# Patient Record
Sex: MALE | Race: WHITE | NOT HISPANIC OR LATINO | Employment: OTHER | URBAN - NONMETROPOLITAN AREA
[De-identification: names, ages, dates, MRNs, and addresses within clinical notes are randomized per-mention and may not be internally consistent; named-entity substitution may affect disease eponyms.]

---

## 2017-01-05 RX ORDER — TRAZODONE HYDROCHLORIDE 50 MG/1
TABLET ORAL
Qty: 30 TABLET | Refills: 5 | Status: SHIPPED | OUTPATIENT
Start: 2017-01-05 | End: 2017-06-09 | Stop reason: SDUPTHER

## 2017-01-16 ENCOUNTER — TELEPHONE (OUTPATIENT)
Dept: INTERNAL MEDICINE | Facility: CLINIC | Age: 77
End: 2017-01-16

## 2017-01-16 DIAGNOSIS — M79.672 FOOT PAIN, LEFT: Primary | ICD-10-CM

## 2017-01-23 ENCOUNTER — LAB (OUTPATIENT)
Dept: INTERNAL MEDICINE | Facility: CLINIC | Age: 77
End: 2017-01-23

## 2017-01-23 ENCOUNTER — HOSPITAL ENCOUNTER (OUTPATIENT)
Dept: GENERAL RADIOLOGY | Facility: HOSPITAL | Age: 77
Discharge: HOME | End: 2017-01-23
Attending: INTERNAL MEDICINE | Admitting: INTERNAL MEDICINE

## 2017-01-23 DIAGNOSIS — E78.5 HYPERLIPIDEMIA, UNSPECIFIED HYPERLIPIDEMIA TYPE: Chronic | ICD-10-CM

## 2017-01-23 DIAGNOSIS — D64.89 ANEMIA DUE TO OTHER CAUSE, NOT CLASSIFIED: ICD-10-CM

## 2017-01-23 DIAGNOSIS — M79.672 FOOT PAIN, LEFT: ICD-10-CM

## 2017-01-23 DIAGNOSIS — E11.8 TYPE 2 DIABETES MELLITUS WITH COMPLICATION, WITHOUT LONG-TERM CURRENT USE OF INSULIN (HCC): Chronic | ICD-10-CM

## 2017-01-23 DIAGNOSIS — E55.9 VITAMIN D DEFICIENCY: ICD-10-CM

## 2017-01-23 LAB
25(OH)D3 SERPL-MCNC: 37.4 NG/ML
ALBUMIN SERPL-MCNC: 4.2 G/DL (ref 3.2–4.8)
ALBUMIN/GLOB SERPL: 1.7 G/DL (ref 1.5–2.5)
ALP SERPL-CCNC: 54 U/L (ref 25–100)
ALT SERPL W P-5'-P-CCNC: 19 U/L (ref 7–40)
ANION GAP SERPL CALCULATED.3IONS-SCNC: 13 MMOL/L (ref 3–11)
ARTICHOKE IGE QN: 60 MG/DL (ref 0–130)
AST SERPL-CCNC: 23 U/L (ref 0–33)
BASOPHILS # BLD AUTO: 0.03 10*3/MM3 (ref 0–0.2)
BASOPHILS NFR BLD AUTO: 0.4 % (ref 0–1)
BILIRUB SERPL-MCNC: 0.6 MG/DL (ref 0.3–1.2)
BUN BLD-MCNC: 13 MG/DL (ref 9–23)
BUN/CREAT SERPL: 10 (ref 7–25)
CALCIUM SPEC-SCNC: 9.8 MG/DL (ref 8.7–10.4)
CHLORIDE SERPL-SCNC: 100 MMOL/L (ref 99–109)
CHOLEST SERPL-MCNC: 132 MG/DL (ref 0–200)
CK SERPL-CCNC: 210 U/L (ref 26–174)
CO2 SERPL-SCNC: 29 MMOL/L (ref 20–31)
CREAT BLD-MCNC: 1.3 MG/DL (ref 0.6–1.3)
DEPRECATED RDW RBC AUTO: 44.6 FL (ref 37–54)
EOSINOPHIL # BLD AUTO: 0.34 10*3/MM3 (ref 0.1–0.3)
EOSINOPHIL NFR BLD AUTO: 4.9 % (ref 0–3)
ERYTHROCYTE [DISTWIDTH] IN BLOOD BY AUTOMATED COUNT: 13.2 % (ref 11.3–14.5)
GFR SERPL CREATININE-BSD FRML MDRD: 54 ML/MIN/1.73
GLOBULIN UR ELPH-MCNC: 2.5 GM/DL
GLUCOSE BLD-MCNC: 84 MG/DL (ref 70–100)
HBA1C MFR BLD: 7.4 % (ref 4.8–5.6)
HCT VFR BLD AUTO: 43.5 % (ref 38.9–50.9)
HDLC SERPL-MCNC: 47 MG/DL (ref 40–60)
HGB BLD-MCNC: 14.5 G/DL (ref 13.1–17.5)
IMM GRANULOCYTES # BLD: 0.01 10*3/MM3 (ref 0–0.03)
IMM GRANULOCYTES NFR BLD: 0.1 % (ref 0–0.6)
LYMPHOCYTES # BLD AUTO: 2.29 10*3/MM3 (ref 0.6–4.8)
LYMPHOCYTES NFR BLD AUTO: 33.3 % (ref 24–44)
MCH RBC QN AUTO: 30.7 PG (ref 27–31)
MCHC RBC AUTO-ENTMCNC: 33.3 G/DL (ref 32–36)
MCV RBC AUTO: 92.2 FL (ref 80–99)
MONOCYTES # BLD AUTO: 0.54 10*3/MM3 (ref 0–1)
MONOCYTES NFR BLD AUTO: 7.9 % (ref 0–12)
NEUTROPHILS # BLD AUTO: 3.66 10*3/MM3 (ref 1.5–8.3)
NEUTROPHILS NFR BLD AUTO: 53.4 % (ref 41–71)
PLATELET # BLD AUTO: 133 10*3/MM3 (ref 150–450)
PMV BLD AUTO: 10 FL (ref 6–12)
POTASSIUM BLD-SCNC: 4.7 MMOL/L (ref 3.5–5.5)
PROT SERPL-MCNC: 6.7 G/DL (ref 5.7–8.2)
RBC # BLD AUTO: 4.72 10*6/MM3 (ref 4.2–5.76)
SODIUM BLD-SCNC: 142 MMOL/L (ref 132–146)
TRIGL SERPL-MCNC: 87 MG/DL (ref 0–150)
WBC NRBC COR # BLD: 6.87 10*3/MM3 (ref 3.5–10.8)

## 2017-01-23 PROCEDURE — 85025 COMPLETE CBC W/AUTO DIFF WBC: CPT | Performed by: INTERNAL MEDICINE

## 2017-01-23 PROCEDURE — 83036 HEMOGLOBIN GLYCOSYLATED A1C: CPT | Performed by: INTERNAL MEDICINE

## 2017-01-23 PROCEDURE — 82306 VITAMIN D 25 HYDROXY: CPT | Performed by: INTERNAL MEDICINE

## 2017-01-23 PROCEDURE — 80053 COMPREHEN METABOLIC PANEL: CPT | Performed by: INTERNAL MEDICINE

## 2017-01-23 PROCEDURE — 80061 LIPID PANEL: CPT | Performed by: INTERNAL MEDICINE

## 2017-01-23 PROCEDURE — 82550 ASSAY OF CK (CPK): CPT | Performed by: INTERNAL MEDICINE

## 2017-01-23 PROCEDURE — 73630 X-RAY EXAM OF FOOT: CPT

## 2017-01-25 ENCOUNTER — OFFICE VISIT (OUTPATIENT)
Dept: INTERNAL MEDICINE | Facility: CLINIC | Age: 77
End: 2017-01-25

## 2017-01-25 VITALS
RESPIRATION RATE: 14 BRPM | SYSTOLIC BLOOD PRESSURE: 120 MMHG | OXYGEN SATURATION: 96 % | WEIGHT: 208 LBS | HEIGHT: 73 IN | HEART RATE: 66 BPM | TEMPERATURE: 97.7 F | BODY MASS INDEX: 27.57 KG/M2 | DIASTOLIC BLOOD PRESSURE: 66 MMHG

## 2017-01-25 DIAGNOSIS — I10 ESSENTIAL HYPERTENSION: Chronic | ICD-10-CM

## 2017-01-25 DIAGNOSIS — E78.5 HYPERLIPIDEMIA, UNSPECIFIED HYPERLIPIDEMIA TYPE: Chronic | ICD-10-CM

## 2017-01-25 DIAGNOSIS — D64.89 ANEMIA DUE TO OTHER CAUSE, NOT CLASSIFIED: ICD-10-CM

## 2017-01-25 DIAGNOSIS — M54.16 LUMBAR RADICULOPATHY: ICD-10-CM

## 2017-01-25 DIAGNOSIS — K21.9 GASTROESOPHAGEAL REFLUX DISEASE WITHOUT ESOPHAGITIS: Chronic | ICD-10-CM

## 2017-01-25 DIAGNOSIS — M77.32 HEEL SPUR, LEFT: Primary | ICD-10-CM

## 2017-01-25 DIAGNOSIS — C61 MALIGNANT NEOPLASM OF PROSTATE (HCC): ICD-10-CM

## 2017-01-25 DIAGNOSIS — I25.119 CORONARY ARTERY DISEASE INVOLVING NATIVE CORONARY ARTERY OF NATIVE HEART WITH ANGINA PECTORIS (HCC): Chronic | ICD-10-CM

## 2017-01-25 DIAGNOSIS — E11.8 TYPE 2 DIABETES MELLITUS WITH COMPLICATION, WITHOUT LONG-TERM CURRENT USE OF INSULIN (HCC): Chronic | ICD-10-CM

## 2017-01-25 DIAGNOSIS — E55.9 VITAMIN D DEFICIENCY: ICD-10-CM

## 2017-01-25 PROCEDURE — 99214 OFFICE O/P EST MOD 30 MIN: CPT | Performed by: INTERNAL MEDICINE

## 2017-01-25 NOTE — MR AVS SNAPSHOT
Tristan Haines   1/25/2017 1:45 PM   Office Visit    Provider:  Cayden Chiang MD   Department:  [facility] PRIMARY CARE   Dept Phone:  [phone]                Your Full Care Plan              Your Updated Medication List          This list is accurate as of: 1/25/17  3:34 PM.  Always use your most recent med list.                aspirin 81 MG tablet       atorvastatin 40 MG tablet   Commonly known as:  LIPITOR   TAKE 1 TABLET BY MOUTH ONE TIME A DAY       BD PEN NEEDLE EDDI U/F 32G X 4 MM misc   Generic drug:  Insulin Pen Needle   USE TO INJECT INSULIN TWICE DAILY AS DIRECTED       carvedilol 25 MG tablet   Commonly known as:  COREG   TAKE 1 TABLET BY MOUTH TWO TIMES A DAY as directed by physician       gabapentin 400 MG capsule   Commonly known as:  NEURONTIN   TAKE 1 CAPSULE BY MOUTH THREE TIMES A DAY       insulin aspart 100 UNIT/ML solution pen-injector sc pen   Commonly known as:  NOVOLOG FLEXPEN   Inject 5 Units under the skin 2 (two) times a day before meals.       lansoprazole 30 MG capsule   Commonly known as:  PREVACID   Take 1 capsule by mouth daily.       losartan 100 MG tablet   Commonly known as:  COZAAR   1 tab daily po       * ONETOUCH ULTRA BLUE VI       * ONE TOUCH ULTRA TEST test strip   Generic drug:  glucose blood   TEST BLOOS SUGAR 3 TIMES DAILY       PROBIOTIC DAILY PO       TOUJEO SOLOSTAR 300 UNIT/ML solution pen-injector   Generic drug:  Insulin Glargine   inject 25 UNITS sub-q TWO TIMES A DAY       traZODone 50 MG tablet   Commonly known as:  DESYREL   TAKE 1 TABLET BY MOUTH every night       Vitamin D3 2000 UNITS tablet       * Notice:  This list has 2 medication(s) that are the same as other medications prescribed for you. Read the directions carefully, and ask your doctor or other care provider to review them with you.            We Performed the Following     Ambulatory Referral to Podiatry       You Were Diagnosed With        Codes Comments       Heel spur, left    -  Primary ICD-10-CM: M77.32  ICD-9-CM: 726.73     Hyperlipidemia, unspecified hyperlipidemia type     ICD-10-CM: E78.5  ICD-9-CM: 272.4     Essential hypertension     ICD-10-CM: I10  ICD-9-CM: 401.9     Coronary artery disease involving native coronary artery of native heart with angina pectoris     ICD-10-CM: I25.119  ICD-9-CM: 414.01, 413.9     Gastroesophageal reflux disease without esophagitis     ICD-10-CM: K21.9  ICD-9-CM: 530.81     Type 2 diabetes mellitus with complication, without long-term current use of insulin     ICD-10-CM: E11.8  ICD-9-CM: 250.90     Vitamin D deficiency     ICD-10-CM: E55.9  ICD-9-CM: 268.9     Lumbar radiculopathy     ICD-10-CM: M54.16  ICD-9-CM: 724.4     Anemia due to other cause, not classified     ICD-10-CM: D64.89  ICD-9-CM: 285.8     Malignant neoplasm of prostate     ICD-10-CM: C61  ICD-9-CM: 185       Instructions     None    Patient Instructions History      [employer] Signup     [employer] allows you to send messages to your doctor, view your test results, renew your prescriptions, schedule appointments, and more. To sign up, go to [employer] and click on the Sign Up Now link in the New User? box. Enter your [employer] Activation Code exactly as it appears below along with the last four digits of your Social Security Number and your Date of Birth () to complete the sign-up process. If you do not sign up before the expiration date, you must request a new code.    [employer] Activation Code: [email or web address]  Expires: 2017 10:14 AM    If you have questions, you can email [email or web address] or call [phone] to talk to our [employer] staff. Remember, V[employer]able is NOT to be used for urgent needs. For medical emergencies, dial 911.               Other Info from Your Visit           Your Appointments     Mar 07, 2017  9:30 AM EST   Outside Facility with Darrian Kilpatrick MD   [facility] GASTROENTEROLOGY  "(--)    [address]  [facility] [address] [phone]              Allergies     No Known Allergies      Reason for Visit     Diabetes Follow up - discuss labs and x-ray, MRI      Vital Signs     Blood Pressure Pulse Temperature Respirations Height Weight    120/66 66 97.7 °F (36.5 °C) 14 73\" (185.4 cm) 208 lb (94.3 kg)    Oxygen Saturation Body Mass Index Smoking Status             96% 27.44 kg/m2 Never Smoker         Problems and Diagnoses Noted     Anemia    Coronary heart disease    Diabetes    Acid reflux disease    High cholesterol or triglycerides    High blood pressure    Lumbar nerve root disorder    Prostate cancer    Vitamin D deficiency    Heel spur    -  Primary      "

## 2017-01-25 NOTE — PROGRESS NOTES
Subjective   Tristan Haines is a 77 y.o. male.     Chief Complaint   Patient presents with   • Diabetes     Follow up - discuss labs and x-ray, MRI       History of Present Illness   Patient here for follow-up. A1c 7.4. Creatinine kinase 196 vitamin D 37, . Coronary artery disease is stable medication. Hyperlipidemia stable medication. Neuropathy stable medication., self discontinued novolog, HTN stable on med, [place] ankle swelling,     Current Outpatient Prescriptions:   •  aspirin 81 MG tablet, Take  by mouth., Disp: , Rfl:   •  atorvastatin (LIPITOR) 40 MG tablet, TAKE 1 TABLET BY MOUTH ONE TIME A DAY , Disp: 30 tablet, Rfl: 1  •  BD PEN NEEDLE EDDI U/F 32G X 4 MM misc, USE TO INJECT INSULIN TWICE DAILY AS DIRECTED , Disp: 100 each, Rfl: 2  •  carvedilol (COREG) 25 MG tablet, TAKE 1 TABLET BY MOUTH TWO TIMES A DAY as directed by physician , Disp: 180 tablet, Rfl: 1  •  Cholecalciferol (VITAMIN D3) 2000 UNITS tablet, Take  by mouth., Disp: , Rfl:   •  gabapentin (NEURONTIN) 400 MG capsule, TAKE 1 CAPSULE BY MOUTH THREE TIMES A DAY , Disp: 270 capsule, Rfl: 2  •  Glucose Blood (ONETOUCH ULTRA BLUE VI), OneTouch Ultra Blue In Vitro Strip; Patient Sig: OneTouch Ultra Blue In Vitro Strip TEST BLOOS SUGAR 3 TIMES DAILY; 100; 4; 09-Jan-2015; Active, Disp: , Rfl:   •  insulin aspart (NOVOLOG FLEXPEN) 100 UNIT/ML solution pen-injector sc pen, Inject 5 Units under the skin 2 (two) times a day before meals., Disp: 3 pen, Rfl: 3  •  lansoprazole (PREVACID) 30 MG capsule, Take 1 capsule by mouth daily., Disp: 90 capsule, Rfl: 3  •  losartan (COZAAR) 100 MG tablet, 1 tab daily po, Disp: 30 tablet, Rfl: 1  •  ONE TOUCH ULTRA TEST test strip, TEST BLOOS SUGAR 3 TIMES DAILY, Disp: 100 each, Rfl: 4  •  Probiotic Product (PROBIOTIC DAILY PO), Take  by mouth., Disp: , Rfl:   •  TOUJEO SOLOSTAR 300 UNIT/ML solution pen-injector, inject 25 UNITS sub-q TWO TIMES A DAY , Disp: 4.5 mL, Rfl: 4  •  traZODone (DESYREL) 50 MG tablet, TAKE 1  TABLET BY MOUTH every night , Disp: 30 tablet, Rfl: 5    The following portions of the patient's history were reviewed and updated as appropriate: allergies, current medications, past family history, past medical history, past social history, past surgical history and problem list.    Review of Systems   Constitutional: Negative.    Respiratory: Negative.    Cardiovascular: Negative.    Gastrointestinal: Negative.    Musculoskeletal: Negative.    Skin: Negative.    Neurological: Negative.    Psychiatric/Behavioral: Negative.        Objective   Physical Exam   Constitutional: He is oriented to person, place, and time. He appears well-nourished.   Neck: Neck supple.   Cardiovascular: Normal rate, regular rhythm and normal heart sounds.    Pulmonary/Chest: Effort normal and breath sounds normal.   Abdominal: Bowel sounds are normal.   Neurological: He is alert and oriented to person, place, and time.   Skin: Skin is warm.   Psychiatric: He has a normal mood and affect.       All tests have been reviewed.    Assessment/Plan   There are no diagnoses linked to this encounter.          lumbar disc surgery, 2004 L4-5.  LBP lumbar radiculopathy. continue Flexeril Mobic, MRI L2/3 mod to severe stenosis. continue follow-up with chiropractor, pain improved   neuropathy,neurotin 400mg bid continue unable to tolerate lyrica  sacroiliac inflammation continue chiro. tylenol arthritis.  edema 1+ pressure hose.hold amlodipine   Coronary artery disease a status post a CABG pleural effusion follow up chest x-ray continue cardio follow up  Anemia, resolved. Bright red blood per rectum Hemoccult positive m. w/u lab done low ferritin sbft if still anemia. Patient later declined to do colonoscopy and upper endoscopy  eczema refill medicine  Diabetes continue toujeo 50u qhs, resume mealtime novolog 5 units tid.   colon 2012? obtain record. [facility] dr dannie garcia--  diarrhea, metformin caused, resolved after d/c metformin  GERD  continue medicine pending EGD  Hypertension continue medicine,  losartan 100mg  Hyperlipidemia cotninue atorvastatin 40 mg   Peripheral neuropathy continue medications  tinnitus, loss of hearing  vision change see eye doctor  leg cramps skin lesions,   hydrocele chronic patient decline to see uro   sleep disturbances, continue trazodone  Td to 2010?, prevnar 13 done, zostavax done, refuse flushot. pneumovax done--  prostate ca s/p removal PSA=0.00  Follow-up in 4 month after labs

## 2017-02-16 ENCOUNTER — OFFICE VISIT (OUTPATIENT)
Dept: ORTHOPEDIC SURGERY | Facility: CLINIC | Age: 77
End: 2017-02-16

## 2017-02-16 VITALS — HEIGHT: 73 IN | WEIGHT: 213 LBS | BODY MASS INDEX: 28.23 KG/M2 | RESPIRATION RATE: 18 BRPM

## 2017-02-16 DIAGNOSIS — M79.672 LEFT FOOT PAIN: Primary | ICD-10-CM

## 2017-02-16 DIAGNOSIS — E11.8 TYPE 2 DIABETES MELLITUS WITH COMPLICATION, WITHOUT LONG-TERM CURRENT USE OF INSULIN (HCC): ICD-10-CM

## 2017-02-16 DIAGNOSIS — M72.2 PLANTAR FASCIITIS: ICD-10-CM

## 2017-02-16 PROCEDURE — 99204 OFFICE O/P NEW MOD 45 MIN: CPT | Performed by: PODIATRIST

## 2017-02-16 RX ORDER — ATORVASTATIN CALCIUM 40 MG/1
TABLET, FILM COATED ORAL
Qty: 30 TABLET | Refills: 0 | Status: SHIPPED | OUTPATIENT
Start: 2017-02-16 | End: 2017-03-13 | Stop reason: SDUPTHER

## 2017-02-16 RX ORDER — LOSARTAN POTASSIUM 100 MG/1
TABLET ORAL
Qty: 30 TABLET | Refills: 0 | Status: SHIPPED | OUTPATIENT
Start: 2017-02-16 | End: 2017-03-13 | Stop reason: SDUPTHER

## 2017-02-16 NOTE — PROGRESS NOTES
Subjective   Patient ID: Tristan Haines is a 77 y.o. male   Pain and Consult of the Left Foot (Patient is being seen today in regards to Dr. Chiang.)  he comes in with multiple areas of pain and complaints.  He says he thinks he has plantar fasciitis and this knot on the outside of his foot bothers him.  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    History of Present Illness    Review of Systems   Constitutional: Negative for diaphoresis, fever and unexpected weight change.   HENT: Negative for dental problem and sore throat.    Eyes: Negative for visual disturbance.   Respiratory: Negative for shortness of breath.    Cardiovascular: Negative for chest pain.   Gastrointestinal: Negative for abdominal pain, constipation, diarrhea, nausea and vomiting.   Genitourinary: Negative for difficulty urinating and frequency.   Musculoskeletal: Positive for arthralgias.   Neurological: Negative for headaches.   Hematological: Does not bruise/bleed easily.       Past Medical History   Diagnosis Date   • Abdominal pain      Hx of.   • Atypical chest pain      Hx of.   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 He appears well-developed and well-nourished.   HENT:   Head: Normocephalic.   Eyes: Pupils are equal, round, and reactive to light.   Neurological: He is alert and oriented  to person, place, and time.   Skin: Skin is warm.   Psychiatric: He has a normal mood and affect. His behavior is normal.   Vitals reviewed.    Right Ankle Exam     Range of Motion   The patient has normal right ankle ROM.    Muscle Strength   The patient has normal right ankle strength.      Left Ankle Exam     Range of Motion   The patient has normal left ankle ROM.     Muscle Strength   The patient has normal left ankle strength.    Other   Sensation: normal  Pulse: present    Comments:  Patient is ttp at the plantar medial aspect of the calcaneal tuberosity.          Left Ankle Exam     Range of Motion   Normal left ankle ROM    Muscle Strength   Normal left ankle strength    Comments:  Patient is ttp at the plantar medial aspect of the calcaneal tuberosity.    Right Ankle Exam     Range of Motion   Normal right ankle ROM    Muscle Strength   Normal right ankle strength        Left Lower extremity exam:  Vascular: Pedal pulses are palpable, pedal hair is noted, capillary refill time is brisk  Neuro: Gross sensations intact but there is lack of protective sensation  Derm: His skin is supple and warm.  No dry skin scaling her wounds  MSK: He maintains his medial longitudinal arch upon weightbearing.  There is slight dorsal digital contracture digits 2 through 5.  He has some tenderness to palpation along the prominent fifth metatarsal base.  There is no pain to the medial tubercle of the calcaneus but some slight pain along the lateral band of the plantar fascia.    Assessment/Plan  diabetes, cavovarus foot, plantar fasciitis  Independent Review of Radiographic Studies:      Laboratory and Other Studies:     Medical Decision Making:        Procedures  [] No procedures were performed in office today.    Tristan was seen today for pain and consult.    Diagnoses and all orders for this visit:    Left foot pain  -     XR Foot 3+ View Left  -     Ambulatory Referral to Podiatry    Type 2 diabetes mellitus with  complication, without long-term current use of insulin  -     Ambulatory Referral to Podiatry    Plantar fasciitis  -     Ambulatory Referral to Podiatry    Other orders  -     diclofenac (VOLTAREN) 1 % gel gel; Apply 4 g topically 4 (Four) Times a Day As Needed (Pain).          Recommendations/Plan:  I had a long discussion with he and his wife about all the potential little subtle issues that are most likely causing him discomfort and pain.  I advised him that the neuropathy complaint part and causing numbness and tingling and burning pains.  I explained the pain in his heel is most likely his plantar fashion rather than the bone spur.  I explained that the prominence on the lateral part of his foot is his fifth metatarsal base and some time this can become irritated due to rubbing or pressure.  I explained he can also get a bursitis there are times.  For now i  think he would probably benefit from some more supportive shoes.  He is a  and stands a lot and says he typically wears flat shoes or dress shoes.  I recommend a more supportive tennis shoe.  I want to send him to Dr. Carrera to get diabetic shoes and inserts made.  I'll also try topical Voltaren.  He can't take oral NSAIDs due to GI issues.  I explained that if the Voltaren doesn't well we could maybe try a compounded topical cream.  Explained Dr. Chiang we'll have to fill out his M medical necessity for his shoes but then he should be to get those.  I'll see him back in several weeks after he gets them or he can come back sooner if needed.    Return in about 6 weeks (around 3/30/2017).  Patient agreeable to call or return sooner for any concerns.

## 2017-02-22 NOTE — TELEPHONE ENCOUNTER
----- Message from Cayden Chiang MD sent at 2/20/2017 11:02 AM EST -----  Contact: PATIENT  Please check how he could get the shoes, i have not talked with dr pimentel  ----- Message -----     From: Luz Eddy MA     Sent: 2/20/2017  10:03 AM       To: Cayden Chiang MD    ?  ----- Message -----     From: Pearl Benjamin     Sent: 2/17/2017  12:10 PM       To: Luz Eddy MA    Patient called today stating that he saw Dr. Pimentel and he told him that Dr. Chiang would need to write the prescriptions for his diabetic shoes for 1 year, and insoles 3 times a year. Patient was unsure if Dr. Pimentel would be sending a request or not, and he told him to speak with Dr. Chiang about this. Was checking to see if a request had been made. Contact patient with update, when possible. Thank you.

## 2017-03-07 ENCOUNTER — LAB REQUISITION (OUTPATIENT)
Dept: LAB | Facility: HOSPITAL | Age: 77
End: 2017-03-07

## 2017-03-07 ENCOUNTER — OUTSIDE FACILITY SERVICE (OUTPATIENT)
Dept: GASTROENTEROLOGY | Facility: CLINIC | Age: 77
End: 2017-03-07

## 2017-03-07 DIAGNOSIS — K21.00 GASTRO-ESOPHAGEAL REFLUX DISEASE WITH ESOPHAGITIS: ICD-10-CM

## 2017-03-07 PROCEDURE — 88305 TISSUE EXAM BY PATHOLOGIST: CPT | Performed by: INTERNAL MEDICINE

## 2017-03-07 PROCEDURE — 43239 EGD BIOPSY SINGLE/MULTIPLE: CPT | Performed by: INTERNAL MEDICINE

## 2017-03-08 LAB
CYTO UR: NORMAL
LAB AP CASE REPORT: NORMAL
LAB AP CLINICAL INFORMATION: NORMAL
Lab: NORMAL
PATH REPORT.FINAL DX SPEC: NORMAL
PATH REPORT.GROSS SPEC: NORMAL

## 2017-03-13 ENCOUNTER — TELEPHONE (OUTPATIENT)
Dept: GASTROENTEROLOGY | Facility: CLINIC | Age: 77
End: 2017-03-13

## 2017-03-13 RX ORDER — ATORVASTATIN CALCIUM 40 MG/1
TABLET, FILM COATED ORAL
Qty: 30 TABLET | Refills: 0 | Status: SHIPPED | OUTPATIENT
Start: 2017-03-13 | End: 2017-04-07 | Stop reason: SDUPTHER

## 2017-03-13 RX ORDER — LOSARTAN POTASSIUM 100 MG/1
TABLET ORAL
Qty: 30 TABLET | Refills: 0 | Status: SHIPPED | OUTPATIENT
Start: 2017-03-13 | End: 2017-03-20 | Stop reason: SDUPTHER

## 2017-03-13 NOTE — TELEPHONE ENCOUNTER
----- Message from Darrian Kilpatrick MD sent at 3/13/2017  3:02 PM EDT -----  Let Mr. Haines know there were some reflux changes. He should continue current medication.  Thanks,  GMW

## 2017-03-14 ENCOUNTER — TELEPHONE (OUTPATIENT)
Dept: GASTROENTEROLOGY | Facility: CLINIC | Age: 77
End: 2017-03-14

## 2017-03-17 DIAGNOSIS — Z12.11 COLON CANCER SCREENING: Primary | ICD-10-CM

## 2017-03-20 RX ORDER — INSULIN GLARGINE 300 U/ML
INJECTION, SOLUTION SUBCUTANEOUS
Qty: 4.5 ML | Refills: 3 | Status: SHIPPED | OUTPATIENT
Start: 2017-03-20 | End: 2017-07-28 | Stop reason: SDUPTHER

## 2017-03-20 RX ORDER — LOSARTAN POTASSIUM 100 MG/1
TABLET ORAL
Qty: 30 TABLET | Refills: 5 | Status: SHIPPED | OUTPATIENT
Start: 2017-03-20 | End: 2017-05-01 | Stop reason: SDUPTHER

## 2017-03-24 RX ORDER — CARVEDILOL 25 MG/1
TABLET ORAL
Qty: 180 TABLET | Refills: 0 | Status: SHIPPED | OUTPATIENT
Start: 2017-03-24 | End: 2017-06-17 | Stop reason: SDUPTHER

## 2017-03-30 ENCOUNTER — HOSPITAL ENCOUNTER (OUTPATIENT)
Dept: CT IMAGING | Facility: HOSPITAL | Age: 77
Discharge: HOME | End: 2017-03-30
Attending: INTERNAL MEDICINE | Admitting: INTERNAL MEDICINE

## 2017-03-30 ENCOUNTER — OFFICE VISIT (OUTPATIENT)
Dept: INTERNAL MEDICINE | Facility: CLINIC | Age: 77
End: 2017-03-30

## 2017-03-30 VITALS
RESPIRATION RATE: 14 BRPM | WEIGHT: 204 LBS | SYSTOLIC BLOOD PRESSURE: 132 MMHG | TEMPERATURE: 98.7 F | DIASTOLIC BLOOD PRESSURE: 80 MMHG | OXYGEN SATURATION: 96 % | HEIGHT: 73 IN | BODY MASS INDEX: 27.04 KG/M2 | HEART RATE: 72 BPM

## 2017-03-30 DIAGNOSIS — K21.9 GASTROESOPHAGEAL REFLUX DISEASE WITHOUT ESOPHAGITIS: Chronic | ICD-10-CM

## 2017-03-30 DIAGNOSIS — R10.9 ABDOMINAL PAIN, UNSPECIFIED LOCATION: ICD-10-CM

## 2017-03-30 DIAGNOSIS — E78.5 HYPERLIPIDEMIA, UNSPECIFIED HYPERLIPIDEMIA TYPE: Primary | Chronic | ICD-10-CM

## 2017-03-30 DIAGNOSIS — D64.89 ANEMIA DUE TO OTHER CAUSE, NOT CLASSIFIED: ICD-10-CM

## 2017-03-30 DIAGNOSIS — I25.119 CORONARY ARTERY DISEASE INVOLVING NATIVE CORONARY ARTERY OF NATIVE HEART WITH ANGINA PECTORIS (HCC): Chronic | ICD-10-CM

## 2017-03-30 DIAGNOSIS — E11.8 TYPE 2 DIABETES MELLITUS WITH COMPLICATION, WITHOUT LONG-TERM CURRENT USE OF INSULIN (HCC): Chronic | ICD-10-CM

## 2017-03-30 DIAGNOSIS — I10 ESSENTIAL HYPERTENSION: Chronic | ICD-10-CM

## 2017-03-30 PROCEDURE — 99214 OFFICE O/P EST MOD 30 MIN: CPT | Performed by: INTERNAL MEDICINE

## 2017-03-30 PROCEDURE — 74176 CT ABD & PELVIS W/O CONTRAST: CPT

## 2017-03-30 RX ORDER — LOSARTAN POTASSIUM 50 MG/1
TABLET ORAL
Refills: 2 | COMMUNITY
Start: 2017-02-24 | End: 2017-05-01 | Stop reason: SDUPTHER

## 2017-03-30 NOTE — PROGRESS NOTES
Subjective   Tristan Haines is a 77 y.o. male.     Chief Complaint   Patient presents with   • Abdominal Pain     ongoing  -  discuss labs from 1-23-17   • Fatigue     dont feel like doing anything       Abdominal Pain   This is a new problem. The current episode started 1 to 4 weeks ago. The onset quality is undetermined. The problem occurs intermittently. The problem has been unchanged. The pain is located in the LUQ and epigastric region. The pain is moderate. The quality of the pain is dull and sharp. The abdominal pain does not radiate. Associated symptoms include anorexia and constipation. Pertinent negatives include no diarrhea, dysuria, frequency, hematochezia, hematuria, nausea or vomiting. Associated symptoms comments: Stool caliber narrow. Nothing aggravates the pain. The pain is relieved by nothing. He has tried proton pump inhibitors (mom and pepto bismal helped some) for the symptoms. The treatment provided no relief.    Patient here also for follow-up.  The diabetes is stable medication.  Morning sugar between 100-110.  Denies any diarrhea.  GERD stable on medication patient does have mild epigastric area pain.  Hypertension stable medication.  Hyperlipidemia stable medication.  Sleep disturbance is stable medication.      Current Outpatient Prescriptions:   •  aspirin 81 MG tablet, Take  by mouth., Disp: , Rfl:   •  atorvastatin (LIPITOR) 40 MG tablet, TAKE 1 TABLET BY MOUTH ONE TIME A DAY , Disp: 30 tablet, Rfl: 0  •  BD PEN NEEDLE EDDI U/F 32G X 4 MM misc, USE TO INJECT INSULIN TWICE DAILY AS DIRECTED , Disp: 100 each, Rfl: 2  •  carvedilol (COREG) 25 MG tablet, TAKE 1 TABLET BY MOUTH TWO TIMES A DAY AS DIRECTED, Disp: 180 tablet, Rfl: 0  •  Cholecalciferol (VITAMIN D3) 2000 UNITS tablet, Take  by mouth., Disp: , Rfl:   •  diclofenac (VOLTAREN) 1 % gel gel, Apply 4 g topically 4 (Four) Times a Day As Needed (Pain)., Disp: 1 tube, Rfl: 3  •  gabapentin (NEURONTIN) 400 MG capsule, TAKE 1 CAPSULE BY  MOUTH THREE TIMES A DAY , Disp: 270 capsule, Rfl: 2  •  Glucose Blood (ONETOUCH ULTRA BLUE VI), OneTouch Ultra Blue In Vitro Strip; Patient Sig: OneTouch Ultra Blue In Vitro Strip TEST BLOOS SUGAR 3 TIMES DAILY; 100; 4; 09-Jan-2015; Active, Disp: , Rfl:   •  insulin aspart (NOVOLOG FLEXPEN) 100 UNIT/ML solution pen-injector sc pen, Inject 5 Units under the skin 2 (two) times a day before meals., Disp: 3 pen, Rfl: 3  •  lansoprazole (PREVACID) 30 MG capsule, Take 1 capsule by mouth daily., Disp: 90 capsule, Rfl: 3  •  ONE TOUCH ULTRA TEST test strip, TEST BLOOS SUGAR 3 TIMES DAILY, Disp: 100 each, Rfl: 4  •  Probiotic Product (PROBIOTIC DAILY PO), Take  by mouth., Disp: , Rfl:   •  TOUJEO SOLOSTAR 300 UNIT/ML solution pen-injector, inject 25 units subcutaneously two times a day, Disp: 4.5 mL, Rfl: 3  •  traZODone (DESYREL) 50 MG tablet, TAKE 1 TABLET BY MOUTH every night , Disp: 30 tablet, Rfl: 5  •  losartan (COZAAR) 100 MG tablet, TAKE 1 TABLET BY MOUTH ONE TIME A DAY , Disp: 30 tablet, Rfl: 5  •  losartan (COZAAR) 50 MG tablet, TAKE 1 TABLET BY MOUTH ONE TIME A DAY, Disp: , Rfl: 2    The following portions of the patient's history were reviewed and updated as appropriate: allergies, current medications, past family history, past medical history, past social history, past surgical history and problem list.    Review of Systems   Constitutional: Negative.    Respiratory: Negative.    Cardiovascular: Negative.    Gastrointestinal: Positive for abdominal pain, anorexia and constipation. Negative for diarrhea, hematochezia, nausea and vomiting.   Genitourinary: Negative for dysuria, frequency and hematuria.   Musculoskeletal: Negative.    Skin: Negative.    Neurological: Negative.    Psychiatric/Behavioral: Negative.        Objective   Physical Exam   Constitutional: He is oriented to person, place, and time. He appears well-developed and well-nourished.   HENT:   Head: Normocephalic.   Eyes: Conjunctivae are normal.  Pupils are equal, round, and reactive to light.   Neck: Neck supple.   Cardiovascular: Normal rate, regular rhythm and normal heart sounds.    Pulmonary/Chest: Effort normal and breath sounds normal.   Abdominal: Bowel sounds are normal. He exhibits no mass. Tenderness: mild abd tender Lt abd  There is no rebound and no guarding. No hernia.   Neurological: He is alert and oriented to person, place, and time.   Skin: Skin is warm.   Psychiatric: He has a normal mood and affect.       All tests have been reviewed.    Assessment/Plan   There are no diagnoses linked to this encounter.          lumbar disc surgery, 2004 L4-5.  LBP lumbar radiculopathy. continue Flexeril Mobic, MRI L2/3 mod to severe stenosis. continue follow-up with chiropractor, pain improved   neuropathy,neurotin 400mg bid continue unable to tolerate lyrica  sacroiliac inflammation continue chiro. tylenol arthritis.  edema resolved after d/c amlodipine ---  Coronary artery disease a status post a CABG pleural effusion follow up chest x-ray continue cardio follow up  Anemia, resolved. Bright red blood per rectum Hemoccult positive m. w/u lab done low ferritin sbft if still anemia.  Pending colonoscopy and s/p upper endoscopy:esophagitis  eczema refill medicine  Diabetes continue toujeo 50u qhs, novolog 5 units tid. Do lab ---  colon 2012? obtain record. [facility] dr dannie garcia, repeat colon--  diarrhea, metformin caused, resolved after d/c metformin  GERD hx of  surgery continue medicine pending s/p EGD---  Hypertension continue medicine, losartan 100mg  Hyperlipidemia cotninue atorvastatin 40 mg   Peripheral neuropathy continue medications  tinnitus, loss of hearing  vision change see eye doctor  leg cramps skin lesions,   hydrocele chronic patient decline to see uro   sleep disturbances, continue trazodone  Td to 2010?, prevnar 13 done, zostavax done, refuse flushot. pneumovax done--  prostate ca s/p removal PSA=0.00  abd pain Left do CT ,  ?constipation start miralax  Follow-up in 1 month after labs and CT    CT:gall stone, hydrocele and divertic

## 2017-03-31 ENCOUNTER — OFFICE VISIT (OUTPATIENT)
Dept: SURGERY | Facility: CLINIC | Age: 77
End: 2017-03-31

## 2017-03-31 VITALS
BODY MASS INDEX: 27.04 KG/M2 | HEART RATE: 64 BPM | TEMPERATURE: 98 F | WEIGHT: 204 LBS | HEIGHT: 73 IN | RESPIRATION RATE: 16 BRPM | SYSTOLIC BLOOD PRESSURE: 126 MMHG | DIASTOLIC BLOOD PRESSURE: 84 MMHG

## 2017-03-31 DIAGNOSIS — R10.32 LEFT LOWER QUADRANT PAIN: Primary | ICD-10-CM

## 2017-03-31 DIAGNOSIS — K21.00 GASTROESOPHAGEAL REFLUX DISEASE WITH ESOPHAGITIS: ICD-10-CM

## 2017-03-31 DIAGNOSIS — Z12.11 SCREENING FOR COLON CANCER: ICD-10-CM

## 2017-03-31 LAB
ALBUMIN SERPL-MCNC: 4.5 G/DL (ref 3.5–5)
ALBUMIN/GLOB SERPL: 1.6 G/DL (ref 1–2)
ALP SERPL-CCNC: 59 U/L (ref 38–126)
ALT SERPL-CCNC: 30 U/L (ref 13–69)
AST SERPL-CCNC: 27 U/L (ref 15–46)
BASOPHILS # BLD AUTO: 0.03 10*3/MM3 (ref 0–0.2)
BASOPHILS NFR BLD AUTO: 0.4 % (ref 0–2.5)
BILIRUB SERPL-MCNC: 1.1 MG/DL (ref 0.2–1.3)
BUN SERPL-MCNC: 21 MG/DL (ref 7–20)
BUN/CREAT SERPL: 15 (ref 6.3–21.9)
CALCIUM SERPL-MCNC: 9.7 MG/DL (ref 8.4–10.2)
CHLORIDE SERPL-SCNC: 107 MMOL/L (ref 98–107)
CO2 SERPL-SCNC: 25 MMOL/L (ref 26–30)
CREAT SERPL-MCNC: 1.4 MG/DL (ref 0.6–1.3)
EOSINOPHIL # BLD AUTO: 0.14 10*3/MM3 (ref 0–0.7)
EOSINOPHIL NFR BLD AUTO: 1.9 % (ref 0–7)
ERYTHROCYTE [DISTWIDTH] IN BLOOD BY AUTOMATED COUNT: 13.2 % (ref 11.5–14.5)
GLOBULIN SER CALC-MCNC: 2.9 GM/DL
GLUCOSE SERPL-MCNC: 131 MG/DL (ref 74–98)
HBA1C MFR BLD: 6.8 %
HCT VFR BLD AUTO: 46.1 % (ref 42–52)
HGB BLD-MCNC: 15.2 G/DL (ref 14–18)
IMM GRANULOCYTES # BLD: 0.01 10*3/MM3 (ref 0–0.06)
IMM GRANULOCYTES NFR BLD: 0.1 % (ref 0–0.6)
LYMPHOCYTES # BLD AUTO: 2.32 10*3/MM3 (ref 0.6–3.4)
LYMPHOCYTES NFR BLD AUTO: 30.9 % (ref 10–50)
MCH RBC QN AUTO: 30.3 PG (ref 27–31)
MCHC RBC AUTO-ENTMCNC: 33 G/DL (ref 30–37)
MCV RBC AUTO: 91.8 FL (ref 80–94)
MONOCYTES # BLD AUTO: 0.55 10*3/MM3 (ref 0–0.9)
MONOCYTES NFR BLD AUTO: 7.3 % (ref 0–12)
NEUTROPHILS # BLD AUTO: 4.47 10*3/MM3 (ref 2–6.9)
NEUTROPHILS NFR BLD AUTO: 59.4 % (ref 37–80)
NRBC BLD AUTO-RTO: 0 /100 WBC (ref 0–0)
PLATELET # BLD AUTO: 151 10*3/MM3 (ref 130–400)
POTASSIUM SERPL-SCNC: 4.5 MMOL/L (ref 3.5–5.1)
PROT SERPL-MCNC: 7.4 G/DL (ref 6.3–8.2)
RBC # BLD AUTO: 5.02 10*6/MM3 (ref 4.7–6.1)
SODIUM SERPL-SCNC: 143 MMOL/L (ref 137–145)
WBC # BLD AUTO: 7.52 10*3/MM3 (ref 4.8–10.8)

## 2017-03-31 PROCEDURE — 99204 OFFICE O/P NEW MOD 45 MIN: CPT | Performed by: SURGERY

## 2017-03-31 NOTE — PROGRESS NOTES
"Reason for Consultation: Left lower quadrant pain, colon screening    Chief Complaint:   Chief Complaint   Patient presents with   • Abdominal Pain     left lower quadrant area.       History: Pt is here for evaluation of left lower quadrant abdominal pain with pain radiating into right mid abdomen which has been ongoing for @ one month, he also complains of bouts of constipation, he denies dark colored stool and he denies visible rectal bleeding.  His last colonoscopy was performed in the remote past, he had a ct scan of the abdomen and pelvis done on 03/30/2017, please see attached report.  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  Pain in the left lower quadrant and radiates to his left groin, rates it 5 out of 10.  Also has constipation occasionally.    Review of Systems   Constitutional: Negative for chills, fever and unexpected weight change.   HENT: Negative for trouble swallowing and voice change.    Eyes: Negative for visual disturbance.   Respiratory: Negative for apnea, cough, chest tightness, shortness of breath and wheezing.    Cardiovascular: Negative for chest pain, palpitations and leg swelling.   Gastrointestinal: Positive for abdominal pain and constipation. Negative for abdominal distention, anal bleeding, blood in stool, diarrhea, nausea, rectal pain and vomiting.   Endocrine: Negative for cold intolerance and heat intolerance.   Genitourinary: Negative for difficulty urinating, dysuria, flank pain, scrotal swelling and testicular pain.   Musculoskeletal: Negative for back pain, gait problem and joint swelling.   Skin: Negative for color change, rash and wound.   Neurological: Negative for dizziness, syncope, speech difficulty, weakness, numbness and headaches.   Hematological: Negative for adenopathy. Does not bruise/bleed easily.   Psychiatric/Behavioral: Negative for confusion. The patient is not nervous/anxious.        Vital Signs  BP " "126/84  Pulse 64  Temp 98 °F (36.7 °C) (Temporal Artery )   Resp 16  Ht 73\" (185.4 cm)  Wt 204 lb (92.5 kg)  BMI 26.91 kg/m2    Allergies:  No Known Allergies    Medications:    Current Outpatient Prescriptions:   •  aspirin 81 MG tablet, Take  by mouth., Disp: , Rfl:   •  atorvastatin (LIPITOR) 40 MG tablet, TAKE 1 TABLET BY MOUTH ONE TIME A DAY , Disp: 30 tablet, Rfl: 0  •  BD PEN NEEDLE EDDI U/F 32G X 4 MM misc, USE TO INJECT INSULIN TWICE DAILY AS DIRECTED , Disp: 100 each, Rfl: 2  •  carvedilol (COREG) 25 MG tablet, TAKE 1 TABLET BY MOUTH TWO TIMES A DAY AS DIRECTED, Disp: 180 tablet, Rfl: 0  •  Cholecalciferol (VITAMIN D3) 2000 UNITS tablet, Take  by mouth., Disp: , Rfl:   •  diclofenac (VOLTAREN) 1 % gel gel, Apply 4 g topically 4 (Four) Times a Day As Needed (Pain)., Disp: 1 tube, Rfl: 3  •  gabapentin (NEURONTIN) 400 MG capsule, TAKE 1 CAPSULE BY MOUTH THREE TIMES A DAY , Disp: 270 capsule, Rfl: 2  •  Glucose Blood (ONETOUCH ULTRA BLUE VI), OneTouch Ultra Blue In Vitro Strip; Patient Sig: OneTouch Ultra Blue In Vitro Strip TEST BLOOS SUGAR 3 TIMES DAILY; 100; 4; 09-Jan-2015; Active, Disp: , Rfl:   •  insulin aspart (NOVOLOG FLEXPEN) 100 UNIT/ML solution pen-injector sc pen, Inject 5 Units under the skin 2 (two) times a day before meals., Disp: 3 pen, Rfl: 3  •  lansoprazole (PREVACID) 30 MG capsule, Take 1 capsule by mouth daily., Disp: 90 capsule, Rfl: 3  •  ONE TOUCH ULTRA TEST test strip, TEST BLOOS SUGAR 3 TIMES DAILY, Disp: 100 each, Rfl: 4  •  Probiotic Product (PROBIOTIC DAILY PO), Take  by mouth., Disp: , Rfl:   •  TOUJEO SOLOSTAR 300 UNIT/ML solution pen-injector, inject 25 units subcutaneously two times a day, Disp: 4.5 mL, Rfl: 3  •  traZODone (DESYREL) 50 MG tablet, TAKE 1 TABLET BY MOUTH every night , Disp: 30 tablet, Rfl: 5  •  losartan (COZAAR) 100 MG tablet, TAKE 1 TABLET BY MOUTH ONE TIME A DAY , Disp: 30 tablet, Rfl: 5  •  losartan (COZAAR) 50 MG tablet, TAKE 1 TABLET BY MOUTH ONE " TIME A DAY, Disp: , Rfl: 2    Physical Exam:   General Appearance:    Alert, cooperative, in no acute distress   Head:    Normocephalic, without obvious abnormality, atraumatic   Lungs:     Clear to auscultation,respirations regular, even and                  unlabored    Heart:    Regular rhythm and normal rate, normal S1 and S2, no            murmur, no gallop, no rub, no click   Abdomen:     Normal bowel sounds, no masses, no organomegaly, soft        non-tender, non-distended, no guarding, no rebound                tenderness   Extremities:   Moves all extremities well, no edema, no cyanosis, no             redness   Pulses:   Pulses palpable and equal bilaterally   Skin:   No bleeding, bruising or rash      Assessment/Plan     1. Left lower quadrant pain    2. Screening for colon cancer    3. Gastroesophageal reflux disease with esophagitis      Patient scheduled for colonoscopy, risk of bleeding, infection and possible perforation discussed.  Patient agreeable.  Vicky Perez MD  03/31/17

## 2017-04-04 ENCOUNTER — OUTSIDE FACILITY SERVICE (OUTPATIENT)
Dept: SURGERY | Facility: CLINIC | Age: 77
End: 2017-04-04

## 2017-04-04 PROCEDURE — G0500 MOD SEDAT ENDO SERVICE >5YRS: HCPCS | Performed by: SURGERY

## 2017-04-04 PROCEDURE — 45380 COLONOSCOPY AND BIOPSY: CPT | Performed by: SURGERY

## 2017-04-07 RX ORDER — ATORVASTATIN CALCIUM 40 MG/1
TABLET, FILM COATED ORAL
Qty: 30 TABLET | Refills: 0 | Status: SHIPPED | OUTPATIENT
Start: 2017-04-07 | End: 2017-05-06 | Stop reason: SDUPTHER

## 2017-04-12 ENCOUNTER — OFFICE VISIT (OUTPATIENT)
Dept: SURGERY | Facility: CLINIC | Age: 77
End: 2017-04-12

## 2017-04-12 VITALS
DIASTOLIC BLOOD PRESSURE: 84 MMHG | TEMPERATURE: 98.2 F | BODY MASS INDEX: 26.91 KG/M2 | SYSTOLIC BLOOD PRESSURE: 126 MMHG | WEIGHT: 204 LBS

## 2017-04-12 DIAGNOSIS — K50.00 ILEITIS, TERMINAL, WITHOUT COMPLICATIONS (HCC): Primary | ICD-10-CM

## 2017-04-12 PROCEDURE — 99213 OFFICE O/P EST LOW 20 MIN: CPT | Performed by: SURGERY

## 2017-04-12 NOTE — PROGRESS NOTES
Reason for Consultation: Follow-up colonoscopy    Chief Complaint:   Chief Complaint   Patient presents with   • Follow-up     Pt here for colonoscopy follow up. He denies problems at this time.       History of present illness:  I saw the patient in the office today as a followup from their recent colonoscopy with polypectomy, the pathology report did show active focal ileitis, no evidence of Crohn's disease or ulcerative colitis, no evidence of cancer.  They state that they have done well and are having no complaints.  Patient had right colectomy in the past.    Review of Systems   Constitutional: Negative for chills, fatigue and fever.   Respiratory: Negative for cough.    Cardiovascular: Negative for chest pain.   Gastrointestinal: Negative for abdominal pain, diarrhea, nausea and vomiting.       Allergies:  No Known Allergies    Medications:    Current Outpatient Prescriptions:   •  aspirin 81 MG tablet, Take  by mouth., Disp: , Rfl:   •  atorvastatin (LIPITOR) 40 MG tablet, TAKE 1 TABLET BY MOUTH ONE TIME A DAY , Disp: 30 tablet, Rfl: 0  •  BD PEN NEEDLE EDDI U/F 32G X 4 MM misc, USE TO INJECT INSULIN TWICE DAILY AS DIRECTED , Disp: 100 each, Rfl: 2  •  carvedilol (COREG) 25 MG tablet, TAKE 1 TABLET BY MOUTH TWO TIMES A DAY AS DIRECTED, Disp: 180 tablet, Rfl: 0  •  Cholecalciferol (VITAMIN D3) 2000 UNITS tablet, Take  by mouth., Disp: , Rfl:   •  diclofenac (VOLTAREN) 1 % gel gel, Apply 4 g topically 4 (Four) Times a Day As Needed (Pain)., Disp: 1 tube, Rfl: 3  •  gabapentin (NEURONTIN) 400 MG capsule, TAKE 1 CAPSULE BY MOUTH THREE TIMES A DAY , Disp: 270 capsule, Rfl: 2  •  Glucose Blood (ONETOUCH ULTRA BLUE VI), OneTouch Ultra Blue In Vitro Strip; Patient Sig: OneTouch Ultra Blue In Vitro Strip TEST BLOOS SUGAR 3 TIMES DAILY; 100; 4; 09-Jan-2015; Active, Disp: , Rfl:   •  insulin aspart (NOVOLOG FLEXPEN) 100 UNIT/ML solution pen-injector sc pen, Inject 5 Units under the skin 2 (two) times a day before  meals., Disp: 3 pen, Rfl: 3  •  lansoprazole (PREVACID) 30 MG capsule, Take 1 capsule by mouth daily., Disp: 90 capsule, Rfl: 3  •  losartan (COZAAR) 100 MG tablet, TAKE 1 TABLET BY MOUTH ONE TIME A DAY , Disp: 30 tablet, Rfl: 5  •  losartan (COZAAR) 50 MG tablet, TAKE 1 TABLET BY MOUTH ONE TIME A DAY, Disp: , Rfl: 2  •  ONE TOUCH ULTRA TEST test strip, TEST BLOOS SUGAR 3 TIMES DAILY, Disp: 100 each, Rfl: 4  •  Probiotic Product (PROBIOTIC DAILY PO), Take  by mouth., Disp: , Rfl:   •  TOUJEO SOLOSTAR 300 UNIT/ML solution pen-injector, inject 25 units subcutaneously two times a day, Disp: 4.5 mL, Rfl: 3  •  traZODone (DESYREL) 50 MG tablet, TAKE 1 TABLET BY MOUTH every night , Disp: 30 tablet, Rfl: 5    Vital Signs:  Temp:  [98.2 °F (36.8 °C)] 98.2 °F (36.8 °C)  BP: (126)/(84) 126/84    Physical Exam:   General Appearance:    Alert, cooperative, in no acute distress   Abdomen:     no masses, no organomegaly, soft non-tender, non-distended, no guarding, wounds are well healed, no evidence of recurrent hernia   Chest:      Clear toausculation     Assessment / Plan:    1. Ileitis, terminal, without complications     ileitis likely related to nonsteroidal use, no evidence of Crohn's disease.  Will need repeat colonoscopy in 3 years.    I did discuss the situation with the patient today in the office and they have done well from their recent colonoscopy with polypectomy.  I have released the patient back to normal activity.  I need to see the patient back in the office in 3 years and they will need to have repeat colonoscopy at that time.    Vicky Perez MD  04/12/17

## 2017-04-19 RX ORDER — PEN NEEDLE, DIABETIC 32GX 5/32"
NEEDLE, DISPOSABLE MISCELLANEOUS
Qty: 100 EACH | Refills: 11 | Status: SHIPPED | OUTPATIENT
Start: 2017-04-19 | End: 2017-12-05 | Stop reason: SDUPTHER

## 2017-05-01 ENCOUNTER — OFFICE VISIT (OUTPATIENT)
Dept: INTERNAL MEDICINE | Facility: CLINIC | Age: 77
End: 2017-05-01

## 2017-05-01 VITALS
OXYGEN SATURATION: 96 % | BODY MASS INDEX: 27.43 KG/M2 | SYSTOLIC BLOOD PRESSURE: 134 MMHG | TEMPERATURE: 97.8 F | WEIGHT: 207 LBS | HEART RATE: 62 BPM | DIASTOLIC BLOOD PRESSURE: 72 MMHG | HEIGHT: 73 IN

## 2017-05-01 DIAGNOSIS — E11.8 TYPE 2 DIABETES MELLITUS WITH COMPLICATION, WITHOUT LONG-TERM CURRENT USE OF INSULIN (HCC): Chronic | ICD-10-CM

## 2017-05-01 DIAGNOSIS — I25.119 CORONARY ARTERY DISEASE INVOLVING NATIVE CORONARY ARTERY OF NATIVE HEART WITH ANGINA PECTORIS (HCC): Chronic | ICD-10-CM

## 2017-05-01 DIAGNOSIS — E55.9 VITAMIN D DEFICIENCY: ICD-10-CM

## 2017-05-01 DIAGNOSIS — M54.16 LUMBAR RADICULOPATHY: ICD-10-CM

## 2017-05-01 DIAGNOSIS — N43.3 CHRONIC HYDROCELE: Chronic | ICD-10-CM

## 2017-05-01 DIAGNOSIS — K21.9 GASTROESOPHAGEAL REFLUX DISEASE WITHOUT ESOPHAGITIS: Chronic | ICD-10-CM

## 2017-05-01 DIAGNOSIS — E78.5 HYPERLIPIDEMIA, UNSPECIFIED HYPERLIPIDEMIA TYPE: Primary | Chronic | ICD-10-CM

## 2017-05-01 DIAGNOSIS — I10 ESSENTIAL HYPERTENSION: Chronic | ICD-10-CM

## 2017-05-01 PROBLEM — R10.9 ABDOMINAL PAIN: Status: RESOLVED | Noted: 2017-03-30 | Resolved: 2017-05-01

## 2017-05-01 PROCEDURE — 99214 OFFICE O/P EST MOD 30 MIN: CPT | Performed by: INTERNAL MEDICINE

## 2017-05-01 RX ORDER — LOSARTAN POTASSIUM 50 MG/1
50 TABLET ORAL DAILY
Qty: 90 TABLET | Refills: 3 | Status: SHIPPED | OUTPATIENT
Start: 2017-05-01 | End: 2018-02-08 | Stop reason: DRUGHIGH

## 2017-05-01 RX ORDER — LOSARTAN POTASSIUM 100 MG/1
100 TABLET ORAL DAILY
Qty: 90 TABLET | Refills: 3 | Status: SHIPPED | OUTPATIENT
Start: 2017-05-01 | End: 2018-01-05

## 2017-05-08 ENCOUNTER — RESULTS ENCOUNTER (OUTPATIENT)
Dept: INTERNAL MEDICINE | Facility: CLINIC | Age: 77
End: 2017-05-08

## 2017-05-08 DIAGNOSIS — E78.5 HYPERLIPIDEMIA, UNSPECIFIED HYPERLIPIDEMIA TYPE: Chronic | ICD-10-CM

## 2017-05-08 DIAGNOSIS — E11.8 TYPE 2 DIABETES MELLITUS WITH COMPLICATION, WITHOUT LONG-TERM CURRENT USE OF INSULIN (HCC): Chronic | ICD-10-CM

## 2017-05-08 RX ORDER — ATORVASTATIN CALCIUM 40 MG/1
TABLET, FILM COATED ORAL
Qty: 30 TABLET | Refills: 0 | Status: SHIPPED | OUTPATIENT
Start: 2017-05-08 | End: 2018-03-19 | Stop reason: SDUPTHER

## 2017-06-02 ENCOUNTER — HOSPITAL ENCOUNTER (EMERGENCY)
Dept: HOSPITAL 30 - ER | Age: 77
Discharge: HOME | End: 2017-06-02
Payer: MEDICARE

## 2017-06-02 DIAGNOSIS — E10.9: ICD-10-CM

## 2017-06-02 DIAGNOSIS — Z98.890: ICD-10-CM

## 2017-06-02 DIAGNOSIS — Z79.82: ICD-10-CM

## 2017-06-02 DIAGNOSIS — Z87.442: ICD-10-CM

## 2017-06-02 DIAGNOSIS — Z88.8: ICD-10-CM

## 2017-06-02 DIAGNOSIS — R11.0: ICD-10-CM

## 2017-06-02 DIAGNOSIS — Z98.61: ICD-10-CM

## 2017-06-02 DIAGNOSIS — M15.9: ICD-10-CM

## 2017-06-02 DIAGNOSIS — R51: Primary | ICD-10-CM

## 2017-06-02 DIAGNOSIS — I10: ICD-10-CM

## 2017-06-02 DIAGNOSIS — K21.9: ICD-10-CM

## 2017-06-02 DIAGNOSIS — M54.12: ICD-10-CM

## 2017-06-02 DIAGNOSIS — Z79.899: ICD-10-CM

## 2017-06-02 DIAGNOSIS — E78.5: ICD-10-CM

## 2017-06-05 DIAGNOSIS — M54.12 CERVICAL RADICULOPATHY: Primary | ICD-10-CM

## 2017-06-05 RX ORDER — METHOCARBAMOL 500 MG/1
500 TABLET, FILM COATED ORAL 4 TIMES DAILY
Qty: 60 TABLET | Refills: 1 | Status: SHIPPED | OUTPATIENT
Start: 2017-06-05 | End: 2018-01-05

## 2017-06-07 ENCOUNTER — HOSPITAL ENCOUNTER (EMERGENCY)
Dept: HOSPITAL 30 - ER | Age: 77
Discharge: HOME | End: 2017-06-07
Payer: MEDICARE

## 2017-06-07 DIAGNOSIS — I10: Primary | ICD-10-CM

## 2017-06-07 DIAGNOSIS — Z87.442: ICD-10-CM

## 2017-06-07 DIAGNOSIS — Z95.1: ICD-10-CM

## 2017-06-07 DIAGNOSIS — E11.9: ICD-10-CM

## 2017-06-07 DIAGNOSIS — Z79.899: ICD-10-CM

## 2017-06-07 DIAGNOSIS — K21.9: ICD-10-CM

## 2017-06-07 DIAGNOSIS — Z79.82: ICD-10-CM

## 2017-06-07 DIAGNOSIS — Z88.8: ICD-10-CM

## 2017-06-09 RX ORDER — TRAZODONE HYDROCHLORIDE 50 MG/1
TABLET ORAL
Qty: 30 TABLET | Refills: 4 | Status: SHIPPED | OUTPATIENT
Start: 2017-06-09 | End: 2017-10-10

## 2017-06-19 RX ORDER — CARVEDILOL 25 MG/1
TABLET ORAL
Qty: 180 TABLET | Refills: 1 | Status: SHIPPED | OUTPATIENT
Start: 2017-06-19 | End: 2017-12-13 | Stop reason: SDUPTHER

## 2017-07-28 RX ORDER — INSULIN GLARGINE 300 U/ML
INJECTION, SOLUTION SUBCUTANEOUS
Qty: 4.5 ML | Refills: 5 | Status: SHIPPED | OUTPATIENT
Start: 2017-07-28 | End: 2017-12-22 | Stop reason: SDUPTHER

## 2017-09-06 ENCOUNTER — RESULTS ENCOUNTER (OUTPATIENT)
Dept: INTERNAL MEDICINE | Facility: CLINIC | Age: 77
End: 2017-09-06

## 2017-09-06 ENCOUNTER — OFFICE VISIT (OUTPATIENT)
Dept: INTERNAL MEDICINE | Facility: CLINIC | Age: 77
End: 2017-09-06

## 2017-09-06 VITALS
BODY MASS INDEX: 26.9 KG/M2 | HEIGHT: 73 IN | RESPIRATION RATE: 14 BRPM | WEIGHT: 203 LBS | HEART RATE: 74 BPM | OXYGEN SATURATION: 95 % | TEMPERATURE: 98.7 F | DIASTOLIC BLOOD PRESSURE: 78 MMHG | SYSTOLIC BLOOD PRESSURE: 120 MMHG

## 2017-09-06 DIAGNOSIS — M54.16 LUMBAR RADICULOPATHY: ICD-10-CM

## 2017-09-06 DIAGNOSIS — G47.00 INSOMNIA, UNSPECIFIED TYPE: Chronic | ICD-10-CM

## 2017-09-06 DIAGNOSIS — E78.5 HYPERLIPIDEMIA, UNSPECIFIED HYPERLIPIDEMIA TYPE: Chronic | ICD-10-CM

## 2017-09-06 DIAGNOSIS — M19.90 ARTHRITIS: Chronic | ICD-10-CM

## 2017-09-06 DIAGNOSIS — G47.30 SLEEP APNEA, UNSPECIFIED TYPE: Primary | ICD-10-CM

## 2017-09-06 DIAGNOSIS — N43.3 CHRONIC HYDROCELE: Chronic | ICD-10-CM

## 2017-09-06 DIAGNOSIS — K21.9 GASTROESOPHAGEAL REFLUX DISEASE WITHOUT ESOPHAGITIS: Chronic | ICD-10-CM

## 2017-09-06 DIAGNOSIS — G62.9 PERIPHERAL POLYNEUROPATHY: Chronic | ICD-10-CM

## 2017-09-06 DIAGNOSIS — I25.119 CORONARY ARTERY DISEASE INVOLVING NATIVE CORONARY ARTERY OF NATIVE HEART WITH ANGINA PECTORIS (HCC): Chronic | ICD-10-CM

## 2017-09-06 DIAGNOSIS — C61 PROSTATE CANCER (HCC): ICD-10-CM

## 2017-09-06 DIAGNOSIS — I10 ESSENTIAL HYPERTENSION: Chronic | ICD-10-CM

## 2017-09-06 DIAGNOSIS — E55.9 VITAMIN D DEFICIENCY: ICD-10-CM

## 2017-09-06 DIAGNOSIS — E11.8 TYPE 2 DIABETES MELLITUS WITH COMPLICATION, WITHOUT LONG-TERM CURRENT USE OF INSULIN (HCC): Chronic | ICD-10-CM

## 2017-09-06 PROCEDURE — 99214 OFFICE O/P EST MOD 30 MIN: CPT | Performed by: INTERNAL MEDICINE

## 2017-09-06 RX ORDER — ACETAMINOPHEN AND CODEINE PHOSPHATE 300; 30 MG/1; MG/1
TABLET ORAL
Refills: 0 | COMMUNITY
Start: 2017-06-04 | End: 2018-01-05

## 2017-09-06 RX ORDER — HYDROCHLOROTHIAZIDE 25 MG/1
TABLET ORAL
Refills: 5 | COMMUNITY
Start: 2017-07-10 | End: 2017-10-10

## 2017-09-06 RX ORDER — CLONIDINE HYDROCHLORIDE 0.1 MG/1
TABLET ORAL
Refills: 0 | COMMUNITY
Start: 2017-06-07 | End: 2018-01-05

## 2017-09-06 NOTE — PROGRESS NOTES
Subjective   Tristan Haines is a 77 y.o. male.     Chief Complaint   Patient presents with   • Diabetes     Here for follow up        History of Present Illness   Patient here for follow-up of low back pain patient is taking medication which helps some.  Also follow-up of neuropathy on medication stable.  Coronary artery disease stable without any chest pain or short of breath.  Diabetes is stable medication need the blood tests.  GERD stable medication.  Hypertension stable medication and the hyperlipidemia stable medication.  Sleep apnea patient had a positive sleep study many years ago patient was given respirator for which patient is no longer using patient request a written note to stop the respirator so the patient doesn't have to pay the rental fee for the machine..  But the patient is interested in have sleep apnea evaluated.      Current Outpatient Prescriptions:   •  acetaminophen-codeine (TYLENOL #3) 300-30 MG per tablet, TK 1 T PO Q 4 H PRN FOR PAIN, Disp: , Rfl: 0  •  aspirin 81 MG tablet, Take  by mouth., Disp: , Rfl:   •  atorvastatin (LIPITOR) 40 MG tablet, TAKE 1 TABLET BY MOUTH ONE TIME A DAY , Disp: 30 tablet, Rfl: 0  •  BD PEN NEEDLE EDDI U/F 32G X 4 MM misc, use to inject insulin twice daily as directed , Disp: 100 each, Rfl: 11  •  carvedilol (COREG) 25 MG tablet, TAKE 1 TABLET BY MOUTH TWO TIMES A DAY AS DIRECTED , Disp: 180 tablet, Rfl: 1  •  Cholecalciferol (VITAMIN D3) 2000 UNITS tablet, Take  by mouth., Disp: , Rfl:   •  CloNIDine (CATAPRES) 0.1 MG tablet, TAKE 1 TABLET BY MOUTH EVERY EIGHT HOURS as needed if blood pressure >160/90 mmHg, Disp: , Rfl: 0  •  diclofenac (VOLTAREN) 1 % gel gel, Apply 4 g topically 4 (Four) Times a Day As Needed (Pain)., Disp: 1 tube, Rfl: 3  •  gabapentin (NEURONTIN) 400 MG capsule, TAKE 1 CAPSULE BY MOUTH THREE TIMES A DAY , Disp: 270 capsule, Rfl: 2  •  Glucose Blood (ONETOUCH ULTRA BLUE VI), OneTouch Ultra Blue In Vitro Strip; Patient Sig: OneTouch Ultra Blue  In Vitro Strip TEST BLOOS SUGAR 3 TIMES DAILY; 100; 4; 09-Jan-2015; Active, Disp: , Rfl:   •  hydrochlorothiazide (HYDRODIURIL) 25 MG tablet, TAKE 1 TABLET BY MOUTH ONE TIME A DAY, Disp: , Rfl: 5  •  insulin aspart (NOVOLOG FLEXPEN) 100 UNIT/ML solution pen-injector sc pen, Inject 5 Units under the skin 2 (two) times a day before meals., Disp: 3 pen, Rfl: 3  •  losartan (COZAAR) 100 MG tablet, Take 1 tablet by mouth Daily., Disp: 90 tablet, Rfl: 3  •  losartan (COZAAR) 50 MG tablet, Take 1 tablet by mouth Daily., Disp: 90 tablet, Rfl: 3  •  methocarbamol (ROBAXIN) 500 MG tablet, Take 1 tablet by mouth 4 (Four) Times a Day., Disp: 60 tablet, Rfl: 1  •  ONE TOUCH ULTRA TEST test strip, TEST BLOOS SUGAR 3 TIMES DAILY, Disp: 100 each, Rfl: 4  •  Probiotic Product (PROBIOTIC DAILY PO), Take  by mouth., Disp: , Rfl:   •  TOUJEO SOLOSTAR 300 UNIT/ML solution pen-injector, inject 25 units subcutaneously two times a day, Disp: 4.5 mL, Rfl: 5  •  traZODone (DESYREL) 50 MG tablet, TAKE 1 TABLET BY MOUTH AT BEDTIME , Disp: 30 tablet, Rfl: 4    The following portions of the patient's history were reviewed and updated as appropriate: allergies, current medications, past family history, past medical history, past social history, past surgical history and problem list.    Review of Systems   Constitutional: Negative.    Respiratory: Negative.    Cardiovascular: Negative.    Gastrointestinal: Negative.    Musculoskeletal: Negative.    Skin: Negative.    Neurological: Negative.    Psychiatric/Behavioral: Negative.        Objective   Physical Exam   Constitutional: He is oriented to person, place, and time. He appears well-developed and well-nourished.   HENT:   Head: Normocephalic and atraumatic.   Eyes: EOM are normal. Pupils are equal, round, and reactive to light.   Neck: Normal range of motion. Neck supple.   Cardiovascular: Normal rate, regular rhythm and normal heart sounds.    Pulmonary/Chest: Effort normal and breath sounds  normal.   Abdominal: Bowel sounds are normal.   Neurological: He is alert and oriented to person, place, and time.   Skin: Skin is warm.   Psychiatric: He has a normal mood and affect.       All tests have been reviewed.    Assessment/Plan   There are no diagnoses linked to this encounter.              lumbar disc surgery, 2004 L4-5.  LBP lumbar radiculopathy. continue Flexeril Mobic, MRI L2/3 mod to severe stenosis. continue follow-up with chiropractor, pain improved   neuropathy,neurotin 400mg bid continue unable to tolerate lyrica  sacroiliac inflammation continue chiro. tylenol arthritis.  edema resolved after d/c amlodipine ---  Coronary artery disease a status post a CABG pleural effusion follow up chest x-ray continue cardio follow up  Anemia, resolved. Bright red blood per rectum Hemoccult positive m. w/u lab done low ferritin sbft if still anemia. s/p colonoscopy3/2017:ileitis and s/p upper endoscopy:esophagitis  eczema refill medicine  Diabetes continue toujeo 50u qhs, novolog 5 units tid. Do lab  diarrhea, metformin caused, resolved after d/c metformin  GERD hx of HH surgery continue medicine  s/p EGD  Hypertension continue medicine, losartan 100mg  Hyperlipidemia cotninue atorvastatin 40 mg   Peripheral neuropathy continue medications  tinnitus, loss of hearing  vision change see eye doctor  leg cramps skin lesions,   hydrocele chronic patient decline to see uro   sleep disturbances, continue trazodone  Td to 2010?, prevnar 13 done, zostavax done, refuse flushot. pneumovax done--  prostate ca s/p removal PSA=0.00  abd pain resolved. CT NSD,   Sleep apnea, discontinue respirator patient is unable to use and written form made for Zak. refer to dr rivera  Constipation, resolved after miralax    Do lab now   CT:gall stone, hydrocele and divertic     1 mo after labs wellness

## 2017-09-19 RX ORDER — ATORVASTATIN CALCIUM 40 MG/1
TABLET, FILM COATED ORAL
Qty: 30 TABLET | Refills: 5 | Status: SHIPPED | OUTPATIENT
Start: 2017-09-19 | End: 2017-10-10

## 2017-09-21 LAB
25(OH)D3+25(OH)D2 SERPL-MCNC: 56.9 NG/ML
ALBUMIN SERPL-MCNC: 4 G/DL (ref 3.5–5)
ALBUMIN/GLOB SERPL: 1.5 G/DL (ref 1–2)
ALP SERPL-CCNC: 54 U/L (ref 38–126)
ALT SERPL-CCNC: 26 U/L (ref 13–69)
APPEARANCE UR: CLEAR
AST SERPL-CCNC: 25 U/L (ref 15–46)
BASOPHILS # BLD AUTO: 0.04 10*3/MM3 (ref 0–0.2)
BASOPHILS NFR BLD AUTO: 0.6 % (ref 0–2.5)
BILIRUB SERPL-MCNC: 0.9 MG/DL (ref 0.2–1.3)
BILIRUB UR QL STRIP: NEGATIVE
BUN SERPL-MCNC: 18 MG/DL (ref 7–20)
BUN/CREAT SERPL: 12.9 (ref 6.3–21.9)
CALCIUM SERPL-MCNC: 9.3 MG/DL (ref 8.4–10.2)
CHLORIDE SERPL-SCNC: 106 MMOL/L (ref 98–107)
CHOLEST SERPL-MCNC: 112 MG/DL (ref 0–199)
CO2 SERPL-SCNC: 27 MMOL/L (ref 26–30)
COLOR UR: YELLOW
CREAT SERPL-MCNC: 1.4 MG/DL (ref 0.6–1.3)
EOSINOPHIL # BLD AUTO: 0.21 10*3/MM3 (ref 0–0.7)
EOSINOPHIL NFR BLD AUTO: 3.3 % (ref 0–7)
ERYTHROCYTE [DISTWIDTH] IN BLOOD BY AUTOMATED COUNT: 13.3 % (ref 11.5–14.5)
GLOBULIN SER CALC-MCNC: 2.6 GM/DL
GLUCOSE SERPL-MCNC: 96 MG/DL (ref 74–98)
GLUCOSE UR QL: NEGATIVE
HBA1C MFR BLD: 6.8 %
HCT VFR BLD AUTO: 42 % (ref 42–52)
HDLC SERPL-MCNC: 45 MG/DL (ref 40–60)
HGB BLD-MCNC: 13.8 G/DL (ref 14–18)
HGB UR QL STRIP: NEGATIVE
IMM GRANULOCYTES # BLD: 0.02 10*3/MM3 (ref 0–0.06)
IMM GRANULOCYTES NFR BLD: 0.3 % (ref 0–0.6)
KETONES UR QL STRIP: NEGATIVE
LDLC SERPL CALC-MCNC: 50 MG/DL (ref 0–99)
LEUKOCYTE ESTERASE UR QL STRIP: NEGATIVE
LYMPHOCYTES # BLD AUTO: 2.09 10*3/MM3 (ref 0.6–3.4)
LYMPHOCYTES NFR BLD AUTO: 32.4 % (ref 10–50)
MCH RBC QN AUTO: 30.6 PG (ref 27–31)
MCHC RBC AUTO-ENTMCNC: 32.9 G/DL (ref 30–37)
MCV RBC AUTO: 93.1 FL (ref 80–94)
MICROALBUMIN UR-MCNC: <3 UG/ML
MONOCYTES # BLD AUTO: 0.53 10*3/MM3 (ref 0–0.9)
MONOCYTES NFR BLD AUTO: 8.2 % (ref 0–12)
NEUTROPHILS # BLD AUTO: 3.56 10*3/MM3 (ref 2–6.9)
NEUTROPHILS NFR BLD AUTO: 55.2 % (ref 37–80)
NITRITE UR QL STRIP: NEGATIVE
NRBC BLD AUTO-RTO: 0 /100 WBC (ref 0–0)
PH UR STRIP: 6 [PH] (ref 5–8)
PLATELET # BLD AUTO: 135 10*3/MM3 (ref 130–400)
POTASSIUM SERPL-SCNC: 4.5 MMOL/L (ref 3.5–5.1)
PROT SERPL-MCNC: 6.6 G/DL (ref 6.3–8.2)
PROT UR QL STRIP: NEGATIVE
PSA SERPL-MCNC: <0.064 NG/ML (ref 0.06–4)
RBC # BLD AUTO: 4.51 10*6/MM3 (ref 4.7–6.1)
SODIUM SERPL-SCNC: 140 MMOL/L (ref 137–145)
SP GR UR: 1.01 (ref 1–1.03)
TRIGL SERPL-MCNC: 83 MG/DL
TSH SERPL DL<=0.005 MIU/L-ACNC: 1.24 MIU/ML (ref 0.47–4.68)
UROBILINOGEN UR STRIP-MCNC: NORMAL MG/DL
VLDLC SERPL CALC-MCNC: 16.6 MG/DL
WBC # BLD AUTO: 6.45 10*3/MM3 (ref 4.8–10.8)

## 2017-10-10 ENCOUNTER — OFFICE VISIT (OUTPATIENT)
Dept: INTERNAL MEDICINE | Facility: CLINIC | Age: 77
End: 2017-10-10

## 2017-10-10 ENCOUNTER — OFFICE VISIT (OUTPATIENT)
Dept: CARDIAC REHAB | Facility: HOSPITAL | Age: 77
End: 2017-10-10

## 2017-10-10 ENCOUNTER — TRANSCRIBE ORDERS (OUTPATIENT)
Dept: CARDIAC REHAB | Facility: HOSPITAL | Age: 77
End: 2017-10-10

## 2017-10-10 VITALS
WEIGHT: 200 LBS | OXYGEN SATURATION: 96 % | HEIGHT: 73 IN | RESPIRATION RATE: 14 BRPM | HEART RATE: 58 BPM | DIASTOLIC BLOOD PRESSURE: 64 MMHG | BODY MASS INDEX: 26.51 KG/M2 | TEMPERATURE: 97.8 F | SYSTOLIC BLOOD PRESSURE: 116 MMHG

## 2017-10-10 DIAGNOSIS — G47.00 INSOMNIA, UNSPECIFIED TYPE: Chronic | ICD-10-CM

## 2017-10-10 DIAGNOSIS — M54.16 LUMBAR RADICULOPATHY: ICD-10-CM

## 2017-10-10 DIAGNOSIS — E78.5 HYPERLIPIDEMIA, UNSPECIFIED HYPERLIPIDEMIA TYPE: Primary | Chronic | ICD-10-CM

## 2017-10-10 DIAGNOSIS — E11.8 TYPE 2 DIABETES MELLITUS WITH COMPLICATION, WITHOUT LONG-TERM CURRENT USE OF INSULIN (HCC): Chronic | ICD-10-CM

## 2017-10-10 DIAGNOSIS — E55.9 VITAMIN D DEFICIENCY: ICD-10-CM

## 2017-10-10 DIAGNOSIS — D64.9 ANEMIA, UNSPECIFIED TYPE: ICD-10-CM

## 2017-10-10 DIAGNOSIS — Z95.1 HX OF CORONARY ARTERY BYPASS GRAFT: Primary | ICD-10-CM

## 2017-10-10 DIAGNOSIS — I25.119 CORONARY ARTERY DISEASE INVOLVING NATIVE CORONARY ARTERY OF NATIVE HEART WITH ANGINA PECTORIS (HCC): Chronic | ICD-10-CM

## 2017-10-10 DIAGNOSIS — I10 ESSENTIAL HYPERTENSION: Chronic | ICD-10-CM

## 2017-10-10 DIAGNOSIS — K21.9 GASTROESOPHAGEAL REFLUX DISEASE WITHOUT ESOPHAGITIS: Chronic | ICD-10-CM

## 2017-10-10 DIAGNOSIS — Z95.5 HX OF HEART ARTERY STENT: Primary | ICD-10-CM

## 2017-10-10 DIAGNOSIS — C61 MALIGNANT NEOPLASM OF PROSTATE (HCC): ICD-10-CM

## 2017-10-10 PROCEDURE — 93798 PHYS/QHP OP CAR RHAB W/ECG: CPT

## 2017-10-10 PROCEDURE — G0439 PPPS, SUBSEQ VISIT: HCPCS | Performed by: INTERNAL MEDICINE

## 2017-10-10 NOTE — PROGRESS NOTES
QUICK REFERENCE INFORMATION:  The ABCs of the Annual Wellness Visit    Subsequent Medicare Wellness Visit    HEALTH RISK ASSESSMENT    1940    Recent Hospitalizations:  No hospitalization(s) within the last year..        Current Medical Providers:  Patient Care Team:  Cayden Chiang MD as PCP - General  Cayden Chiang MD as PCP - Claims Attributed        Smoking Status:  History   Smoking Status   • Never Smoker   Smokeless Tobacco   • Not on file       Alcohol Consumption:  History   Alcohol Use No       Depression Screen:   PHQ-2/PHQ-9 Depression Screening 10/10/2017   Little interest or pleasure in doing things 0   Feeling down, depressed, or hopeless 0   Trouble falling or staying asleep, or sleeping too much -   Feeling tired or having little energy -   Poor appetite or overeating -   Feeling bad about yourself - or that you are a failure or have let yourself or your family down -   Trouble concentrating on things, such as reading the newspaper or watching television -   Moving or speaking so slowly that other people could have noticed. Or the opposite - being so fidgety or restless that you have been moving around a lot more than usual -   Thoughts that you would be better off dead, or of hurting yourself in some way -   Total Score 0   If you checked off any problems, how difficult have these problems made it for you to do your work, take care of things at home, or get along with other people? -       Health Habits and Functional and Cognitive Screening:  Functional & Cognitive Status 10/10/2017   Do you have difficulty preparing food and eating? No   Do you have difficulty bathing yourself? No   Do you have difficulty getting dressed? No   Do you have difficulty using the toilet? No   Do you have difficulty moving around from place to place? No   In the past year have you fallen or experienced a near fall? No   Do you need help using the phone?  No   Are you deaf or do you have serious difficulty hearing?   No   Do you need help with transportation? No   Do you need help shopping? No   Do you need help preparing meals?  No   Do you need help with housework?  No   Do you need help with laundry? No   Do you need help taking your medications? No   Do you need help managing money? No   Do you have difficulty concentrating, remembering or making decisions? No       Health Habits  Current Diet: Well Balanced Diet  Dental Exam: Not up to date  Eye Exam: Up to date  Exercise (times per week): 3 a week  Current Exercise Activities Include: yard work      Does the patient have evidence of cognitive impairment? No    Aspirin use counseling: Taking ASA appropriately as indicated      Recent Lab Results:  CMP:  Lab Results   Component Value Date    GLU 96 09/20/2017    BUN 18 09/20/2017    CREATININE 1.40 (H) 09/20/2017    EGFRIFNONA 49 (L) 09/20/2017    EGFRIFAFRI 60 (L) 09/20/2017    BCR 12.9 09/20/2017     09/20/2017    K 4.5 09/20/2017    CO2 27.0 09/20/2017    CALCIUM 9.3 09/20/2017    PROTENTOTREF 6.6 09/20/2017    ALBUMIN 4.00 09/20/2017    LABGLOBREF 2.6 09/20/2017    LABIL2 1.5 09/20/2017    BILITOT 0.9 09/20/2017    ALKPHOS 54 09/20/2017    AST 25 09/20/2017    ALT 26 09/20/2017     Lipid Panel:  Lab Results   Component Value Date    CHOL 132 01/23/2017    TRIG 83 09/20/2017    HDL 45 09/20/2017    VLDL 16.6 09/20/2017    LDLDIRECT 60 01/23/2017     HbA1c:  Lab Results   Component Value Date    HGBA1C 6.80 09/20/2017       Visual Acuity:  No exam data present    Age-appropriate Screening Schedule:  Refer to the list below for future screening recommendations based on patient's age, sex and/or medical conditions. Orders for these recommended tests are listed in the plan section. The patient has been provided with a written plan.    Health Maintenance   Topic Date Due   • TDAP/TD VACCINES (1 - Tdap) 01/11/1959   • DIABETIC FOOT EXAM  05/16/2016   • ZOSTER VACCINE  05/16/2016   • INFLUENZA VACCINE  08/01/2017   •  DIABETIC EYE EXAM  02/22/2018   • HEMOGLOBIN A1C  03/20/2018   • LIPID PANEL  09/20/2018   • URINE MICROALBUMIN  09/20/2018   • PNEUMOCOCCAL VACCINES (65+ LOW/MEDIUM RISK)  Completed        Subjective   History of Present Illness    Tristan Haines is a 77 y.o. male who presents for an Subsequent Wellness Visit.    The following portions of the patient's history were reviewed and updated as appropriate: allergies, current medications, past family history, past medical history, past social history, past surgical history and problem list.    Outpatient Medications Prior to Visit   Medication Sig Dispense Refill   • acetaminophen-codeine (TYLENOL #3) 300-30 MG per tablet TK 1 T PO Q 4 H PRN FOR PAIN  0   • aspirin 81 MG tablet Take  by mouth.     • atorvastatin (LIPITOR) 40 MG tablet TAKE 1 TABLET BY MOUTH ONE TIME A DAY  30 tablet 0   • BD PEN NEEDLE EDDI U/F 32G X 4 MM misc use to inject insulin twice daily as directed  100 each 11   • carvedilol (COREG) 25 MG tablet TAKE 1 TABLET BY MOUTH TWO TIMES A DAY AS DIRECTED  180 tablet 1   • Cholecalciferol (VITAMIN D3) 2000 UNITS tablet Take  by mouth.     • CloNIDine (CATAPRES) 0.1 MG tablet TAKE 1 TABLET BY MOUTH EVERY EIGHT HOURS as needed if blood pressure >160/90 mmHg  0   • diclofenac (VOLTAREN) 1 % gel gel Apply 4 g topically 4 (Four) Times a Day As Needed (Pain). 1 tube 3   • gabapentin (NEURONTIN) 400 MG capsule TAKE 1 CAPSULE BY MOUTH THREE TIMES A DAY  270 capsule 2   • Glucose Blood (ONETOUCH ULTRA BLUE VI) OneTouch Ultra Blue In Vitro Strip; Patient Sig: OneTouch Ultra Blue In Vitro Strip TEST BLOOS SUGAR 3 TIMES DAILY; 100; 4; 09-Jan-2015; Active     • insulin aspart (NOVOLOG FLEXPEN) 100 UNIT/ML solution pen-injector sc pen Inject 5 Units under the skin 2 (two) times a day before meals. 3 pen 3   • methocarbamol (ROBAXIN) 500 MG tablet Take 1 tablet by mouth 4 (Four) Times a Day. 60 tablet 1   • ONE TOUCH ULTRA TEST test strip TEST BLOOS SUGAR 3 TIMES DAILY 100  each 4   • Probiotic Product (PROBIOTIC DAILY PO) Take  by mouth.     • TOUJEO SOLOSTAR 300 UNIT/ML solution pen-injector inject 25 units subcutaneously two times a day 4.5 mL 5   • traZODone (DESYREL) 50 MG tablet TAKE 1 TABLET BY MOUTH AT BEDTIME  30 tablet 4   • atorvastatin (LIPITOR) 40 MG tablet TAKE 1 TABLET BY MOUTH ONE TIME A DAY  30 tablet 5   • hydrochlorothiazide (HYDRODIURIL) 25 MG tablet TAKE 1 TABLET BY MOUTH ONE TIME A DAY  5   • losartan (COZAAR) 100 MG tablet Take 1 tablet by mouth Daily. 90 tablet 3   • losartan (COZAAR) 50 MG tablet Take 1 tablet by mouth Daily. 90 tablet 3     No facility-administered medications prior to visit.        Patient Active Problem List   Diagnosis   • Arthritis   • Colon polyp   • Diabetes mellitus   • Edema of lower extremity   • Occult blood in stools   • Gastroesophageal reflux disease   • Hyperlipidemia   • Hypertension   • Insomnia   • Lumbar radiculopathy   • Neck pain   • Calculus of kidney   • Peripheral neuropathy   • Malignant neoplasm of prostate   • Inflammation of sacroiliac joint   • Positive reaction to tuberculin skin test   • Vitamin D deficiency   • Hearing problem of both ears   • CAD (coronary artery disease)   • Inguinal hernia   • Chronic hydrocele       Advance Care Planning:  has an advance directive - a copy HAS NOT been provided    Identification of Risk Factors:  Risk factors include: weight  and unhealthy diet.    Review of Systems   Constitutional: Negative.    HENT: Negative.    Eyes: Negative.    Respiratory: Negative.    Cardiovascular: Negative.    Gastrointestinal: Negative.    Endocrine: Negative.    Genitourinary: Negative.    Musculoskeletal: Positive for arthralgias and back pain.   Skin: Negative.    Allergic/Immunologic: Negative.    Neurological: Negative.    Hematological: Negative.    Psychiatric/Behavioral: Negative.    All other systems reviewed and are negative.      Compared to one year ago, the patient feels his physical  "health is the same.  Compared to one year ago, the patient feels his mental health is the same.    Objective     Physical Exam   Constitutional: He is oriented to person, place, and time. He appears well-developed and well-nourished.   HENT:   Head: Normocephalic and atraumatic.   Right Ear: External ear normal.   Left Ear: External ear normal.   Nose: Nose normal.   Mouth/Throat: Oropharynx is clear and moist.   Eyes: Conjunctivae and EOM are normal. Pupils are equal, round, and reactive to light.   Neck: Normal range of motion. Neck supple. No thyromegaly present.   Cardiovascular: Normal rate, regular rhythm, normal heart sounds and intact distal pulses.    Pulmonary/Chest: Effort normal and breath sounds normal.   Abdominal: Soft. Bowel sounds are normal.   Musculoskeletal: Normal range of motion.   Neurological: He is alert and oriented to person, place, and time. He has normal reflexes.   Skin: Skin is warm and dry.   Psychiatric: He has a normal mood and affect. His behavior is normal. Judgment and thought content normal.   Nursing note and vitals reviewed.      Vitals:    10/10/17 1332   BP: 116/64   Pulse: 58   Resp: 14   Temp: 97.8 °F (36.6 °C)   SpO2: 96%   Weight: 200 lb (90.7 kg)   Height: 73\" (185.4 cm)       Body mass index is 26.39 kg/(m^2).  Discussed the patient's BMI with him. The BMI is in the acceptable range.    Assessment/Plan   Patient Self-Management and Personalized Health Advice  The patient has been provided with information about: diet, exercise and weight management and preventive services including:   · Advance directive.    Visit Diagnoses:  No diagnosis found.    No orders of the defined types were placed in this encounter.      Outpatient Encounter Prescriptions as of 10/10/2017   Medication Sig Dispense Refill   • acetaminophen-codeine (TYLENOL #3) 300-30 MG per tablet TK 1 T PO Q 4 H PRN FOR PAIN  0   • aspirin 81 MG tablet Take  by mouth.     • atorvastatin (LIPITOR) 40 MG tablet " TAKE 1 TABLET BY MOUTH ONE TIME A DAY  30 tablet 0   • BD PEN NEEDLE EDDI U/F 32G X 4 MM misc use to inject insulin twice daily as directed  100 each 11   • carvedilol (COREG) 25 MG tablet TAKE 1 TABLET BY MOUTH TWO TIMES A DAY AS DIRECTED  180 tablet 1   • Cholecalciferol (VITAMIN D3) 2000 UNITS tablet Take  by mouth.     • CloNIDine (CATAPRES) 0.1 MG tablet TAKE 1 TABLET BY MOUTH EVERY EIGHT HOURS as needed if blood pressure >160/90 mmHg  0   • diclofenac (VOLTAREN) 1 % gel gel Apply 4 g topically 4 (Four) Times a Day As Needed (Pain). 1 tube 3   • gabapentin (NEURONTIN) 400 MG capsule TAKE 1 CAPSULE BY MOUTH THREE TIMES A DAY  270 capsule 2   • Glucose Blood (ONETOUCH ULTRA BLUE VI) OneTouch Ultra Blue In Vitro Strip; Patient Sig: OneTouch Ultra Blue In Vitro Strip TEST BLOOS SUGAR 3 TIMES DAILY; 100; 4; 09-Jan-2015; Active     • insulin aspart (NOVOLOG FLEXPEN) 100 UNIT/ML solution pen-injector sc pen Inject 5 Units under the skin 2 (two) times a day before meals. 3 pen 3   • methocarbamol (ROBAXIN) 500 MG tablet Take 1 tablet by mouth 4 (Four) Times a Day. 60 tablet 1   • ONE TOUCH ULTRA TEST test strip TEST BLOOS SUGAR 3 TIMES DAILY 100 each 4   • Probiotic Product (PROBIOTIC DAILY PO) Take  by mouth.     • TOUJEO SOLOSTAR 300 UNIT/ML solution pen-injector inject 25 units subcutaneously two times a day 4.5 mL 5   • traZODone (DESYREL) 50 MG tablet TAKE 1 TABLET BY MOUTH AT BEDTIME  30 tablet 4   • [DISCONTINUED] atorvastatin (LIPITOR) 40 MG tablet TAKE 1 TABLET BY MOUTH ONE TIME A DAY  30 tablet 5   • hydrochlorothiazide (HYDRODIURIL) 25 MG tablet TAKE 1 TABLET BY MOUTH ONE TIME A DAY  5   • losartan (COZAAR) 100 MG tablet Take 1 tablet by mouth Daily. 90 tablet 3   • losartan (COZAAR) 50 MG tablet Take 1 tablet by mouth Daily. 90 tablet 3     No facility-administered encounter medications on file as of 10/10/2017.        Reviewed use of high risk medication in the elderly: yes  Reviewed for potential of  harmful drug interactions in the elderly: no    Follow Up:  No Follow-up on file.     An After Visit Summary and PPPS with all of these plans were given to the patient.            lumbar disc surgery, 2004 L4-5.  LBP lumbar radiculopathy. continue Flexeril Mobic, MRI L2/3 mod to severe stenosis. continue follow-up with chiropractor, pain improved   neuropathy,neurotin 400mg bid continue unable to tolerate lyrica  sacroiliac inflammation continue chiro. tylenol arthritis.  edema resolved after d/c amlodipine ---  Coronary artery disease a status post a CABG pleural effusion follow up chest x-ray continue cardio follow up  Anemia, resolved. Bright red blood per rectum Hemoccult positive m. w/u lab done.  low ferritin sbft if still anemia. s/p colonoscopy3/2017:ileitis and s/p upper endoscopy:esophagitis, Hb low, repeat   eczema refill medicine  Diabetes continue toujeo 50u qhs, novolog 5 units tid.   diarrhea, metformin caused, resolved after d/c metformin  GERD hx of HH surgery continue medicine  s/p EGD  Hypertension continue medicine, losartan 100mg  Hyperlipidemia cotninue atorvastatin 40 mg   Peripheral neuropathy continue medications  tinnitus, loss of hearing  vision change see eye doctor  leg cramps skin lesions,   hydrocele chronic patient decline to see uro   sleep disturbances, continue trazodone  Td to 2010?, prevnar 13 done, zostavax done, refuse flushot. pneumovax done--  prostate ca s/p removal PSA=0.00, now urine hesitation refer to uro.   Sleep apnea, discontinue respirator patient is unable to use and written form made for Zak. refer to dr rivera  Constipation, resolved after miralax      CT:gall stone, hydrocele and divertic   CKD watch   Td to HD      6 mo

## 2017-10-11 ENCOUNTER — OFFICE VISIT (OUTPATIENT)
Dept: CARDIAC REHAB | Facility: HOSPITAL | Age: 77
End: 2017-10-11

## 2017-10-11 ENCOUNTER — TELEPHONE (OUTPATIENT)
Dept: INTERNAL MEDICINE | Facility: CLINIC | Age: 77
End: 2017-10-11

## 2017-10-11 DIAGNOSIS — Z95.1 HX OF CORONARY ARTERY BYPASS GRAFT: Primary | ICD-10-CM

## 2017-10-11 RX ORDER — LOSARTAN POTASSIUM AND HYDROCHLOROTHIAZIDE 12.5; 5 MG/1; MG/1
1 TABLET ORAL 3 TIMES WEEKLY
COMMUNITY
End: 2018-01-05

## 2017-10-11 RX ORDER — LANSOPRAZOLE 30 MG/1
30 CAPSULE, DELAYED RELEASE ORAL AS NEEDED
COMMUNITY
End: 2018-01-05

## 2017-10-11 NOTE — TELEPHONE ENCOUNTER
Patient is requesting a prescription for compression socks.   Patient would like a call when this can be picked up.

## 2017-10-12 PROBLEM — M41.9 SCOLIOSIS OF THORACOLUMBAR SPINE: Status: ACTIVE | Noted: 2017-10-12

## 2017-10-12 PROBLEM — A41.9 SEPSIS: Status: ACTIVE | Noted: 2017-10-12

## 2017-10-12 PROBLEM — E11.9 DM (DIABETES MELLITUS) (HCC): Status: ACTIVE | Noted: 2017-10-12

## 2017-10-12 PROBLEM — R56.9 SEIZURE: Status: ACTIVE | Noted: 2017-10-12

## 2017-10-12 PROBLEM — R91.8 PULMONARY INFILTRATE: Status: ACTIVE | Noted: 2017-10-12

## 2017-10-12 PROBLEM — R00.0 TACHYCARDIA: Status: ACTIVE | Noted: 2017-10-12

## 2017-10-12 PROBLEM — M54.50 CHRONIC LOW BACK PAIN: Status: ACTIVE | Noted: 2017-10-12

## 2017-10-12 PROBLEM — G40.901 STATUS EPILEPTICUS: Status: ACTIVE | Noted: 2017-10-12

## 2017-10-12 PROBLEM — R79.89 ELEVATED LFTS: Status: ACTIVE | Noted: 2017-10-12

## 2017-10-12 PROBLEM — I95.9 HYPOTENSION: Status: ACTIVE | Noted: 2017-10-12

## 2017-10-12 PROBLEM — J96.00 ACUTE RESPIRATORY FAILURE (HCC): Status: ACTIVE | Noted: 2017-10-12

## 2017-10-12 PROBLEM — K76.0 FATTY LIVER: Status: ACTIVE | Noted: 2017-10-12

## 2017-10-12 PROBLEM — G89.29 CHRONIC LOW BACK PAIN: Status: ACTIVE | Noted: 2017-10-12

## 2017-10-13 ENCOUNTER — OFFICE VISIT (OUTPATIENT)
Dept: CARDIAC REHAB | Facility: HOSPITAL | Age: 77
End: 2017-10-13

## 2017-10-13 DIAGNOSIS — Z95.1 HX OF CORONARY ARTERY BYPASS GRAFT: Primary | ICD-10-CM

## 2017-10-16 ENCOUNTER — OFFICE VISIT (OUTPATIENT)
Dept: CARDIAC REHAB | Facility: HOSPITAL | Age: 77
End: 2017-10-16

## 2017-10-16 DIAGNOSIS — Z95.1 HX OF CORONARY ARTERY BYPASS GRAFT: Primary | ICD-10-CM

## 2017-10-16 RX ORDER — TRAZODONE HYDROCHLORIDE 50 MG/1
TABLET ORAL
Qty: 30 TABLET | Refills: 3 | Status: SHIPPED | OUTPATIENT
Start: 2017-10-16 | End: 2019-02-19 | Stop reason: SDUPTHER

## 2017-10-18 ENCOUNTER — OFFICE VISIT (OUTPATIENT)
Dept: CARDIAC REHAB | Facility: HOSPITAL | Age: 77
End: 2017-10-18

## 2017-10-18 DIAGNOSIS — Z95.1 HX OF CORONARY ARTERY BYPASS GRAFT: Primary | ICD-10-CM

## 2017-10-20 ENCOUNTER — OFFICE VISIT (OUTPATIENT)
Dept: CARDIAC REHAB | Facility: HOSPITAL | Age: 77
End: 2017-10-20

## 2017-10-20 DIAGNOSIS — Z95.1 HX OF CORONARY ARTERY BYPASS GRAFT: Primary | ICD-10-CM

## 2017-10-23 ENCOUNTER — APPOINTMENT (OUTPATIENT)
Dept: CARDIAC REHAB | Facility: HOSPITAL | Age: 77
End: 2017-10-23

## 2017-10-25 ENCOUNTER — OFFICE VISIT (OUTPATIENT)
Dept: NEUROLOGY | Facility: CLINIC | Age: 77
End: 2017-10-25

## 2017-10-25 ENCOUNTER — APPOINTMENT (OUTPATIENT)
Dept: CARDIAC REHAB | Facility: HOSPITAL | Age: 77
End: 2017-10-25

## 2017-10-25 VITALS
WEIGHT: 200 LBS | HEART RATE: 67 BPM | DIASTOLIC BLOOD PRESSURE: 84 MMHG | BODY MASS INDEX: 26.51 KG/M2 | HEIGHT: 73 IN | OXYGEN SATURATION: 97 % | SYSTOLIC BLOOD PRESSURE: 136 MMHG

## 2017-10-25 DIAGNOSIS — G47.34 NOCTURNAL OXYGEN DESATURATION: ICD-10-CM

## 2017-10-25 DIAGNOSIS — G47.19 EXCESSIVE DAYTIME SLEEPINESS: ICD-10-CM

## 2017-10-25 DIAGNOSIS — G47.33 OBSTRUCTIVE SLEEP APNEA: Primary | ICD-10-CM

## 2017-10-25 PROCEDURE — 99213 OFFICE O/P EST LOW 20 MIN: CPT | Performed by: PSYCHIATRY & NEUROLOGY

## 2017-10-25 NOTE — PROGRESS NOTES
[facility] NEUROLOGY [place] PROGRESS NOTE  History of Present Illness     Date: 10/25/2017    Patient Identification  Tristan Haines is a 77 y.o. male.    Patient information was obtained from patient and spouse/SO.  History/Exam limitations: none.    Original consultation requested by: Cayden Chiang MD      Chief Complaint   Sleep Apnea (Pt in office today for follow up. Pt states last sleep study was 2014 and was started on O2 at night, pt would like to discuss if he acutally needs O2 )      History of Present Illness    Patient is a pleasant 77-year-old gentleman who had been using nasal oxygen at night but he found himself developing a sinus infection and often he would pull it off from his face.  He want to have a repeat sleep study to see if he still need to be on nasal cannula oxygen when he is sleeping at nighttime.  Discuss about home sleep study to evaluate for apnea and desaturation while sleeping at night and patient expressed understanding and interested in proceeding with home sleep testing.    PMH:   Past Medical History:   Diagnosis Date   • Abdominal pain     Hx of.   • Atypical chest pain     Hx of.   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 positive    • Vitamin D deficiency        Past Surgical History:   Past Surgical History:   Procedure Laterality Date   • COLONOSCOPY      2017-Chris   • CORONARY ARTERY BYPASS GRAFT     • INGUINAL HERNIA REPAIR  1989    Hiatal hernia   • PROSTATE SURGERY      Prostate cancer s/p removal do PSA   • SMALL INTESTINE SURGERY         Family Hisotry:   Family History   Problem Relation Age of Onset   • Cancer Mother    • Diabetes Father    • Hypertension Father    •  Liver disease Father    • Cancer Sister    • Diabetes Brother    • Heart attack Other      grandparent   • Arthritis Other    • Heart attack Other        Social History:   Social History     Social History   • Marital status:      Spouse name: N/A   • Number of children: N/A   • Years of education: N/A     Occupational History   • Not on file.     Social History Main Topics   • Smoking status: Never Smoker   • Smokeless tobacco: Not on file   • Alcohol use No   • Drug use: No   • Sexual activity: Defer     Other Topics Concern   • Not on file     Social History Narrative       Medications:   Current Outpatient Prescriptions   Medication Sig Dispense Refill   • aspirin 81 MG tablet Take  by mouth.     • atorvastatin (LIPITOR) 40 MG tablet TAKE 1 TABLET BY MOUTH ONE TIME A DAY  (Patient taking differently: TAKE 1 TABLET BY MOUTH ONE HS) 30 tablet 0   • BD PEN NEEDLE EDDI U/F 32G X 4 MM misc use to inject insulin twice daily as directed  100 each 11   • carvedilol (COREG) 25 MG tablet TAKE 1 TABLET BY MOUTH TWO TIMES A DAY AS DIRECTED  180 tablet 1   • Cholecalciferol (VITAMIN D3) 2000 UNITS tablet Take  by mouth.     • diclofenac (VOLTAREN) 1 % gel gel Apply 4 g topically 4 (Four) Times a Day As Needed (Pain). 1 tube 3   • gabapentin (NEURONTIN) 400 MG capsule TAKE 1 CAPSULE BY MOUTH THREE TIMES A DAY  270 capsule 2   • Glucose Blood (ONETOUCH ULTRA BLUE VI) OneTouch Ultra Blue In Vitro Strip; Patient Sig: OneTouch Ultra Blue In Vitro Strip TEST BLOOS SUGAR 3 TIMES DAILY; 100; 4; 09-Jan-2015; Active     • insulin aspart (NOVOLOG FLEXPEN) 100 UNIT/ML solution pen-injector sc pen Inject 5 Units under the skin 2 (two) times a day before meals. (Patient taking differently: Inject 6 Units under the skin 2 (Two) Times a Day Before Meals.) 3 pen 3   • lansoprazole (PREVACID) 30 MG capsule Take 30 mg by mouth As Needed.     • losartan (COZAAR) 100 MG tablet Take 1 tablet by mouth Daily. 90 tablet 3   • losartan  (COZAAR) 50 MG tablet Take 1 tablet by mouth Daily. (Patient taking differently: Take 50 mg by mouth Daily. Takes on Sun, Tues, Thurs, Sat. and Sunday) 90 tablet 3   • losartan-hydrochlorothiazide (HYZAAR) 50-12.5 MG per tablet Take 1 tablet by mouth 3 (Three) Times a Week.     • ONE TOUCH ULTRA TEST test strip TEST BLOOS SUGAR 3 TIMES DAILY 100 each 4   • Probiotic Product (PROBIOTIC DAILY PO) Take  by mouth.     • TOUJEO SOLOSTAR 300 UNIT/ML solution pen-injector inject 25 units subcutaneously two times a day (Patient taking differently: inject 24 units subcutaneously two times a day) 4.5 mL 5   • traZODone (DESYREL) 50 MG tablet TAKE 1 TABLET BY MOUTH AT BEDTIME  (Patient taking differently: TAKE 1 TABLET BY MOUTH AT BEDTIME PRN) 30 tablet 3   • acetaminophen-codeine (TYLENOL #3) 300-30 MG per tablet TK 1 T PO Q 4 H PRN FOR PAIN  0   • CloNIDine (CATAPRES) 0.1 MG tablet TAKE 1 TABLET BY MOUTH EVERY EIGHT HOURS as needed if blood pressure >160/90 mmHg  0   • methocarbamol (ROBAXIN) 500 MG tablet Take 1 tablet by mouth 4 (Four) Times a Day. 60 tablet 1     No current facility-administered medications for this visit.        Allergy: No Known Allergies    Review of Systems:  Review of Systems   Constitutional: Positive for fatigue. Negative for chills and fever.   HENT: Negative for congestion, ear pain, hearing loss, rhinorrhea and sore throat.    Eyes: Negative for pain, discharge and redness.   Respiratory: Positive for apnea. Negative for cough, shortness of breath, wheezing and stridor.    Cardiovascular: Negative for chest pain, palpitations and leg swelling.   Gastrointestinal: Negative for abdominal pain, constipation, nausea and vomiting.   Endocrine: Negative for cold intolerance, heat intolerance and polyphagia.   Genitourinary: Negative for dysuria, flank pain, frequency and urgency.   Musculoskeletal: Negative for joint swelling, myalgias, neck pain and neck stiffness.   Skin: Negative for pallor, rash  "and wound.   Allergic/Immunologic: Negative for environmental allergies.   Neurological: Negative for dizziness, tremors, seizures, syncope, facial asymmetry, speech difficulty, weakness, light-headedness, numbness and headaches.   Hematological: Negative for adenopathy.   Psychiatric/Behavioral: Positive for sleep disturbance. Negative for confusion and hallucinations. The patient is not nervous/anxious.        Physical Exam     Vitals:    10/25/17 1600   BP: 136/84   Pulse: 67   SpO2: 97%   Weight: 200 lb (90.7 kg)   Height: 73\" (185.4 cm)     GENERAL: Patient is pleasant, cooperative, appears to be stated age.  Body habitus is endomorphic.  SKIN AND EXTREMITIES:  No skin rashes or lesions are noted.  No cyanosis, clubbing or edema of the extremities.    HEAD:  Head is normocephalic and atraumatic.    NECK: Neck are non-tender without thyromegaly or adenopathy.  Carotic upstrokes are 1+/4.  No cranial or cervical bruits.  The neck is supple with a full range of motion.   ENT: palate elevate symmetrically, no evidence of high arch palate, tongue midline erythema in posterior pharynx, Mallampati Classification Class III   CARDIOVASCULAR:  Regular rate and rhythm with normal S1 and S2 without rub or gallop.  RESPIRATORY:  Clear to auscultation without wheezes or crackle   ABDOMEN:  Soft and non-tender, positive bowel sound without hepatosplenomegaly  BACK:  Back is straight without midline defect.    PSYCH:  Higher cortical function/mental status:  The patient is alert.  She is oriented x3 to time, place and person.  Recent and the remote memory appear normal.  The patient has a good fund of knowledge.  There is no visual or auditory hallucination or suicidal or homicidal ideation.  SPEECH:There is no gross evidence of aphasia, dysarthria or agnosia.      CRANIAL NERVES:  Pupils are 4mm, equal round reactive to light, reacting briskly to 2mm without afferent pupillary defect.  Visual fields are intact to " confrontation testing.  Fundoscopic examination reveals sharp disk margins with normal vasculature.  No papilledema, hemorrhages or exudates.  Extraocular movements are full and smooth with normal pursuits and saccades.  No nystagmus noted.  The face is symmetric. palate elevate symmetrically, Tongue midline, positive gag reflex. The remainder of the cranial nerves are intact and symmetrical.    MOTOR: Strength is 5/5 throughout with normal tone and bulk with the following exceptions, 4/5 intrinsic muscles of the hands and feet.  No involuntary movements noted.    Deep Tendon Reflexes: are 2/4 and symmetrical in the upper extremities, 2/4 and symmetrical at the knees and 1/4 and symmetrical at the Achilles tendon.  Plantar responses were down-going bilaterally.    SENSATION:  Intact to pinprick, light touch, vibration and proprioception.  Coordination:  The patient normally performs finger-nose-finger, heel-to-knee-to-shin and rapid alternating movements in symmetrical fashion.    COORDINATION AND GAIT:  The patient walks with a narrow-based gait.  Patient is able to heel-toe and tandem walk forward and backwards without difficulty.  Romberg and monopedal  Romberg are negative.    MUSCULOSKELETAL: Range of motion normal, no clubbing, cyanosis, or edema.  No joint swelling.            Studies: I have personally reviewed the following and discussed with the patient.  Results for orders placed or performed in visit on 09/06/17   Comprehensive Metabolic Panel   Result Value Ref Range    Glucose 96 74 - 98 mg/dL    BUN 18 7 - 20 mg/dL    Creatinine 1.40 (H) 0.60 - 1.30 mg/dL    eGFR Non African Am 49 (L) >60 mL/min/1.73    eGFR African Am 60 (L) >60 mL/min/1.73    BUN/Creatinine Ratio 12.9 6.3 - 21.9    Sodium 140 137 - 145 mmol/L    Potassium 4.5 3.5 - 5.1 mmol/L    Chloride 106 98 - 107 mmol/L    Total CO2 27.0 26.0 - 30.0 mmol/L    Calcium 9.3 8.4 - 10.2 mg/dL    Total Protein 6.6 6.3 - 8.2 g/dL    Albumin 4.00 3.50 -  5.00 g/dL    Globulin 2.6 gm/dL    A/G Ratio 1.5 1.0 - 2.0 g/dL    Total Bilirubin 0.9 0.2 - 1.3 mg/dL    Alkaline Phosphatase 54 38 - 126 U/L    AST (SGOT) 25 15 - 46 U/L    ALT (SGPT) 26 13 - 69 U/L   Lipid Panel   Result Value Ref Range    Total Cholesterol 112 0 - 199 mg/dL    Triglycerides 83 <150 mg/dL    HDL Cholesterol 45 40 - 60 mg/dL    VLDL Cholesterol 16.6 mg/dL    LDL Cholesterol  50 0 - 99 mg/dL   TSH   Result Value Ref Range    TSH 1.240 0.470 - 4.680 mIU/mL   Hemoglobin A1c   Result Value Ref Range    Hemoglobin A1C 6.80 %   Urinalysis With / Microscopic If Indicated   Result Value Ref Range    Specific Gravity, UA 1.015 1.005 - 1.030    pH, UA 6.0 5.0 - 8.0    Color, UA Yellow     Appearance, UA Clear Clear    Leukocytes, UA Negative Negative    Protein Negative Negative    Glucose, UA Negative Negative    Ketones Negative Negative    Blood, UA Negative Negative    Bilirubin, UA Negative Negative    Urobilinogen, UA Comment     Nitrite, UA Negative Negative   Vitamin D 25 Hydroxy   Result Value Ref Range    25 Hydroxy, Vitamin D 56.9 ng/mL   MicroAlbumin, Urine, Random   Result Value Ref Range    Microalbumin, Urine <3.0 Not Estab. ug/mL   PSA   Result Value Ref Range    PSA <0.064 0.060 - 4.000 ng/mL   CBC & Differential   Result Value Ref Range    WBC 6.45 4.80 - 10.80 10*3/mm3    RBC 4.51 (L) 4.70 - 6.10 10*6/mm3    Hemoglobin 13.8 (L) 14.0 - 18.0 g/dL    Hematocrit 42.0 42.0 - 52.0 %    MCV 93.1 80.0 - 94.0 fL    MCH 30.6 27.0 - 31.0 pg    MCHC 32.9 30.0 - 37.0 g/dL    RDW 13.3 11.5 - 14.5 %    Platelets 135 130 - 400 10*3/mm3    Neutrophil Rel % 55.2 37.0 - 80.0 %    Lymphocyte Rel % 32.4 10.0 - 50.0 %    Monocyte Rel % 8.2 0.0 - 12.0 %    Eosinophil Rel % 3.3 0.0 - 7.0 %    Basophil Rel % 0.6 0.0 - 2.5 %    Neutrophils Absolute 3.56 2.00 - 6.90 10*3/mm3    Lymphocytes Absolute 2.09 0.60 - 3.40 10*3/mm3    Monocytes Absolute 0.53 0.00 - 0.90 10*3/mm3    Eosinophils Absolute 0.21 0.00 - 0.70  10*3/mm3    Basophils Absolute 0.04 0.00 - 0.20 10*3/mm3    Immature Granulocyte Rel % 0.3 0.0 - 0.6 %    Immature Grans Absolute 0.02 0.00 - 0.06 10*3/mm3    nRBC 0.0 0.0 - 0.0 /100 WBC     Records Reviewed: I have personally reviewed his previous medical record.    Tristan was seen today for sleep apnea.    Diagnoses and all orders for this visit:    Obstructive sleep apnea  -     Home Sleep Study; Future    Excessive daytime sleepiness  -     Home Sleep Study; Future    Nocturnal oxygen desaturation  -     Home Sleep Study; Future      Treatments:  1.  We'll schedule this patient to have a home sleep testing  2.  With counseled patient extensively the importance of weight reduction  3.  Counseled patient on good sleep hygiene  4.  Avoid sleep deprivation  5.  Counseled patient on sleep apnea as follow  I have counseled patient extensively on Sleep Hygiene including regular sleep wake schedule and stimulus control therapy.  I have also discussed the importance of weight reduction because 10% reduction in body weight can reduce sleep apnea by 50 %. We have also discussed abstaining from smoking and drinking.  I have explained to patient that obstructive apnea episode is defined as the absence of airflow for at least 10 seconds.  Sleep apnea is usually accompanied by snoring, disturbed sleep, and daytime sleepiness. Patients with micrognathia, retrognathia, enlarged tonsils, tongue enlargement, and acromegaly are especially predisposed to obstructive sleep apnea. Abnormalities or weakness in the muscles can also contribute to obstructive sleep apnea. Obesity can also contribute to sleep apnea.     Sleep apnea can lead to a number of complications, ranging from daytime sleepiness to possible increased risk of cardiovascular risks.   Daytime sleepiness is the most serious.  Daytime sleepiness can also increase the risk for accident-related injuries. Several studies have suggested that people with sleep apnea have two to  three times as many car accidents, and five to seven times the risk for multiple accidents.   A number of cardiovascular diseases -- including high blood pressure, heart failure, stroke, and heart arrhythmias -- have an association with obstructive sleep apnea.   Up to a third of patients with heart failure also have sleep apnea. Both central and obstructive sleep apnea are linked with heart failure. Obstructive sleep apnea is also noted to be associated with type 2 diabetes according to Dr. Nava at [facility].  The best treatment for symptomatic obstructive sleep apnea is continuous positive airflow pressure (CPAP). Bilevel positive airway pressure (BPAP) systems may be particularly helpful for patients with coexisting lung disease and those with excessive levels of carbon dioxide.  Other treatment options including UPPP surgery, LAUP surgery, radiofrequency somnoplasty and dental appliances such Remlap or Clearway.    This Document is signed by Ramon Duran MD, FAAN, FAASM   October 25, 20178:13 PM

## 2017-10-26 LAB
BASOPHILS # BLD AUTO: 0.04 10*3/MM3 (ref 0–0.2)
BASOPHILS NFR BLD AUTO: 0.6 % (ref 0–2.5)
EOSINOPHIL # BLD AUTO: 0.31 10*3/MM3 (ref 0–0.7)
EOSINOPHIL NFR BLD AUTO: 4.6 % (ref 0–7)
ERYTHROCYTE [DISTWIDTH] IN BLOOD BY AUTOMATED COUNT: 13.2 % (ref 11.5–14.5)
HCT VFR BLD AUTO: 40.9 % (ref 42–52)
HGB BLD-MCNC: 13.7 G/DL (ref 14–18)
IMM GRANULOCYTES # BLD: 0.02 10*3/MM3 (ref 0–0.06)
IMM GRANULOCYTES NFR BLD: 0.3 % (ref 0–0.6)
LYMPHOCYTES # BLD AUTO: 2.74 10*3/MM3 (ref 0.6–3.4)
LYMPHOCYTES NFR BLD AUTO: 40.6 % (ref 10–50)
MCH RBC QN AUTO: 31 PG (ref 27–31)
MCHC RBC AUTO-ENTMCNC: 33.5 G/DL (ref 30–37)
MCV RBC AUTO: 92.5 FL (ref 80–94)
MONOCYTES # BLD AUTO: 0.55 10*3/MM3 (ref 0–0.9)
MONOCYTES NFR BLD AUTO: 8.1 % (ref 0–12)
NEUTROPHILS # BLD AUTO: 3.09 10*3/MM3 (ref 2–6.9)
NEUTROPHILS NFR BLD AUTO: 45.8 % (ref 37–80)
NRBC BLD AUTO-RTO: 0 /100 WBC (ref 0–0)
PLATELET # BLD AUTO: 135 10*3/MM3 (ref 130–400)
RBC # BLD AUTO: 4.42 10*6/MM3 (ref 4.7–6.1)
WBC # BLD AUTO: 6.75 10*3/MM3 (ref 4.8–10.8)

## 2017-10-27 ENCOUNTER — APPOINTMENT (OUTPATIENT)
Dept: CARDIAC REHAB | Facility: HOSPITAL | Age: 77
End: 2017-10-27

## 2017-10-30 ENCOUNTER — APPOINTMENT (OUTPATIENT)
Dept: CARDIAC REHAB | Facility: HOSPITAL | Age: 77
End: 2017-10-30

## 2017-10-30 RX ORDER — INSULIN ASPART 100 [IU]/ML
INJECTION, SOLUTION INTRAVENOUS; SUBCUTANEOUS
Qty: 15 ML | Refills: 2 | Status: SHIPPED | OUTPATIENT
Start: 2017-10-30 | End: 2019-02-19 | Stop reason: SDUPTHER

## 2017-11-01 ENCOUNTER — OFFICE VISIT (OUTPATIENT)
Dept: CARDIAC REHAB | Facility: HOSPITAL | Age: 77
End: 2017-11-01

## 2017-11-01 DIAGNOSIS — Z95.1 HX OF CORONARY ARTERY BYPASS GRAFT: Primary | ICD-10-CM

## 2017-11-01 RX ORDER — GABAPENTIN 400 MG/1
400 CAPSULE ORAL 3 TIMES DAILY
Qty: 270 CAPSULE | Refills: 2 | Status: SHIPPED | OUTPATIENT
Start: 2017-11-01 | End: 2018-07-25 | Stop reason: SDUPTHER

## 2017-11-03 ENCOUNTER — OFFICE VISIT (OUTPATIENT)
Dept: CARDIAC REHAB | Facility: HOSPITAL | Age: 77
End: 2017-11-03

## 2017-11-03 DIAGNOSIS — Z95.1 HX OF CORONARY ARTERY BYPASS GRAFT: Primary | ICD-10-CM

## 2017-11-06 ENCOUNTER — APPOINTMENT (OUTPATIENT)
Dept: CARDIAC REHAB | Facility: HOSPITAL | Age: 77
End: 2017-11-06

## 2017-11-06 ENCOUNTER — HOSPITAL ENCOUNTER (OUTPATIENT)
Dept: SLEEP MEDICINE | Facility: HOSPITAL | Age: 77
Setting detail: THERAPIES SERIES
End: 2017-11-06
Attending: PSYCHIATRY & NEUROLOGY

## 2017-11-06 DIAGNOSIS — G47.19 EXCESSIVE DAYTIME SLEEPINESS: ICD-10-CM

## 2017-11-06 DIAGNOSIS — G47.33 OBSTRUCTIVE SLEEP APNEA: ICD-10-CM

## 2017-11-06 DIAGNOSIS — G47.34 NOCTURNAL OXYGEN DESATURATION: ICD-10-CM

## 2017-11-06 PROCEDURE — 95806 SLEEP STUDY UNATT&RESP EFFT: CPT

## 2017-11-06 PROCEDURE — 95806 SLEEP STUDY UNATT&RESP EFFT: CPT | Performed by: PSYCHIATRY & NEUROLOGY

## 2017-11-07 ENCOUNTER — OFFICE VISIT (OUTPATIENT)
Dept: INTERNAL MEDICINE | Facility: CLINIC | Age: 77
End: 2017-11-07

## 2017-11-07 VITALS
DIASTOLIC BLOOD PRESSURE: 70 MMHG | TEMPERATURE: 99.2 F | RESPIRATION RATE: 14 BRPM | HEART RATE: 68 BPM | SYSTOLIC BLOOD PRESSURE: 130 MMHG | BODY MASS INDEX: 26.51 KG/M2 | WEIGHT: 200 LBS | OXYGEN SATURATION: 97 % | HEIGHT: 73 IN

## 2017-11-07 DIAGNOSIS — K52.9 COLITIS: ICD-10-CM

## 2017-11-07 DIAGNOSIS — R10.9 ABDOMINAL PAIN, UNSPECIFIED ABDOMINAL LOCATION: ICD-10-CM

## 2017-11-07 DIAGNOSIS — T78.40XA ALLERGIC REACTION TO DRUG, INITIAL ENCOUNTER: Primary | ICD-10-CM

## 2017-11-07 PROCEDURE — 99214 OFFICE O/P EST MOD 30 MIN: CPT | Performed by: INTERNAL MEDICINE

## 2017-11-07 RX ORDER — DICYCLOMINE HCL 20 MG
TABLET ORAL
Refills: 0 | COMMUNITY
Start: 2017-11-06 | End: 2019-02-05

## 2017-11-07 RX ORDER — METRONIDAZOLE 500 MG/1
TABLET ORAL
Refills: 0 | COMMUNITY
Start: 2017-11-06 | End: 2018-01-05

## 2017-11-07 RX ORDER — CIPROFLOXACIN 500 MG/1
TABLET, FILM COATED ORAL
Refills: 0 | COMMUNITY
Start: 2017-11-06 | End: 2018-01-05

## 2017-11-07 RX ORDER — HYDROCHLOROTHIAZIDE 12.5 MG/1
TABLET ORAL
Refills: 3 | COMMUNITY
Start: 2017-10-27 | End: 2018-01-05

## 2017-11-07 NOTE — PROGRESS NOTES
Subjective   Tristan Haines is a 77 y.o. male.     Chief Complaint   Patient presents with   • Facial Swelling     was eating out and noticed that his lips were going numb and is now the whole moth and throat is going numb    • Dizziness       History of Present Illness   Patient complains while eating lunch developed numbness around her lips and numbness at the tip of the tongue and also discomfort in the throat patient described as swelling in the throat denies any short of breath.  Patient states symptoms are improving already.  Patient started to take Cipro and metronidazole on Sunday due to abdominal pain diagnosed diverticulitis in the emergency room in [facility] in [place].  Patient denies any nausea vomiting no fever chills no appetite changes.  Patient still has some epigastric area discomfort in the right abdomen tenderness.  Pain is improved after antibiotics.  Patient also complains constipation recently patient is a using Dulcolax.  Patient states that abdominal pain helps after bowel movement.patient states a CT scan in the hospital showed diverticulitis.  Patient does have a history of a bowel inflammation status post bowel resection1 m.  Last colonoscopy in June 2017 showed ileitis.  And the pseudomembrane.    Current Outpatient Prescriptions:   •  acetaminophen-codeine (TYLENOL #3) 300-30 MG per tablet, TK 1 T PO Q 4 H PRN FOR PAIN, Disp: , Rfl: 0  •  aspirin 81 MG tablet, Take  by mouth., Disp: , Rfl:   •  atorvastatin (LIPITOR) 40 MG tablet, TAKE 1 TABLET BY MOUTH ONE TIME A DAY  (Patient taking differently: TAKE 1 TABLET BY MOUTH ONE HS), Disp: 30 tablet, Rfl: 0  •  BD PEN NEEDLE EDDI U/F 32G X 4 MM misc, use to inject insulin twice daily as directed , Disp: 100 each, Rfl: 11  •  carvedilol (COREG) 25 MG tablet, TAKE 1 TABLET BY MOUTH TWO TIMES A DAY AS DIRECTED , Disp: 180 tablet, Rfl: 1  •  Cholecalciferol (VITAMIN D3) 2000 UNITS tablet, Take  by mouth., Disp: , Rfl:   •  ciprofloxacin  (CIPRO) 500 MG tablet, TAKE 1 TABLET BY MOUTH TWO TIMES A DAY UNTIL GONE, Disp: , Rfl: 0  •  CloNIDine (CATAPRES) 0.1 MG tablet, TAKE 1 TABLET BY MOUTH EVERY EIGHT HOURS as needed if blood pressure >160/90 mmHg, Disp: , Rfl: 0  •  diclofenac (VOLTAREN) 1 % gel gel, Apply 4 g topically 4 (Four) Times a Day As Needed (Pain)., Disp: 1 tube, Rfl: 3  •  dicyclomine (BENTYL) 20 MG tablet, TAKE 1 TABLET BY MOUTH EVERY EIGHT HOURS AS NEEDED FOR PAIN, Disp: , Rfl: 0  •  gabapentin (NEURONTIN) 400 MG capsule, Take 1 capsule by mouth 3 (Three) Times a Day., Disp: 270 capsule, Rfl: 2  •  Glucose Blood (ONETOUCH ULTRA BLUE VI), OneTouch Ultra Blue In Vitro Strip; Patient Sig: OneTouch Ultra Blue In Vitro Strip TEST BLOOS SUGAR 3 TIMES DAILY; 100; 4; 09-Jan-2015; Active, Disp: , Rfl:   •  hydrochlorothiazide (HYDRODIURIL) 12.5 MG tablet, TAKE 1 TABLET BY MOUTH EVERY OTHER DAY, Disp: , Rfl: 3  •  lansoprazole (PREVACID) 30 MG capsule, Take 30 mg by mouth As Needed., Disp: , Rfl:   •  losartan (COZAAR) 100 MG tablet, Take 1 tablet by mouth Daily., Disp: 90 tablet, Rfl: 3  •  losartan (COZAAR) 50 MG tablet, Take 1 tablet by mouth Daily. (Patient taking differently: Take 50 mg by mouth Daily. Takes on Sun, Tues, Thurs, Sat. and Sunday), Disp: 90 tablet, Rfl: 3  •  losartan-hydrochlorothiazide (HYZAAR) 50-12.5 MG per tablet, Take 1 tablet by mouth 3 (Three) Times a Week., Disp: , Rfl:   •  methocarbamol (ROBAXIN) 500 MG tablet, Take 1 tablet by mouth 4 (Four) Times a Day., Disp: 60 tablet, Rfl: 1  •  metroNIDAZOLE (FLAGYL) 500 MG tablet, TAKE 1 TABLET BY MOUTH TWO TIMES A DAY UNTIL GONE, Disp: , Rfl: 0  •  NOVOLOG FLEXPEN 100 UNIT/ML solution pen-injector sc pen, inject 5 units under the skin 2 times per day before meals, Disp: 15 mL, Rfl: 2  •  ONE TOUCH ULTRA TEST test strip, TEST BLOOS SUGAR 3 TIMES DAILY, Disp: 100 each, Rfl: 4  •  Probiotic Product (PROBIOTIC DAILY PO), Take  by mouth., Disp: , Rfl:   •  TOUJEO SOLOSTAR 300  UNIT/ML solution pen-injector, inject 25 units subcutaneously two times a day (Patient taking differently: inject 24 units subcutaneously two times a day), Disp: 4.5 mL, Rfl: 5  •  traZODone (DESYREL) 50 MG tablet, TAKE 1 TABLET BY MOUTH AT BEDTIME  (Patient taking differently: TAKE 1 TABLET BY MOUTH AT BEDTIME PRN), Disp: 30 tablet, Rfl: 3    The following portions of the patient's history were reviewed and updated as appropriate: allergies, current medications, past family history, past medical history, past social history, past surgical history and problem list.    Review of Systems   Constitutional: Negative.    HENT:        Lips numbness sore throat swelling sensation   Respiratory: Negative.    Cardiovascular: Negative.    Gastrointestinal: Positive for abdominal pain.   Musculoskeletal: Negative.    Skin: Negative.    Neurological: Negative.    Psychiatric/Behavioral: Negative.        Objective   Physical Exam   Constitutional: He is oriented to person, place, and time. He appears well-developed and well-nourished.   HENT:   Head: Normocephalic.   Left Ear: External ear normal.   Nose: Nose normal.   Mouth/Throat: Oropharynx is clear and moist.   Eyes: Conjunctivae are normal. Pupils are equal, round, and reactive to light.   Neck: Normal range of motion. Neck supple.   Cardiovascular: Normal rate, regular rhythm and normal heart sounds.    Pulmonary/Chest: Effort normal and breath sounds normal.   Abdominal: Bowel sounds are normal. He exhibits no mass. There is tenderness (right abdominaltenderness no rebound no rigidity bowel sounds present..). There is no rebound and no guarding. No hernia.   Neurological: He is alert and oriented to person, place, and time.   Skin: Skin is warm.   Psychiatric: He has a normal mood and affect.       All tests have been reviewed.    Assessment/Plan   Diagnoses and all orders for this visit:    Allergic reaction to drug, initial encounterold antibiotics metronidazole and  ciprofloxacin.  Obtain CT report and the blood test report.  If signs of infection we will start the vancomycin    Abdominal pain, unspecified abdominal location probably due to diverticulitis need the CT report.  All may be related to ileitis or constipation.  Referred to gastroenterologist  -     Ambulatory Referral to Gastroenterology    Other orders  -     metroNIDAZOLE (FLAGYL) 500 MG tablet; TAKE 1 TABLET BY MOUTH TWO TIMES A DAY UNTIL GONE  -     ciprofloxacin (CIPRO) 500 MG tablet; TAKE 1 TABLET BY MOUTH TWO TIMES A DAY UNTIL GONE  -     hydrochlorothiazide (HYDRODIURIL) 12.5 MG tablet; TAKE 1 TABLET BY MOUTH EVERY OTHER DAY  -     dicyclomine (BENTYL) 20 MG tablet; TAKE 1 TABLET BY MOUTH EVERY EIGHT HOURS AS NEEDED FOR PAIN           to the emergency room if short of breath or abdominal pain worse  Trial of mesalamine 800 tid see GI ASAP, numb tingling and throat swelling much improved 10/8/17

## 2017-11-08 ENCOUNTER — OFFICE VISIT (OUTPATIENT)
Dept: CARDIAC REHAB | Facility: HOSPITAL | Age: 77
End: 2017-11-08

## 2017-11-08 DIAGNOSIS — Z95.1 HX OF CORONARY ARTERY BYPASS GRAFT: Primary | ICD-10-CM

## 2017-11-08 RX ORDER — MESALAMINE 800 MG/1
800 TABLET, DELAYED RELEASE ORAL 3 TIMES DAILY
Qty: 21 TABLET | Refills: 0 | Status: SHIPPED | OUTPATIENT
Start: 2017-11-08 | End: 2017-11-10 | Stop reason: SDUPTHER

## 2017-11-10 ENCOUNTER — OFFICE VISIT (OUTPATIENT)
Dept: CARDIAC REHAB | Facility: HOSPITAL | Age: 77
End: 2017-11-10

## 2017-11-10 DIAGNOSIS — Z95.1 HX OF CORONARY ARTERY BYPASS GRAFT: Primary | ICD-10-CM

## 2017-11-10 RX ORDER — MESALAMINE 800 MG/1
TABLET, DELAYED RELEASE ORAL
Qty: 21 TABLET | Refills: 0 | Status: SHIPPED | OUTPATIENT
Start: 2017-11-10 | End: 2017-11-15 | Stop reason: SDUPTHER

## 2017-11-13 ENCOUNTER — APPOINTMENT (OUTPATIENT)
Dept: CARDIAC REHAB | Facility: HOSPITAL | Age: 77
End: 2017-11-13

## 2017-11-15 ENCOUNTER — OFFICE VISIT (OUTPATIENT)
Dept: CARDIAC REHAB | Facility: HOSPITAL | Age: 77
End: 2017-11-15

## 2017-11-15 DIAGNOSIS — Z95.1 HX OF CORONARY ARTERY BYPASS GRAFT: Primary | ICD-10-CM

## 2017-11-15 RX ORDER — MESALAMINE 800 MG/1
TABLET, DELAYED RELEASE ORAL
Qty: 21 TABLET | Refills: 0 | Status: SHIPPED | OUTPATIENT
Start: 2017-11-15 | End: 2018-01-05

## 2017-11-17 ENCOUNTER — APPOINTMENT (OUTPATIENT)
Dept: CARDIAC REHAB | Facility: HOSPITAL | Age: 77
End: 2017-11-17

## 2017-11-20 ENCOUNTER — OFFICE VISIT (OUTPATIENT)
Dept: CARDIAC REHAB | Facility: HOSPITAL | Age: 77
End: 2017-11-20

## 2017-11-20 DIAGNOSIS — Z95.1 HX OF CORONARY ARTERY BYPASS GRAFT: Primary | ICD-10-CM

## 2017-11-22 ENCOUNTER — OFFICE VISIT (OUTPATIENT)
Dept: CARDIAC REHAB | Facility: HOSPITAL | Age: 77
End: 2017-11-22

## 2017-11-22 DIAGNOSIS — Z95.1 HX OF CORONARY ARTERY BYPASS GRAFT: Primary | ICD-10-CM

## 2017-11-24 ENCOUNTER — APPOINTMENT (OUTPATIENT)
Dept: CARDIAC REHAB | Facility: HOSPITAL | Age: 77
End: 2017-11-24

## 2017-11-27 ENCOUNTER — APPOINTMENT (OUTPATIENT)
Dept: CARDIAC REHAB | Facility: HOSPITAL | Age: 77
End: 2017-11-27

## 2017-11-27 RX ORDER — LANSOPRAZOLE 30 MG/1
CAPSULE, DELAYED RELEASE ORAL
Qty: 90 CAPSULE | Refills: 2 | Status: SHIPPED | OUTPATIENT
Start: 2017-11-27 | End: 2018-02-08 | Stop reason: SDUPTHER

## 2017-11-29 ENCOUNTER — APPOINTMENT (OUTPATIENT)
Dept: CARDIAC REHAB | Facility: HOSPITAL | Age: 77
End: 2017-11-29

## 2017-12-01 ENCOUNTER — OFFICE VISIT (OUTPATIENT)
Dept: CARDIAC REHAB | Facility: HOSPITAL | Age: 77
End: 2017-12-01

## 2017-12-01 DIAGNOSIS — Z95.1 HX OF CORONARY ARTERY BYPASS GRAFT: Primary | ICD-10-CM

## 2017-12-04 ENCOUNTER — APPOINTMENT (OUTPATIENT)
Dept: CARDIAC REHAB | Facility: HOSPITAL | Age: 77
End: 2017-12-04

## 2017-12-05 NOTE — TELEPHONE ENCOUNTER
PATIENT REQUESTING REFILL OF PEN NEEDLES AND TEST STRIPS. PATIENT REQUEST EXTRA TO BE SENT IN, HE STATED HE ALWAYS RUNS OUT.

## 2017-12-06 ENCOUNTER — PRIOR AUTHORIZATION (OUTPATIENT)
Dept: INTERNAL MEDICINE | Facility: CLINIC | Age: 77
End: 2017-12-06

## 2017-12-06 ENCOUNTER — APPOINTMENT (OUTPATIENT)
Dept: CARDIAC REHAB | Facility: HOSPITAL | Age: 77
End: 2017-12-06

## 2017-12-08 ENCOUNTER — APPOINTMENT (OUTPATIENT)
Dept: CARDIAC REHAB | Facility: HOSPITAL | Age: 77
End: 2017-12-08

## 2017-12-11 ENCOUNTER — OFFICE VISIT (OUTPATIENT)
Dept: CARDIAC REHAB | Facility: HOSPITAL | Age: 77
End: 2017-12-11

## 2017-12-11 DIAGNOSIS — Z95.1 HX OF CORONARY ARTERY BYPASS GRAFT: Primary | ICD-10-CM

## 2017-12-13 ENCOUNTER — OFFICE VISIT (OUTPATIENT)
Dept: CARDIAC REHAB | Facility: HOSPITAL | Age: 77
End: 2017-12-13

## 2017-12-13 DIAGNOSIS — Z95.1 HX OF CORONARY ARTERY BYPASS GRAFT: Primary | ICD-10-CM

## 2017-12-14 RX ORDER — CARVEDILOL 25 MG/1
TABLET ORAL
Qty: 180 TABLET | Refills: 0 | Status: SHIPPED | OUTPATIENT
Start: 2017-12-14 | End: 2018-09-14 | Stop reason: SDUPTHER

## 2017-12-15 ENCOUNTER — OFFICE VISIT (OUTPATIENT)
Dept: CARDIAC REHAB | Facility: HOSPITAL | Age: 77
End: 2017-12-15

## 2017-12-15 DIAGNOSIS — Z95.1 HX OF CORONARY ARTERY BYPASS GRAFT: Primary | ICD-10-CM

## 2017-12-18 ENCOUNTER — OFFICE VISIT (OUTPATIENT)
Dept: CARDIAC REHAB | Facility: HOSPITAL | Age: 77
End: 2017-12-18

## 2017-12-18 DIAGNOSIS — Z95.1 HX OF CORONARY ARTERY BYPASS GRAFT: Primary | ICD-10-CM

## 2017-12-20 ENCOUNTER — APPOINTMENT (OUTPATIENT)
Dept: CARDIAC REHAB | Facility: HOSPITAL | Age: 77
End: 2017-12-20

## 2017-12-22 ENCOUNTER — OFFICE VISIT (OUTPATIENT)
Dept: CARDIAC REHAB | Facility: HOSPITAL | Age: 77
End: 2017-12-22

## 2017-12-22 DIAGNOSIS — Z95.1 HX OF CORONARY ARTERY BYPASS GRAFT: Primary | ICD-10-CM

## 2017-12-22 RX ORDER — INSULIN GLARGINE 300 U/ML
INJECTION, SOLUTION SUBCUTANEOUS
Qty: 4.5 ML | Refills: 4 | Status: SHIPPED | OUTPATIENT
Start: 2017-12-22 | End: 2018-08-07 | Stop reason: SDUPTHER

## 2017-12-25 ENCOUNTER — APPOINTMENT (OUTPATIENT)
Dept: CARDIAC REHAB | Facility: HOSPITAL | Age: 77
End: 2017-12-25

## 2017-12-27 ENCOUNTER — APPOINTMENT (OUTPATIENT)
Dept: CARDIAC REHAB | Facility: HOSPITAL | Age: 77
End: 2017-12-27

## 2017-12-29 ENCOUNTER — APPOINTMENT (OUTPATIENT)
Dept: CARDIAC REHAB | Facility: HOSPITAL | Age: 77
End: 2017-12-29

## 2018-01-03 ENCOUNTER — APPOINTMENT (OUTPATIENT)
Dept: CARDIAC REHAB | Facility: HOSPITAL | Age: 78
End: 2018-01-03

## 2018-01-05 ENCOUNTER — OFFICE VISIT (OUTPATIENT)
Dept: INTERNAL MEDICINE | Facility: CLINIC | Age: 78
End: 2018-01-05

## 2018-01-05 ENCOUNTER — APPOINTMENT (OUTPATIENT)
Dept: CARDIAC REHAB | Facility: HOSPITAL | Age: 78
End: 2018-01-05

## 2018-01-05 VITALS
BODY MASS INDEX: 26.77 KG/M2 | RESPIRATION RATE: 14 BRPM | HEART RATE: 76 BPM | TEMPERATURE: 97.5 F | HEIGHT: 73 IN | OXYGEN SATURATION: 95 % | DIASTOLIC BLOOD PRESSURE: 78 MMHG | SYSTOLIC BLOOD PRESSURE: 120 MMHG | WEIGHT: 202 LBS

## 2018-01-05 DIAGNOSIS — I25.119 CORONARY ARTERY DISEASE INVOLVING NATIVE CORONARY ARTERY OF NATIVE HEART WITH ANGINA PECTORIS (HCC): Chronic | ICD-10-CM

## 2018-01-05 DIAGNOSIS — I10 ESSENTIAL HYPERTENSION: ICD-10-CM

## 2018-01-05 DIAGNOSIS — M54.10 RADICULOPATHY, UNSPECIFIED SPINAL REGION: ICD-10-CM

## 2018-01-05 DIAGNOSIS — R79.9 ABNORMAL FINDING OF BLOOD CHEMISTRY: ICD-10-CM

## 2018-01-05 DIAGNOSIS — E11.8 TYPE 2 DIABETES MELLITUS WITH COMPLICATION, WITHOUT LONG-TERM CURRENT USE OF INSULIN (HCC): Primary | ICD-10-CM

## 2018-01-05 PROBLEM — E11.9 DM (DIABETES MELLITUS): Status: RESOLVED | Noted: 2017-10-12 | Resolved: 2018-01-05

## 2018-01-05 PROCEDURE — 99214 OFFICE O/P EST MOD 30 MIN: CPT | Performed by: INTERNAL MEDICINE

## 2018-01-05 RX ORDER — SPIRONOLACTONE 25 MG/1
TABLET ORAL
Refills: 1 | COMMUNITY
Start: 2017-12-28 | End: 2018-02-27 | Stop reason: SDUPTHER

## 2018-01-05 NOTE — PROGRESS NOTES
Transitional Care Follow Up Visit  Subjective     Tristan Haines is a 77 y.o. male who presents for a transitional care management visit.    Within 48 business hours after discharge our office contacted him via telephone to coordinate his care and needs.      I reviewed and discussed the details of that call along with the discharge summary, hospital problems, inpatient lab results, inpatient diagnostic studies, and consultation reports with Tristan.     Current outpatient and discharge medications have been reconciled for the patient.    No flowsheet data found.  Risk for Readmission (LACE) No Data Recorded    History of Present Illness   Course During Hospital Stay:  Patient here for hosp follow up, chest pain and HBP at [facility] critical care . BP med adjusted and aldactone added. Patient is better now. occa upper midback pain NTG x3 helps. Also DM low sugar per patient lantus cut down in the hospital to 20 bid, also neck radiculopathy.improved after shots. normal BP now, constipation now     The following portions of the patient's history were reviewed and updated as appropriate: allergies, current medications, past family history, past medical history, past social history, past surgical history and problem list.    Review of Systems   Constitutional: Negative.    Respiratory: Negative.    Cardiovascular: Negative.    Gastrointestinal: Negative.    Musculoskeletal: Negative.    Skin: Negative.    Neurological: Negative.    Psychiatric/Behavioral: Negative.        Objective   Physical Exam   Constitutional: He is oriented to person, place, and time. He appears well-nourished.   Neck: Neck supple.   Cardiovascular: Normal rate, regular rhythm and normal heart sounds.    Pulmonary/Chest: Effort normal and breath sounds normal.   Abdominal: Bowel sounds are normal.   Neurological: He is alert and oriented to person, place, and time.   Skin: Skin is warm.   Psychiatric: He has a normal mood and affect.       Assessment/Plan    Tristan was seen today for follow-up and labs only.    Diagnoses and all orders for this visit:    Type 2 diabetes mellitus toujeo cut down by cardio from 24 to 20 bid    Coronary artery disease involving native coronary artery of native heart with angina pectoris patient to follow up cardio, do stress test    Essential hypertension continue med    Radiculopathy, cervical  S/p shots and doing better,    Constipation , fiber probiotic, no diarrhea         1 mo with other after labs

## 2018-01-08 ENCOUNTER — APPOINTMENT (OUTPATIENT)
Dept: CARDIAC REHAB | Facility: HOSPITAL | Age: 78
End: 2018-01-08

## 2018-01-10 ENCOUNTER — APPOINTMENT (OUTPATIENT)
Dept: CARDIAC REHAB | Facility: HOSPITAL | Age: 78
End: 2018-01-10

## 2018-01-11 ENCOUNTER — OFFICE VISIT (OUTPATIENT)
Dept: NEUROLOGY | Facility: CLINIC | Age: 78
End: 2018-01-11

## 2018-01-11 VITALS
HEIGHT: 73 IN | HEART RATE: 84 BPM | WEIGHT: 202 LBS | OXYGEN SATURATION: 100 % | BODY MASS INDEX: 26.77 KG/M2 | DIASTOLIC BLOOD PRESSURE: 70 MMHG | SYSTOLIC BLOOD PRESSURE: 126 MMHG

## 2018-01-11 DIAGNOSIS — R06.83 PRIMARY SNORING: Primary | ICD-10-CM

## 2018-01-11 PROCEDURE — 99213 OFFICE O/P EST LOW 20 MIN: CPT | Performed by: PSYCHIATRY & NEUROLOGY

## 2018-01-11 NOTE — PROGRESS NOTES
[facility] NEUROLOGY [place] PROGRESS NOTE  History of Present Illness     Date: 1/11/2018    Patient Identification  Tristan Haines is a 78 y.o. male.    Patient information was obtained from patient.  History/Exam limitations: none.    Original consultation requested by: Cayden Chiang MD      Chief Complaint   Results (Pt in office today to review results of sleep study )      History of Present Illness   Patient is a delightful 78-year-old gentleman referred to [facility] neurology [place] for evaluation of obstructive sleep apnea.  Interval history since last visit, patient underwent nocturnal polysomnography study there is no clinical significant amount of obstructive sleep apnea or desaturation.  Snoring is audible during the recording.      Counseled patient extensively today with regard to sleep hygiene, positional therapy, weight reduction.  And elevating head of bed.      PMH:   Past Medical History:   Diagnosis Date   • Abdominal pain     Hx of.   • Atypical chest pain     Hx of.   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 positive    • Vitamin D deficiency        Past Surgical History:   Past Surgical History:   Procedure Laterality Date   • COLONOSCOPY      2017-[facility]   • CORONARY ARTERY BYPASS GRAFT     • INGUINAL HERNIA REPAIR  1989    Hiatal hernia   • PROSTATE SURGERY      Prostate cancer s/p removal do PSA   • SMALL INTESTINE SURGERY         Family Hisotry:   Family History   Problem Relation Age of Onset   • Cancer Mother    • Diabetes Father    • Hypertension Father    • Liver disease Father    • Cancer Sister    • Diabetes Brother    • Heart attack Other      grandparent   • Arthritis Other    •  Heart attack Other        Social History:   Social History     Social History   • Marital status:      Spouse name: N/A   • Number of children: N/A   • Years of education: N/A     Occupational History   • Not on file.     Social History Main Topics   • Smoking status: Never Smoker   • Smokeless tobacco: Not on file   • Alcohol use No   • Drug use: No   • Sexual activity: Defer     Other Topics Concern   • Not on file     Social History Narrative       Medications:   Current Outpatient Prescriptions   Medication Sig Dispense Refill   • aspirin 81 MG tablet Take  by mouth.     • atorvastatin (LIPITOR) 40 MG tablet TAKE 1 TABLET BY MOUTH ONE TIME A DAY  (Patient taking differently: TAKE 1 TABLET BY MOUTH ONE HS) 30 tablet 0   • carvedilol (COREG) 25 MG tablet TAKE 1 TABLET BY MOUTH TWO TIMES A DAY  180 tablet 0   • Cholecalciferol (VITAMIN D3) 2000 UNITS tablet Take  by mouth.     • dicyclomine (BENTYL) 20 MG tablet TAKE 1 TABLET BY MOUTH EVERY EIGHT HOURS AS NEEDED FOR PAIN  0   • gabapentin (NEURONTIN) 400 MG capsule Take 1 capsule by mouth 3 (Three) Times a Day. 270 capsule 2   • glucose blood (ONE TOUCH ULTRA TEST) test strip Use as instructed to test blood sugar 150 each 4   • Insulin Pen Needle (BD PEN NEEDLE EDDI U/F) 32G X 4 MM misc PATIENT TESTING 3-4 TIMES A DAY. 150 each 11   • lansoprazole (PREVACID) 30 MG capsule TAKE 1 CAPSULE BY MOUTH ONE TIME A DAY  90 capsule 2   • losartan (COZAAR) 50 MG tablet Take 1 tablet by mouth Daily. (Patient taking differently: Take 50 mg by mouth Daily. Takes on Sun, Tues, Thurs, Sat. and Sunday) 90 tablet 3   • NOVOLOG FLEXPEN 100 UNIT/ML solution pen-injector sc pen inject 5 units under the skin 2 times per day before meals 15 mL 2   • Probiotic Product (PROBIOTIC DAILY PO) Take  by mouth.     • spironolactone (ALDACTONE) 25 MG tablet TAKE 1/4 TABLET BY MOUTH two times a day  1   • TOUJEO SOLOSTAR 300 UNIT/ML solution pen-injector INJECT 25 UNITS SUB-Q TWICE DAILY  "AS DIRECTED  4.5 mL 4   • traZODone (DESYREL) 50 MG tablet TAKE 1 TABLET BY MOUTH AT BEDTIME  (Patient taking differently: TAKE 1 TABLET BY MOUTH AT BEDTIME PRN) 30 tablet 3     No current facility-administered medications for this visit.        Allergy:   Allergies   Allergen Reactions   • Ciprofloxacin    • Mesalamine    • Metronidazole        Review of Systems:  Review of Systems   Constitutional: Positive for fatigue. Negative for chills and fever.   HENT: Negative for congestion, ear pain, hearing loss, rhinorrhea and sore throat.    Eyes: Negative for pain, discharge and redness.   Respiratory: Positive for apnea. Negative for cough, shortness of breath, wheezing and stridor.    Cardiovascular: Negative for chest pain, palpitations and leg swelling.   Gastrointestinal: Negative for abdominal pain, constipation, nausea and vomiting.   Endocrine: Negative for cold intolerance, heat intolerance and polyphagia.   Genitourinary: Negative for dysuria, flank pain, frequency and urgency.   Musculoskeletal: Negative for joint swelling, myalgias, neck pain and neck stiffness.   Skin: Negative for pallor, rash and wound.   Allergic/Immunologic: Negative for environmental allergies.   Neurological: Negative for dizziness, tremors, seizures, syncope, facial asymmetry, speech difficulty, weakness, light-headedness, numbness and headaches.   Hematological: Negative for adenopathy.   Psychiatric/Behavioral: Positive for sleep disturbance. Negative for confusion and hallucinations. The patient is not nervous/anxious.        Physical Exam     Vitals:    01/11/18 1041   BP: 126/70   Pulse: 84   SpO2: 100%   Weight: 91.6 kg (202 lb)   Height: 185.4 cm (73\")     GENERAL: Patient is pleasant, cooperative, appears to be stated age.  Body habitus is endomorphic.  SKIN AND EXTREMITIES:  No skin rashes or lesions are noted.  No cyanosis, clubbing or edema of the extremities.    HEAD:  Head is normocephalic and atraumatic.    NECK: Neck " are non-tender without thyromegaly or adenopathy.  Carotic upstrokes are 1+/4.  No cranial or cervical bruits.  The neck is supple with a full range of motion.   ENT: palate elevate symmetrically, no evidence of high arch palate, tongue midline erythema in posterior pharynx, Mallampati Classification Class III   CARDIOVASCULAR:  Regular rate and rhythm with normal S1 and S2 without rub or gallop.  RESPIRATORY:  Clear to auscultation without wheezes or crackle   ABDOMEN:  Soft and non-tender, positive bowel sound without hepatosplenomegaly  BACK:  Back is straight without midline defect.    PSYCH:  Higher cortical function/mental status:  The patient is alert.  She is oriented x3 to time, place and person.  Recent and the remote memory appear normal.  The patient has a good fund of knowledge.  There is no visual or auditory hallucination or suicidal or homicidal ideation.  SPEECH:There is no gross evidence of aphasia, dysarthria or agnosia.      CRANIAL NERVES:  Pupils are 4mm, equal round reactive to light, reacting briskly to 2mm without afferent pupillary defect.  Visual fields are intact to confrontation testing.  Fundoscopic examination reveals sharp disk margins with normal vasculature.  No papilledema, hemorrhages or exudates.  Extraocular movements are full and smooth with normal pursuits and saccades.  No nystagmus noted.  The face is symmetric. palate elevate symmetrically, Tongue midline, positive gag reflex. The remainder of the cranial nerves are intact and symmetrical.    MOTOR: Strength is 5/5 throughout with normal tone and bulk with the following exceptions, 4/5 intrinsic muscles of the hands and feet.  No involuntary movements noted.    Deep Tendon Reflexes: are 2/4 and symmetrical in the upper extremities, 2/4 and symmetrical at the knees and 1/4 and symmetrical at the Achilles tendon.  Plantar responses were down-going bilaterally.    SENSATION:  Intact to pinprick, light touch, vibration and  proprioception.  Coordination:  The patient normally performs finger-nose-finger, heel-to-knee-to-shin and rapid alternating movements in symmetrical fashion.    COORDINATION AND GAIT:  The patient walks with a narrow-based gait.  Patient is able to heel-toe and tandem walk forward and backwards without difficulty.  Romberg and monopedal  Romberg are negative.    MUSCULOSKELETAL: Range of motion normal, no clubbing, cyanosis, or edema.  No joint swelling.              Records Reviewed: I have personally reviewed his previous medical record.    Tristan was seen today for results.    Diagnoses and all orders for this visit:    Primary snoring      Treatments:  1.  Counseled patient extensively today with regard to sleep hygiene, positional therapy, weight reduction.  And elevating head of bed.      This Document is signed by Ramon Duran MD, FAAN, FAASM   January 11, 20184:11 PM

## 2018-01-12 ENCOUNTER — APPOINTMENT (OUTPATIENT)
Dept: CARDIAC REHAB | Facility: HOSPITAL | Age: 78
End: 2018-01-12

## 2018-01-15 ENCOUNTER — APPOINTMENT (OUTPATIENT)
Dept: CARDIAC REHAB | Facility: HOSPITAL | Age: 78
End: 2018-01-15

## 2018-01-17 ENCOUNTER — APPOINTMENT (OUTPATIENT)
Dept: CARDIAC REHAB | Facility: HOSPITAL | Age: 78
End: 2018-01-17

## 2018-01-19 ENCOUNTER — APPOINTMENT (OUTPATIENT)
Dept: CARDIAC REHAB | Facility: HOSPITAL | Age: 78
End: 2018-01-19

## 2018-01-22 ENCOUNTER — APPOINTMENT (OUTPATIENT)
Dept: CARDIAC REHAB | Facility: HOSPITAL | Age: 78
End: 2018-01-22

## 2018-01-24 ENCOUNTER — APPOINTMENT (OUTPATIENT)
Dept: CARDIAC REHAB | Facility: HOSPITAL | Age: 78
End: 2018-01-24

## 2018-01-26 ENCOUNTER — APPOINTMENT (OUTPATIENT)
Dept: CARDIAC REHAB | Facility: HOSPITAL | Age: 78
End: 2018-01-26

## 2018-01-29 ENCOUNTER — APPOINTMENT (OUTPATIENT)
Dept: CARDIAC REHAB | Facility: HOSPITAL | Age: 78
End: 2018-01-29

## 2018-01-31 ENCOUNTER — APPOINTMENT (OUTPATIENT)
Dept: CARDIAC REHAB | Facility: HOSPITAL | Age: 78
End: 2018-01-31

## 2018-02-02 ENCOUNTER — APPOINTMENT (OUTPATIENT)
Dept: CARDIAC REHAB | Facility: HOSPITAL | Age: 78
End: 2018-02-02

## 2018-02-05 ENCOUNTER — APPOINTMENT (OUTPATIENT)
Dept: CARDIAC REHAB | Facility: HOSPITAL | Age: 78
End: 2018-02-05

## 2018-02-07 ENCOUNTER — APPOINTMENT (OUTPATIENT)
Dept: CARDIAC REHAB | Facility: HOSPITAL | Age: 78
End: 2018-02-07

## 2018-02-07 LAB
ALBUMIN SERPL-MCNC: 4.1 G/DL (ref 3.5–5)
ALBUMIN/GLOB SERPL: 1.5 G/DL (ref 1–2)
ALP SERPL-CCNC: 60 U/L (ref 38–126)
ALT SERPL-CCNC: 30 U/L (ref 13–69)
AST SERPL-CCNC: 22 U/L (ref 15–46)
BASOPHILS # BLD AUTO: 0.02 10*3/MM3 (ref 0–0.2)
BASOPHILS NFR BLD AUTO: 0.3 % (ref 0–2.5)
BILIRUB SERPL-MCNC: 1 MG/DL (ref 0.2–1.3)
BUN SERPL-MCNC: 16 MG/DL (ref 7–20)
BUN/CREAT SERPL: 11.4 (ref 6.3–21.9)
CALCIUM SERPL-MCNC: 9.6 MG/DL (ref 8.4–10.2)
CHLORIDE SERPL-SCNC: 103 MMOL/L (ref 98–107)
CHOLEST SERPL-MCNC: 132 MG/DL (ref 0–199)
CO2 SERPL-SCNC: 30 MMOL/L (ref 26–30)
CREAT SERPL-MCNC: 1.4 MG/DL (ref 0.6–1.3)
EOSINOPHIL # BLD AUTO: 0.26 10*3/MM3 (ref 0–0.7)
EOSINOPHIL NFR BLD AUTO: 3.5 % (ref 0–7)
ERYTHROCYTE [DISTWIDTH] IN BLOOD BY AUTOMATED COUNT: 13.6 % (ref 11.5–14.5)
GFR SERPLBLD CREATININE-BSD FMLA CKD-EPI: 49 ML/MIN/1.73
GFR SERPLBLD CREATININE-BSD FMLA CKD-EPI: 59 ML/MIN/1.73
GLOBULIN SER CALC-MCNC: 2.7 GM/DL
GLUCOSE SERPL-MCNC: 90 MG/DL (ref 74–98)
HBA1C MFR BLD: 6.7 %
HCT VFR BLD AUTO: 41 % (ref 42–52)
HDLC SERPL-MCNC: 53 MG/DL (ref 40–60)
HGB BLD-MCNC: 13.9 G/DL (ref 14–18)
IMM GRANULOCYTES # BLD: 0.02 10*3/MM3 (ref 0–0.06)
IMM GRANULOCYTES NFR BLD: 0.3 % (ref 0–0.6)
LDLC SERPL CALC-MCNC: 60 MG/DL (ref 0–99)
LYMPHOCYTES # BLD AUTO: 2.71 10*3/MM3 (ref 0.6–3.4)
LYMPHOCYTES NFR BLD AUTO: 36.8 % (ref 10–50)
MCH RBC QN AUTO: 31.4 PG (ref 27–31)
MCHC RBC AUTO-ENTMCNC: 33.9 G/DL (ref 30–37)
MCV RBC AUTO: 92.6 FL (ref 80–94)
MONOCYTES # BLD AUTO: 0.55 10*3/MM3 (ref 0–0.9)
MONOCYTES NFR BLD AUTO: 7.5 % (ref 0–12)
NEUTROPHILS # BLD AUTO: 3.81 10*3/MM3 (ref 2–6.9)
NEUTROPHILS NFR BLD AUTO: 51.6 % (ref 37–80)
NRBC BLD AUTO-RTO: 0 /100 WBC (ref 0–0)
PLATELET # BLD AUTO: 149 10*3/MM3 (ref 130–400)
POTASSIUM SERPL-SCNC: 4.5 MMOL/L (ref 3.5–5.1)
PROT SERPL-MCNC: 6.8 G/DL (ref 6.3–8.2)
RBC # BLD AUTO: 4.43 10*6/MM3 (ref 4.7–6.1)
SODIUM SERPL-SCNC: 143 MMOL/L (ref 137–145)
TRIGL SERPL-MCNC: 94 MG/DL
VLDLC SERPL CALC-MCNC: 18.8 MG/DL
WBC # BLD AUTO: 7.37 10*3/MM3 (ref 4.8–10.8)

## 2018-02-08 ENCOUNTER — OFFICE VISIT (OUTPATIENT)
Dept: INTERNAL MEDICINE | Facility: CLINIC | Age: 78
End: 2018-02-08

## 2018-02-08 VITALS
TEMPERATURE: 98.7 F | SYSTOLIC BLOOD PRESSURE: 104 MMHG | HEIGHT: 73 IN | WEIGHT: 201 LBS | OXYGEN SATURATION: 96 % | BODY MASS INDEX: 26.64 KG/M2 | HEART RATE: 68 BPM | DIASTOLIC BLOOD PRESSURE: 60 MMHG | RESPIRATION RATE: 14 BRPM

## 2018-02-08 DIAGNOSIS — I10 ESSENTIAL HYPERTENSION: ICD-10-CM

## 2018-02-08 DIAGNOSIS — K21.9 GASTROESOPHAGEAL REFLUX DISEASE WITHOUT ESOPHAGITIS: ICD-10-CM

## 2018-02-08 DIAGNOSIS — M54.2 NECK PAIN: ICD-10-CM

## 2018-02-08 DIAGNOSIS — E78.5 HYPERLIPIDEMIA, UNSPECIFIED HYPERLIPIDEMIA TYPE: ICD-10-CM

## 2018-02-08 DIAGNOSIS — E55.9 VITAMIN D DEFICIENCY: ICD-10-CM

## 2018-02-08 DIAGNOSIS — M54.16 LUMBAR RADICULOPATHY: ICD-10-CM

## 2018-02-08 DIAGNOSIS — M19.90 ARTHRITIS: Chronic | ICD-10-CM

## 2018-02-08 DIAGNOSIS — E11.8 TYPE 2 DIABETES MELLITUS WITH COMPLICATION, WITHOUT LONG-TERM CURRENT USE OF INSULIN (HCC): ICD-10-CM

## 2018-02-08 DIAGNOSIS — D50.9 IRON DEFICIENCY ANEMIA, UNSPECIFIED IRON DEFICIENCY ANEMIA TYPE: ICD-10-CM

## 2018-02-08 DIAGNOSIS — I25.119 CORONARY ARTERY DISEASE INVOLVING NATIVE CORONARY ARTERY OF NATIVE HEART WITH ANGINA PECTORIS (HCC): Primary | Chronic | ICD-10-CM

## 2018-02-08 PROBLEM — R10.9 ABDOMINAL PAIN: Status: RESOLVED | Noted: 2017-11-07 | Resolved: 2018-02-08

## 2018-02-08 PROBLEM — M54.10 RADICULOPATHY: Status: RESOLVED | Noted: 2018-01-05 | Resolved: 2018-02-08

## 2018-02-08 PROBLEM — J96.00 ACUTE RESPIRATORY FAILURE: Status: RESOLVED | Noted: 2017-10-12 | Resolved: 2018-02-08

## 2018-02-08 PROCEDURE — 99214 OFFICE O/P EST MOD 30 MIN: CPT | Performed by: INTERNAL MEDICINE

## 2018-02-08 RX ORDER — LANSOPRAZOLE 30 MG/1
30 CAPSULE, DELAYED RELEASE ORAL DAILY
Qty: 90 CAPSULE | Refills: 3 | Status: SHIPPED | OUTPATIENT
Start: 2018-02-08 | End: 2019-02-11 | Stop reason: SDUPTHER

## 2018-02-08 RX ORDER — LOSARTAN POTASSIUM 100 MG/1
TABLET ORAL
Refills: 3 | COMMUNITY
Start: 2018-01-12 | End: 2018-05-10 | Stop reason: SDUPTHER

## 2018-02-08 NOTE — PROGRESS NOTES
Subjective   Tristan Haines is a 78 y.o. male.     Chief Complaint   Patient presents with   • Hypertension   • Follow-up   • Results     stress test - sleep study    • Constipation   • Daytime Sleepiness       History of Present Illness   Patient here for follow-up.  Diabetes is stable medication.  Coronary artery disease and recently had episode of abnormal stress test now.  Hypertension stable medication.  Low back pain pending ablation.  Constipation Dulcolax helps.  Patient still has constipation problem.  Hemoglobin again low 13.9.  Patient had a history of borderline normal iron.  Hyperlipidemia stable medication.    Current Outpatient Prescriptions:   •  aspirin 81 MG tablet, Take  by mouth., Disp: , Rfl:   •  atorvastatin (LIPITOR) 40 MG tablet, TAKE 1 TABLET BY MOUTH ONE TIME A DAY  (Patient taking differently: TAKE 1 TABLET BY MOUTH ONE HS), Disp: 30 tablet, Rfl: 0  •  carvedilol (COREG) 25 MG tablet, TAKE 1 TABLET BY MOUTH TWO TIMES A DAY , Disp: 180 tablet, Rfl: 0  •  Cholecalciferol (VITAMIN D3) 2000 UNITS tablet, Take  by mouth., Disp: , Rfl:   •  dicyclomine (BENTYL) 20 MG tablet, TAKE 1 TABLET BY MOUTH EVERY EIGHT HOURS AS NEEDED FOR PAIN, Disp: , Rfl: 0  •  gabapentin (NEURONTIN) 400 MG capsule, Take 1 capsule by mouth 3 (Three) Times a Day., Disp: 270 capsule, Rfl: 2  •  glucose blood (ONE TOUCH ULTRA TEST) test strip, Use as instructed to test blood sugar, Disp: 150 each, Rfl: 4  •  Insulin Pen Needle (BD PEN NEEDLE EDDI U/F) 32G X 4 MM misc, PATIENT TESTING 3-4 TIMES A DAY., Disp: 150 each, Rfl: 11  •  lansoprazole (PREVACID) 30 MG capsule, Take 1 capsule by mouth Daily., Disp: 90 capsule, Rfl: 3  •  losartan (COZAAR) 100 MG tablet, TAKE 1 TABLET BY MOUTH ONE TIME A DAY, Disp: , Rfl: 3  •  NOVOLOG FLEXPEN 100 UNIT/ML solution pen-injector sc pen, inject 5 units under the skin 2 times per day before meals, Disp: 15 mL, Rfl: 2  •  Probiotic Product (PROBIOTIC DAILY PO), Take  by mouth., Disp: , Rfl:    •  spironolactone (ALDACTONE) 25 MG tablet, TAKE 1/4 TABLET BY MOUTH two times a day, Disp: , Rfl: 1  •  TOUJEO SOLOSTAR 300 UNIT/ML solution pen-injector, INJECT 25 UNITS SUB-Q TWICE DAILY AS DIRECTED , Disp: 4.5 mL, Rfl: 4  •  traZODone (DESYREL) 50 MG tablet, TAKE 1 TABLET BY MOUTH AT BEDTIME  (Patient taking differently: TAKE 1 TABLET BY MOUTH AT BEDTIME PRN), Disp: 30 tablet, Rfl: 3    The following portions of the patient's history were reviewed and updated as appropriate: allergies, current medications, past family history, past medical history, past social history, past surgical history and problem list.    Review of Systems   Constitutional: Negative.    Respiratory: Negative.    Cardiovascular: Negative.    Gastrointestinal: Negative.    Musculoskeletal: Negative.    Skin: Negative.    Neurological: Negative.    Psychiatric/Behavioral: Negative.        Objective   Physical Exam   Constitutional: He is oriented to person, place, and time. He appears well-nourished.   Neck: Neck supple.   Cardiovascular: Normal rate, regular rhythm and normal heart sounds.    Pulmonary/Chest: Effort normal and breath sounds normal.   Abdominal: Bowel sounds are normal.   Neurological: He is alert and oriented to person, place, and time.   Skin: Skin is warm.   Psychiatric: He has a normal mood and affect.       All tests have been reviewed.    Assessment/Plan   There are no diagnoses linked to this encounter.          Type 2 diabetes mellitus continue med A1c 6.7   Coronary artery disease involving native coronary artery of native heart with angina pectoris patient to follow up cardio, normal stress test   Essential hypertension continue med   Radiculopathy, cervical  S/p shots and doing better,   Constipation , fiber probiotic, no diarrhea  lumbar disc surgery, 2004 L4-5.  LBP lumbar radiculopathy. continue Flexeril Mobic, MRI L2/3 mod to severe stenosis. continue follow-up with chiropractor, pending  ablation  neuropathy,neurotin 400mg bid continue unable to tolerate lyrica  sacroiliac inflammation continue chiro. tylenol arthritis.  edema resolved after d/c amlodipine ---  Coronary artery disease a status post a CABG pleural effusion follow up chest x-ray continue cardio follow up  Anemia, resolved. Bright red blood per rectum Hemoccult positive m. w/u lab done.  low ferritin sbft if still anemia. s/p colonoscopy3/2017:ileitis and s/p upper endoscopy:esophagitis, Hb low,  Start iron pill --  eczema refill medicine  diarrhea, metformin caused, resolved after d/c metformin  GERD hx of  surgery continue medicine  s/p EGD  Hyperlipidemia cotninue atorvastatin 40 mg   Peripheral neuropathy continue medications  tinnitus, loss of hearing  vision change see eye doctor  leg cramps skin lesions,   hydrocele chronic patient decline to see uro   sleep disturbances, continue trazodone  Td to 2010?, prevnar 13 done, zostavax done, refuse flushot. pneumovax done--  prostate ca s/p removal PSA=0.00, now urine hesitation refer to uro.   Sleep apnea, discontinue respirator patient is unable to use and written form made for Zak. refer to dr rivera  Constipation, fiber and water and dulcolax start       CT:gall stone, hydrocele and divertic   CKD watch   Td to HD      3 mo after labs

## 2018-02-09 ENCOUNTER — APPOINTMENT (OUTPATIENT)
Dept: CARDIAC REHAB | Facility: HOSPITAL | Age: 78
End: 2018-02-09

## 2018-02-12 ENCOUNTER — APPOINTMENT (OUTPATIENT)
Dept: CARDIAC REHAB | Facility: HOSPITAL | Age: 78
End: 2018-02-12

## 2018-02-14 ENCOUNTER — APPOINTMENT (OUTPATIENT)
Dept: CARDIAC REHAB | Facility: HOSPITAL | Age: 78
End: 2018-02-14

## 2018-02-16 ENCOUNTER — APPOINTMENT (OUTPATIENT)
Dept: CARDIAC REHAB | Facility: HOSPITAL | Age: 78
End: 2018-02-16

## 2018-02-19 ENCOUNTER — APPOINTMENT (OUTPATIENT)
Dept: CARDIAC REHAB | Facility: HOSPITAL | Age: 78
End: 2018-02-19

## 2018-02-21 ENCOUNTER — APPOINTMENT (OUTPATIENT)
Dept: CARDIAC REHAB | Facility: HOSPITAL | Age: 78
End: 2018-02-21

## 2018-02-23 ENCOUNTER — APPOINTMENT (OUTPATIENT)
Dept: CARDIAC REHAB | Facility: HOSPITAL | Age: 78
End: 2018-02-23

## 2018-02-26 ENCOUNTER — APPOINTMENT (OUTPATIENT)
Dept: CARDIAC REHAB | Facility: HOSPITAL | Age: 78
End: 2018-02-26

## 2018-02-27 RX ORDER — BETAMETHASONE DIPROPIONATE 0.5 MG/G
CREAM TOPICAL DAILY
Qty: 45 G | Refills: 3 | Status: SHIPPED | OUTPATIENT
Start: 2018-02-27 | End: 2019-03-18 | Stop reason: SDUPTHER

## 2018-02-27 RX ORDER — SPIRONOLACTONE 25 MG/1
25 TABLET ORAL DAILY
Qty: 90 TABLET | Refills: 3 | Status: SHIPPED | OUTPATIENT
Start: 2018-02-27 | End: 2019-02-05

## 2018-02-28 ENCOUNTER — APPOINTMENT (OUTPATIENT)
Dept: CARDIAC REHAB | Facility: HOSPITAL | Age: 78
End: 2018-02-28

## 2018-03-02 ENCOUNTER — APPOINTMENT (OUTPATIENT)
Dept: CARDIAC REHAB | Facility: HOSPITAL | Age: 78
End: 2018-03-02

## 2018-03-05 ENCOUNTER — APPOINTMENT (OUTPATIENT)
Dept: CARDIAC REHAB | Facility: HOSPITAL | Age: 78
End: 2018-03-05

## 2018-03-07 ENCOUNTER — APPOINTMENT (OUTPATIENT)
Dept: CARDIAC REHAB | Facility: HOSPITAL | Age: 78
End: 2018-03-07

## 2018-03-09 ENCOUNTER — APPOINTMENT (OUTPATIENT)
Dept: CARDIAC REHAB | Facility: HOSPITAL | Age: 78
End: 2018-03-09

## 2018-03-12 ENCOUNTER — APPOINTMENT (OUTPATIENT)
Dept: CARDIAC REHAB | Facility: HOSPITAL | Age: 78
End: 2018-03-12

## 2018-03-14 ENCOUNTER — APPOINTMENT (OUTPATIENT)
Dept: CARDIAC REHAB | Facility: HOSPITAL | Age: 78
End: 2018-03-14

## 2018-03-16 ENCOUNTER — APPOINTMENT (OUTPATIENT)
Dept: CARDIAC REHAB | Facility: HOSPITAL | Age: 78
End: 2018-03-16

## 2018-03-19 ENCOUNTER — TELEPHONE (OUTPATIENT)
Dept: INTERNAL MEDICINE | Facility: CLINIC | Age: 78
End: 2018-03-19

## 2018-03-19 ENCOUNTER — APPOINTMENT (OUTPATIENT)
Dept: CARDIAC REHAB | Facility: HOSPITAL | Age: 78
End: 2018-03-19

## 2018-03-19 RX ORDER — ATORVASTATIN CALCIUM 40 MG/1
40 TABLET, FILM COATED ORAL DAILY
Qty: 90 TABLET | Refills: 3 | Status: SHIPPED | OUTPATIENT
Start: 2018-03-19 | End: 2019-02-17 | Stop reason: SDUPTHER

## 2018-03-21 ENCOUNTER — APPOINTMENT (OUTPATIENT)
Dept: CARDIAC REHAB | Facility: HOSPITAL | Age: 78
End: 2018-03-21

## 2018-03-23 ENCOUNTER — APPOINTMENT (OUTPATIENT)
Dept: CARDIAC REHAB | Facility: HOSPITAL | Age: 78
End: 2018-03-23

## 2018-03-26 ENCOUNTER — APPOINTMENT (OUTPATIENT)
Dept: CARDIAC REHAB | Facility: HOSPITAL | Age: 78
End: 2018-03-26

## 2018-03-28 ENCOUNTER — APPOINTMENT (OUTPATIENT)
Dept: CARDIAC REHAB | Facility: HOSPITAL | Age: 78
End: 2018-03-28

## 2018-03-30 ENCOUNTER — APPOINTMENT (OUTPATIENT)
Dept: CARDIAC REHAB | Facility: HOSPITAL | Age: 78
End: 2018-03-30

## 2018-04-02 ENCOUNTER — APPOINTMENT (OUTPATIENT)
Dept: CARDIAC REHAB | Facility: HOSPITAL | Age: 78
End: 2018-04-02

## 2018-04-04 ENCOUNTER — APPOINTMENT (OUTPATIENT)
Dept: CARDIAC REHAB | Facility: HOSPITAL | Age: 78
End: 2018-04-04

## 2018-04-06 ENCOUNTER — APPOINTMENT (OUTPATIENT)
Dept: CARDIAC REHAB | Facility: HOSPITAL | Age: 78
End: 2018-04-06

## 2018-04-09 ENCOUNTER — APPOINTMENT (OUTPATIENT)
Dept: CARDIAC REHAB | Facility: HOSPITAL | Age: 78
End: 2018-04-09

## 2018-04-10 ENCOUNTER — OFFICE VISIT (OUTPATIENT)
Dept: INTERNAL MEDICINE | Facility: CLINIC | Age: 78
End: 2018-04-10

## 2018-04-10 ENCOUNTER — HOSPITAL ENCOUNTER (OUTPATIENT)
Dept: GENERAL RADIOLOGY | Facility: HOSPITAL | Age: 78
Discharge: HOME | End: 2018-04-10
Attending: INTERNAL MEDICINE | Admitting: INTERNAL MEDICINE

## 2018-04-10 VITALS
SYSTOLIC BLOOD PRESSURE: 146 MMHG | HEART RATE: 90 BPM | HEIGHT: 73 IN | TEMPERATURE: 99.5 F | BODY MASS INDEX: 26.51 KG/M2 | RESPIRATION RATE: 14 BRPM | OXYGEN SATURATION: 94 % | WEIGHT: 200 LBS | DIASTOLIC BLOOD PRESSURE: 60 MMHG

## 2018-04-10 DIAGNOSIS — J40 BRONCHITIS: Primary | ICD-10-CM

## 2018-04-10 PROCEDURE — 71046 X-RAY EXAM CHEST 2 VIEWS: CPT

## 2018-04-10 PROCEDURE — 99213 OFFICE O/P EST LOW 20 MIN: CPT | Performed by: INTERNAL MEDICINE

## 2018-04-10 RX ORDER — ALBUTEROL SULFATE 90 UG/1
2 AEROSOL, METERED RESPIRATORY (INHALATION) EVERY 6 HOURS PRN
Qty: 1 INHALER | Refills: 1 | Status: SHIPPED | OUTPATIENT
Start: 2018-04-10 | End: 2019-07-24

## 2018-04-10 RX ORDER — AZITHROMYCIN 250 MG/1
TABLET, FILM COATED ORAL
Refills: 0 | COMMUNITY
Start: 2018-04-08 | End: 2018-04-11

## 2018-04-10 NOTE — PROGRESS NOTES
Subjective   Tristan Haines is a 78 y.o. male.     Chief Complaint   Patient presents with   • Cough   • Sore Throat   • URI     tightness in chest    • Nasal Congestion   • Wheezing   • Shortness of Breath       Cough   This is a new problem. Episode onset: 3 day. The problem has been unchanged. The problem occurs constantly. The cough is productive of sputum. Associated symptoms include chills, ear congestion, ear pain, a fever, headaches, myalgias, nasal congestion, postnasal drip, rhinorrhea, a sore throat, shortness of breath, sweats and wheezing. Pertinent negatives include no hemoptysis. Associated symptoms comments: fatigue. The symptoms are aggravated by lying down. He has tried OTC cough suppressant (zpak) for the symptoms. The treatment provided no relief.          Current Outpatient Prescriptions:   •  aspirin 81 MG tablet, Take  by mouth., Disp: , Rfl:   •  atorvastatin (LIPITOR) 40 MG tablet, Take 1 tablet by mouth Daily. 200001, Disp: 90 tablet, Rfl: 3  •  azithromycin (ZITHROMAX) 250 MG tablet, TAKE 2 TABLETS BY MOUTH ON DAY 1, THEN TAKE 1 TABLET BY MOUTH DAILY DAYS 2 THRU 5, Disp: , Rfl: 0  •  betamethasone dipropionate (DIPROLENE) 0.05 % cream, Apply  topically Daily., Disp: 45 g, Rfl: 3  •  carvedilol (COREG) 25 MG tablet, TAKE 1 TABLET BY MOUTH TWO TIMES A DAY , Disp: 180 tablet, Rfl: 0  •  Cholecalciferol (VITAMIN D3) 2000 UNITS tablet, Take  by mouth., Disp: , Rfl:   •  dicyclomine (BENTYL) 20 MG tablet, TAKE 1 TABLET BY MOUTH EVERY EIGHT HOURS AS NEEDED FOR PAIN, Disp: , Rfl: 0  •  gabapentin (NEURONTIN) 400 MG capsule, Take 1 capsule by mouth 3 (Three) Times a Day., Disp: 270 capsule, Rfl: 2  •  glucose blood (ONE TOUCH ULTRA TEST) test strip, Use as instructed to test blood sugar, Disp: 150 each, Rfl: 4  •  Insulin Pen Needle (BD PEN NEEDLE EDDI U/F) 32G X 4 MM misc, PATIENT TESTING 3-4 TIMES A DAY., Disp: 150 each, Rfl: 11  •  Iron, Ferrous Gluconate, 256 (28 Fe) MG tablet, 1 daily po, Disp:  30 tablet, Rfl: 3  •  lansoprazole (PREVACID) 30 MG capsule, Take 1 capsule by mouth Daily., Disp: 90 capsule, Rfl: 3  •  losartan (COZAAR) 100 MG tablet, TAKE 1 TABLET BY MOUTH ONE TIME A DAY, Disp: , Rfl: 3  •  NOVOLOG FLEXPEN 100 UNIT/ML solution pen-injector sc pen, inject 5 units under the skin 2 times per day before meals, Disp: 15 mL, Rfl: 2  •  Probiotic Product (PROBIOTIC DAILY PO), Take  by mouth., Disp: , Rfl:   •  spironolactone (ALDACTONE) 25 MG tablet, Take 1 tablet by mouth Daily., Disp: 90 tablet, Rfl: 3  •  TOUJEO SOLOSTAR 300 UNIT/ML solution pen-injector, INJECT 25 UNITS SUB-Q TWICE DAILY AS DIRECTED , Disp: 4.5 mL, Rfl: 4  •  traZODone (DESYREL) 50 MG tablet, TAKE 1 TABLET BY MOUTH AT BEDTIME  (Patient taking differently: TAKE 1 TABLET BY MOUTH AT BEDTIME PRN), Disp: 30 tablet, Rfl: 3  •  albuterol (PROVENTIL HFA;VENTOLIN HFA) 108 (90 Base) MCG/ACT inhaler, Inhale 2 puffs Every 6 (Six) Hours As Needed for Wheezing., Disp: 1 inhaler, Rfl: 1    The following portions of the patient's history were reviewed and updated as appropriate: allergies, current medications, past family history, past medical history, past social history, past surgical history and problem list.    Review of Systems   Constitutional: Positive for chills and fever.   HENT: Positive for ear pain, postnasal drip, rhinorrhea and sore throat.    Respiratory: Positive for cough, shortness of breath and wheezing. Negative for hemoptysis.    Cardiovascular: Negative.    Gastrointestinal: Negative.    Musculoskeletal: Positive for myalgias.   Skin: Negative.    Neurological: Positive for headaches.   Psychiatric/Behavioral: Negative.        Objective   Physical Exam   Constitutional: He is oriented to person, place, and time. He appears well-developed and well-nourished.   Neck: Neck supple.   Cardiovascular: Normal rate, regular rhythm and normal heart sounds.    Pulmonary/Chest: Effort normal. He has wheezes. He has rales.    Abdominal: Bowel sounds are normal.   Neurological: He is alert and oriented to person, place, and time.   Skin: Skin is warm.   Psychiatric: He has a normal mood and affect.       All tests have been reviewed.    Assessment/Plan   Diagnoses and all orders for this visit:    Bronchitis  -     XR Chest PA & Lateral    Other orders  -     azithromycin (ZITHROMAX) 250 MG tablet; TAKE 2 TABLETS BY MOUTH ON DAY 1, THEN TAKE 1 TABLET BY MOUTH DAILY DAYS 2 THRU 5  -     albuterol (PROVENTIL HFA;VENTOLIN HFA) 108 (90 Base) MCG/ACT inhaler; Inhale 2 puffs Every 6 (Six) Hours As Needed for Wheezing.    tylenol and mucinex and  zyrtec , flu test, rest and fluids

## 2018-04-11 ENCOUNTER — APPOINTMENT (OUTPATIENT)
Dept: CARDIAC REHAB | Facility: HOSPITAL | Age: 78
End: 2018-04-11

## 2018-04-11 RX ORDER — AZITHROMYCIN 500 MG/1
500 TABLET, FILM COATED ORAL DAILY
Qty: 3 TABLET | Refills: 0 | Status: SHIPPED | OUTPATIENT
Start: 2018-04-11 | End: 2018-05-10

## 2018-04-13 ENCOUNTER — APPOINTMENT (OUTPATIENT)
Dept: CARDIAC REHAB | Facility: HOSPITAL | Age: 78
End: 2018-04-13

## 2018-04-16 ENCOUNTER — APPOINTMENT (OUTPATIENT)
Dept: CARDIAC REHAB | Facility: HOSPITAL | Age: 78
End: 2018-04-16

## 2018-04-18 ENCOUNTER — APPOINTMENT (OUTPATIENT)
Dept: CARDIAC REHAB | Facility: HOSPITAL | Age: 78
End: 2018-04-18

## 2018-04-20 ENCOUNTER — APPOINTMENT (OUTPATIENT)
Dept: CARDIAC REHAB | Facility: HOSPITAL | Age: 78
End: 2018-04-20

## 2018-04-23 ENCOUNTER — APPOINTMENT (OUTPATIENT)
Dept: CARDIAC REHAB | Facility: HOSPITAL | Age: 78
End: 2018-04-23

## 2018-04-25 ENCOUNTER — APPOINTMENT (OUTPATIENT)
Dept: CARDIAC REHAB | Facility: HOSPITAL | Age: 78
End: 2018-04-25

## 2018-04-27 ENCOUNTER — APPOINTMENT (OUTPATIENT)
Dept: CARDIAC REHAB | Facility: HOSPITAL | Age: 78
End: 2018-04-27

## 2018-04-30 ENCOUNTER — APPOINTMENT (OUTPATIENT)
Dept: CARDIAC REHAB | Facility: HOSPITAL | Age: 78
End: 2018-04-30

## 2018-05-02 ENCOUNTER — APPOINTMENT (OUTPATIENT)
Dept: CARDIAC REHAB | Facility: HOSPITAL | Age: 78
End: 2018-05-02

## 2018-05-04 ENCOUNTER — APPOINTMENT (OUTPATIENT)
Dept: CARDIAC REHAB | Facility: HOSPITAL | Age: 78
End: 2018-05-04

## 2018-05-07 ENCOUNTER — APPOINTMENT (OUTPATIENT)
Dept: CARDIAC REHAB | Facility: HOSPITAL | Age: 78
End: 2018-05-07

## 2018-05-08 ENCOUNTER — APPOINTMENT (OUTPATIENT)
Dept: GENERAL RADIOLOGY | Facility: HOSPITAL | Age: 78
End: 2018-05-08

## 2018-05-08 ENCOUNTER — HOSPITAL ENCOUNTER (EMERGENCY)
Facility: HOSPITAL | Age: 78
Discharge: HOME | End: 2018-05-08
Attending: EMERGENCY MEDICINE | Admitting: EMERGENCY MEDICINE

## 2018-05-08 ENCOUNTER — APPOINTMENT (OUTPATIENT)
Dept: CT IMAGING | Facility: HOSPITAL | Age: 78
End: 2018-05-08

## 2018-05-08 VITALS
WEIGHT: 201 LBS | RESPIRATION RATE: 18 BRPM | DIASTOLIC BLOOD PRESSURE: 79 MMHG | BODY MASS INDEX: 28.14 KG/M2 | HEART RATE: 69 BPM | TEMPERATURE: 97.4 F | HEIGHT: 71 IN | OXYGEN SATURATION: 96 % | SYSTOLIC BLOOD PRESSURE: 156 MMHG

## 2018-05-08 DIAGNOSIS — S40.012A CONTUSION OF LEFT SHOULDER, INITIAL ENCOUNTER: ICD-10-CM

## 2018-05-08 DIAGNOSIS — S09.90XA MINOR HEAD INJURY WITHOUT LOSS OF CONSCIOUSNESS, INITIAL ENCOUNTER: Primary | ICD-10-CM
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
## 2018-05-08 DIAGNOSIS — S00.432A CONTUSION OF AURICLE OF EAR, LEFT, INITIAL ENCOUNTER: ICD-10-CM

## 2018-05-08 PROCEDURE — 70450 CT HEAD/BRAIN W/O DYE: CPT

## 2018-05-08 PROCEDURE — 99283 EMERGENCY DEPT VISIT LOW MDM: CPT

## 2018-05-08 PROCEDURE — 73030 X-RAY EXAM OF SHOULDER: CPT

## 2018-05-08 RX ORDER — ETHYL CHLORIDE 100 %
AEROSOL, SPRAY (ML) TOPICAL
Status: DISCONTINUED
Start: 2018-05-08 | End: 2018-05-08 | Stop reason: HOSPADM

## 2018-05-08 RX ORDER — IBUPROFEN 200 MG
1 TABLET ORAL ONCE
Status: COMPLETED | OUTPATIENT
Start: 2018-05-08 | End: 2018-05-08

## 2018-05-08 RX ORDER — LIDOCAINE HYDROCHLORIDE 10 MG/ML
10 INJECTION, SOLUTION INFILTRATION; PERINEURAL ONCE
Status: DISCONTINUED | OUTPATIENT
Start: 2018-05-08 | End: 2018-05-08

## 2018-05-08 RX ADMIN — POLYMYXIN B SULFATE, BACITRACIN ZINC, NEOMYCIN SULFATE 1 APPLICATION: 5000; 3.5; 4 OINTMENT TOPICAL at 18:32

## 2018-05-08 NOTE — ED PROVIDER NOTES
Subjective   78-year-old male in the ER with his wife, the patient had a fall prior to arrival and has a head injury.  The patient was weed eating on a bank when he fell striking his left ear on a tree.  He denies any loss of consciousness.  He denies any chest pain, difficulty breathing, abdominal pain, nausea or vomiting.  He was not weak or dizzy prior to the fall.  Has a history of ablations to his spinal area and states has leg weakness from this that causes falls.  He also has an abrasion to the left shoulder area.  He states his tetanus is up-to-date. Patient's wife has not noted any confusion.        History provided by:  Patient and spouse  History limited by: no limits.   used: No        Review of Systems   Constitutional: Negative.  Negative for appetite change, chills, diaphoresis and fever.   HENT: Positive for ear pain. Negative for congestion and sore throat.         Left auricle swelling/bruising   Eyes: Negative.    Respiratory: Negative.  Negative for cough, chest tightness, shortness of breath and wheezing.    Cardiovascular: Negative.  Negative for chest pain.   Gastrointestinal: Negative.  Negative for abdominal pain, blood in stool, diarrhea, nausea and vomiting.   Endocrine: Negative.    Genitourinary: Negative.  Negative for difficulty urinating.   Musculoskeletal: Negative.  Negative for back pain and neck pain.   Skin: Positive for wound. Negative for rash.   Allergic/Immunologic: Negative.    Neurological: Negative.  Negative for dizziness, syncope, weakness, light-headedness and headaches.   Hematological: Negative.    Psychiatric/Behavioral: Negative.    All other systems reviewed and are negative.      Past Medical History:   Diagnosis Date   • Abdominal pain     Hx of.   • Atypical chest pain     Hx of.   • Colon polyps    • Diabetes mellitus    • Diarrhea     diarrhea, metformin caused, resolved after d/c metformin   • Eczema     Requesting ointment   • Edema of  leg    • Hernia     heietal hernia   • Leg cramps    • Neck pain    • Nephrolithiasis    • Prostate cancer    • Skin lesions    • SOB (shortness of breath)    • Tuberculin skin test positive    • Vitamin D deficiency        Allergies   Allergen Reactions   • Ciprofloxacin    • Mesalamine    • Metronidazole        Past Surgical History:   Procedure Laterality Date   • COLONOSCOPY      2017-[facility]   • CORONARY ARTERY BYPASS GRAFT     • INGUINAL HERNIA REPAIR  1989    Hiatal hernia   • PROSTATE SURGERY      Prostate cancer s/p removal do PSA   • SMALL INTESTINE SURGERY         Family History   Problem Relation Age of Onset   • Cancer Mother    • Diabetes Father    • Hypertension Father    • Liver disease Father    • Cancer Sister    • Diabetes Brother    • Heart attack Other      grandparent   • Arthritis Other    • Heart attack Other        Social History     Social History   • Marital status:      Social History Main Topics   • Smoking status: Never Smoker   • Alcohol use No   • Drug use: No   • Sexual activity: Defer     Other Topics Concern   • Not on file           Objective   Physical Exam   Constitutional: He is oriented to person, place, and time. He appears well-developed and well-nourished. No distress.   HENT:   Head: Normocephalic and atraumatic.   Right Ear: External ear normal.   Nose: Nose normal.   Mouth/Throat: Oropharynx is clear and moist. No oropharyngeal exudate.   Left auricle with swelling, purplish bruising and superficial abrasions. TM and canal are clear.  Teeth are intact, no oral injury noted/  Pt able to fully open and close mouth, no tmj tenderness.   Eyes: Conjunctivae and EOM are normal. Pupils are equal, round, and reactive to light. Right eye exhibits no discharge. Left eye exhibits no discharge. No scleral icterus.   Neck: Normal range of motion. Neck supple. No JVD present. No tracheal deviation present.   No posterior midline neck pain.   Cardiovascular: Normal rate, regular  rhythm and normal heart sounds.    Pulmonary/Chest: Effort normal and breath sounds normal. No stridor. No respiratory distress. He has no wheezes. He has no rales.   Abdominal: Soft. Bowel sounds are normal. He exhibits no mass. There is no tenderness. There is no rebound and no guarding. No hernia.   Musculoskeletal: Normal range of motion. He exhibits no edema, tenderness or deformity.   Lymphadenopathy:     He has no cervical adenopathy.   Neurological: He is alert and oriented to person, place, and time. No cranial nerve deficit. He exhibits normal muscle tone. Coordination normal.   Skin: Skin is warm and dry. No rash noted. He is not diaphoretic. No erythema. No pallor.   Psychiatric: He has a normal mood and affect. His behavior is normal. Judgment and thought content normal.   Nursing note and vitals reviewed.      Procedures           ED Course  ED Course   Comment By Time   Pt with auricle contusion that needs drained,  Pt agreeable to procedure.  Explained indications and risk to patient.  He declined any numbing medication.  Ear gently cleaned with josé luis-cleanse, irrigated and 1/2 cm incision with scalpel to mid outer auricle, gently expressed blood, pt voiced pain improvement after this, neosporin applied to auricle, 4 x 4's used to prop up ear posteriorly and covered front of auricle, ear and head wrapped with cling roll, pt understand importance of close f/u with ENT in am.  Understands what cauliflower ear is and understands is at risk to develop and will cause permament disfiguration.  Questions answered to the patient's satisfaction, he in his wife are both agreeable to treatment plan for the patient to be discharged home.  Cheryl Sandy PA-C 05/08 3512                  [facility]  Number of Diagnoses or Management Options  Contusion of auricle of ear, left, initial encounter: new and does not require workup  Contusion of left shoulder, initial encounter: new and requires workup  Fall from [place] as cause of  accidental injury at home as place of occurrence, initial encounter: new and requires workup  Minor head injury without loss of consciousness, initial encounter: new and requires workup     Amount and/or Complexity of Data Reviewed  Tests in the radiology section of CPT®: ordered and reviewed    Risk of Complications, Morbidity, and/or Mortality  Presenting problems: high  Diagnostic procedures: moderate  Management options: moderate    Patient Progress  Patient progress: improved        Final diagnoses:   Minor head injury without loss of consciousness, initial encounter   Contusion of auricle of ear, left, initial encounter   Contusion of left shoulder, initial encounter   Fall from [place] as cause of accidental injury at home as place of occurrence, initial encounter            Cheryl Sandy PA-C  05/08/18 0452

## 2018-05-08 NOTE — DISCHARGE INSTRUCTIONS
Alternate Tylenol and Motrin every 4-6 hours as needed for discomfort.  Leave your dressing in place until seen by the ENT in the morning.  Call the ENT in the morning and arrange a follow-up appointment.  Return to the ER tonight for any increased swelling in her left ear, increased pain, confusion, severe headache, difficulty walking or talking, vomiting or any other worrisome changes.

## 2018-05-09 ENCOUNTER — APPOINTMENT (OUTPATIENT)
Dept: CARDIAC REHAB | Facility: HOSPITAL | Age: 78
End: 2018-05-09

## 2018-05-09 ENCOUNTER — EPISODE CHANGES (OUTPATIENT)
Dept: CASE MANAGEMENT | Facility: OTHER | Age: 78
End: 2018-05-09

## 2018-05-10 ENCOUNTER — OFFICE VISIT (OUTPATIENT)
Dept: INTERNAL MEDICINE | Facility: CLINIC | Age: 78
End: 2018-05-10

## 2018-05-10 VITALS
SYSTOLIC BLOOD PRESSURE: 130 MMHG | WEIGHT: 200.5 LBS | OXYGEN SATURATION: 94 % | TEMPERATURE: 98 F | BODY MASS INDEX: 28.07 KG/M2 | HEART RATE: 59 BPM | DIASTOLIC BLOOD PRESSURE: 82 MMHG | HEIGHT: 71 IN

## 2018-05-10 DIAGNOSIS — E78.5 HYPERLIPIDEMIA, UNSPECIFIED HYPERLIPIDEMIA TYPE: ICD-10-CM

## 2018-05-10 DIAGNOSIS — E11.8 TYPE 2 DIABETES MELLITUS WITH COMPLICATION, WITHOUT LONG-TERM CURRENT USE OF INSULIN (HCC): ICD-10-CM

## 2018-05-10 DIAGNOSIS — M54.16 LUMBAR RADICULOPATHY: ICD-10-CM

## 2018-05-10 DIAGNOSIS — I10 ESSENTIAL HYPERTENSION: ICD-10-CM

## 2018-05-10 DIAGNOSIS — G47.00 INSOMNIA, UNSPECIFIED TYPE: Chronic | ICD-10-CM

## 2018-05-10 DIAGNOSIS — E55.9 VITAMIN D DEFICIENCY: ICD-10-CM

## 2018-05-10 DIAGNOSIS — R91.8 PULMONARY INFILTRATE: ICD-10-CM

## 2018-05-10 DIAGNOSIS — I25.119 CORONARY ARTERY DISEASE INVOLVING NATIVE CORONARY ARTERY OF NATIVE HEART WITH ANGINA PECTORIS (HCC): Primary | Chronic | ICD-10-CM

## 2018-05-10 DIAGNOSIS — K21.9 GASTROESOPHAGEAL REFLUX DISEASE WITHOUT ESOPHAGITIS: ICD-10-CM

## 2018-05-10 PROBLEM — J40 BRONCHITIS: Status: RESOLVED | Noted: 2018-04-10 | Resolved: 2018-05-10

## 2018-05-10 PROCEDURE — 99214 OFFICE O/P EST MOD 30 MIN: CPT | Performed by: INTERNAL MEDICINE

## 2018-05-10 RX ORDER — LOSARTAN POTASSIUM 100 MG/1
100 TABLET ORAL DAILY
Qty: 90 TABLET | Refills: 3 | Status: SHIPPED | OUTPATIENT
Start: 2018-05-10 | End: 2019-01-24 | Stop reason: SDUPTHER

## 2018-05-10 NOTE — PROGRESS NOTES
Subjective   Tristan Haines is a 78 y.o. male.     Chief Complaint   Patient presents with   • Follow-up     3 month Follow up for CAD, DM. Patient was seen in the ER on 5/8/18 for fall and concussion.       History of Present Illness       Current Outpatient Prescriptions:   •  albuterol (PROVENTIL HFA;VENTOLIN HFA) 108 (90 Base) MCG/ACT inhaler, Inhale 2 puffs Every 6 (Six) Hours As Needed for Wheezing., Disp: 1 inhaler, Rfl: 1  •  aspirin 81 MG tablet, Take  by mouth., Disp: , Rfl:   •  atorvastatin (LIPITOR) 40 MG tablet, Take 1 tablet by mouth Daily. 200001, Disp: 90 tablet, Rfl: 3  •  betamethasone dipropionate (DIPROLENE) 0.05 % cream, Apply  topically Daily., Disp: 45 g, Rfl: 3  •  carvedilol (COREG) 25 MG tablet, TAKE 1 TABLET BY MOUTH TWO TIMES A DAY , Disp: 180 tablet, Rfl: 0  •  Cholecalciferol (VITAMIN D3) 2000 UNITS tablet, Take  by mouth., Disp: , Rfl:   •  dicyclomine (BENTYL) 20 MG tablet, TAKE 1 TABLET BY MOUTH EVERY EIGHT HOURS AS NEEDED FOR PAIN, Disp: , Rfl: 0  •  gabapentin (NEURONTIN) 400 MG capsule, Take 1 capsule by mouth 3 (Three) Times a Day., Disp: 270 capsule, Rfl: 2  •  glucose blood (ONE TOUCH ULTRA TEST) test strip, Use as instructed to test blood sugar, Disp: 150 each, Rfl: 4  •  Insulin Pen Needle (BD PEN NEEDLE EDDI U/F) 32G X 4 MM misc, PATIENT TESTING 3-4 TIMES A DAY., Disp: 150 each, Rfl: 11  •  Iron, Ferrous Gluconate, 256 (28 Fe) MG tablet, 1 daily po, Disp: 30 tablet, Rfl: 3  •  lansoprazole (PREVACID) 30 MG capsule, Take 1 capsule by mouth Daily., Disp: 90 capsule, Rfl: 3  •  losartan (COZAAR) 100 MG tablet, Take 1 tablet by mouth Daily. 200001, Disp: 90 tablet, Rfl: 3  •  NOVOLOG FLEXPEN 100 UNIT/ML solution pen-injector sc pen, inject 5 units under the skin 2 times per day before meals, Disp: 15 mL, Rfl: 2  •  Probiotic Product (PROBIOTIC DAILY PO), Take  by mouth., Disp: , Rfl:   •  spironolactone (ALDACTONE) 25 MG tablet, Take 1 tablet by mouth Daily., Disp: 90 tablet, Rfl:  3  •  TOUJEO SOLOSTAR 300 UNIT/ML solution pen-injector, INJECT 25 UNITS SUB-Q TWICE DAILY AS DIRECTED , Disp: 4.5 mL, Rfl: 4  •  traZODone (DESYREL) 50 MG tablet, TAKE 1 TABLET BY MOUTH AT BEDTIME  (Patient taking differently: TAKE 1 TABLET BY MOUTH AT BEDTIME PRN), Disp: 30 tablet, Rfl: 3    The following portions of the patient's history were reviewed and updated as appropriate: allergies, current medications, past family history, past medical history, past social history, past surgical history and problem list.    Review of Systems   Constitutional: Negative.    Respiratory: Negative.    Cardiovascular: Negative.    Gastrointestinal: Negative.    Musculoskeletal: Positive for arthralgias.   Skin: Negative.    Neurological: Negative.    Psychiatric/Behavioral: Negative.        Objective   Physical Exam   Constitutional: He is oriented to person, place, and time. He appears well-nourished.   Neck: Neck supple.   Cardiovascular: Normal rate, regular rhythm and normal heart sounds.    Pulmonary/Chest: Effort normal and breath sounds normal.   Abdominal: Bowel sounds are normal.   Neurological: He is alert and oriented to person, place, and time.   Skin: Skin is warm.   Psychiatric: He has a normal mood and affect.       All tests have been reviewed.    Assessment/Plan   There are no diagnoses linked to this encounter.           Type 2 diabetes mellitus continue med A1c 6.7   Coronary artery disease involving native coronary artery of native heart with angina pectoris patient to follow up cardio, normal stress test   Essential hypertension continue med   Radiculopathy, cervical  S/p shots and doing better,   Constipation , fiber probiotic, no diarrhea  lumbar disc surgery, 2004 L4-5.  LBP lumbar radiculopathy. continue Flexeril Mobic, MRI L2/3 mod to severe stenosis. continue follow-up with chiropractor, s/p ablation  neuropathy,neurotin 400mg bid continue unable to tolerate lyrica  sacroiliac inflammation continue  chiro. tylenol arthritis.  edema resolved after d/c amlodipine  Coronary artery disease a status post a CABG pleural effusion follow up chest x-ray continue cardio follow up  Anemia, resolved. Bright red blood per rectum Hemoccult positive m. w/u lab done.  low ferritin sbft if still anemia. s/p colonoscopy3/2017:ileitis and s/p upper endoscopy:esophagitis, Hb low,  Start iron pill do lab  eczema refill medicine  diarrhea, metformin caused, resolved after d/c metformin  GERD hx of HH surgery continue medicine  s/p EGD  Hyperlipidemia cotninue atorvastatin 40 mg   Peripheral neuropathy continue medications  tinnitus, loss of hearing  vision change see eye doctor  leg cramps skin lesions,   hydrocele chronic patient decline to see uro   sleep disturbances, continue trazodone  Td to 2010?, prevnar 13 done, zostavax done, refuse flushot. pneumovax done--  prostate ca s/p removal PSA=0.00, now urine hesitation seen by uro.   Sleep apnea, sleep study negative for sleep apnea  Constipation, fiber and water and dulcolax start       CT:gall stone, hydrocele and divertic   CKD watch   Td to HD  2 days ago fell to ER CT head and left shoulder XR no fracture, left ear swelling continue topical abx and watch call if worse      3 mo after labs

## 2018-05-11 ENCOUNTER — APPOINTMENT (OUTPATIENT)
Dept: CARDIAC REHAB | Facility: HOSPITAL | Age: 78
End: 2018-05-11

## 2018-05-14 ENCOUNTER — APPOINTMENT (OUTPATIENT)
Dept: CARDIAC REHAB | Facility: HOSPITAL | Age: 78
End: 2018-05-14

## 2018-05-16 ENCOUNTER — APPOINTMENT (OUTPATIENT)
Dept: CARDIAC REHAB | Facility: HOSPITAL | Age: 78
End: 2018-05-16

## 2018-05-18 ENCOUNTER — APPOINTMENT (OUTPATIENT)
Dept: CARDIAC REHAB | Facility: HOSPITAL | Age: 78
End: 2018-05-18

## 2018-05-21 ENCOUNTER — APPOINTMENT (OUTPATIENT)
Dept: CARDIAC REHAB | Facility: HOSPITAL | Age: 78
End: 2018-05-21

## 2018-05-23 ENCOUNTER — APPOINTMENT (OUTPATIENT)
Dept: CARDIAC REHAB | Facility: HOSPITAL | Age: 78
End: 2018-05-23

## 2018-05-25 ENCOUNTER — APPOINTMENT (OUTPATIENT)
Dept: CARDIAC REHAB | Facility: HOSPITAL | Age: 78
End: 2018-05-25

## 2018-05-30 ENCOUNTER — APPOINTMENT (OUTPATIENT)
Dept: CARDIAC REHAB | Facility: HOSPITAL | Age: 78
End: 2018-05-30

## 2018-06-01 ENCOUNTER — APPOINTMENT (OUTPATIENT)
Dept: CARDIAC REHAB | Facility: HOSPITAL | Age: 78
End: 2018-06-01

## 2018-06-04 ENCOUNTER — APPOINTMENT (OUTPATIENT)
Dept: CARDIAC REHAB | Facility: HOSPITAL | Age: 78
End: 2018-06-04

## 2018-06-06 ENCOUNTER — APPOINTMENT (OUTPATIENT)
Dept: CARDIAC REHAB | Facility: HOSPITAL | Age: 78
End: 2018-06-06

## 2018-06-08 ENCOUNTER — APPOINTMENT (OUTPATIENT)
Dept: CARDIAC REHAB | Facility: HOSPITAL | Age: 78
End: 2018-06-08

## 2018-06-11 ENCOUNTER — APPOINTMENT (OUTPATIENT)
Dept: CARDIAC REHAB | Facility: HOSPITAL | Age: 78
End: 2018-06-11

## 2018-06-13 ENCOUNTER — APPOINTMENT (OUTPATIENT)
Dept: CARDIAC REHAB | Facility: HOSPITAL | Age: 78
End: 2018-06-13

## 2018-06-15 ENCOUNTER — APPOINTMENT (OUTPATIENT)
Dept: CARDIAC REHAB | Facility: HOSPITAL | Age: 78
End: 2018-06-15

## 2018-06-18 ENCOUNTER — APPOINTMENT (OUTPATIENT)
Dept: CARDIAC REHAB | Facility: HOSPITAL | Age: 78
End: 2018-06-18

## 2018-06-20 ENCOUNTER — APPOINTMENT (OUTPATIENT)
Dept: CARDIAC REHAB | Facility: HOSPITAL | Age: 78
End: 2018-06-20

## 2018-06-22 ENCOUNTER — APPOINTMENT (OUTPATIENT)
Dept: CARDIAC REHAB | Facility: HOSPITAL | Age: 78
End: 2018-06-22

## 2018-06-25 ENCOUNTER — APPOINTMENT (OUTPATIENT)
Dept: CARDIAC REHAB | Facility: HOSPITAL | Age: 78
End: 2018-06-25

## 2018-06-27 ENCOUNTER — APPOINTMENT (OUTPATIENT)
Dept: CARDIAC REHAB | Facility: HOSPITAL | Age: 78
End: 2018-06-27

## 2018-06-29 ENCOUNTER — APPOINTMENT (OUTPATIENT)
Dept: CARDIAC REHAB | Facility: HOSPITAL | Age: 78
End: 2018-06-29

## 2018-07-02 ENCOUNTER — APPOINTMENT (OUTPATIENT)
Dept: CARDIAC REHAB | Facility: HOSPITAL | Age: 78
End: 2018-07-02

## 2018-07-06 ENCOUNTER — APPOINTMENT (OUTPATIENT)
Dept: CARDIAC REHAB | Facility: HOSPITAL | Age: 78
End: 2018-07-06

## 2018-07-09 ENCOUNTER — APPOINTMENT (OUTPATIENT)
Dept: CARDIAC REHAB | Facility: HOSPITAL | Age: 78
End: 2018-07-09

## 2018-07-11 ENCOUNTER — APPOINTMENT (OUTPATIENT)
Dept: CARDIAC REHAB | Facility: HOSPITAL | Age: 78
End: 2018-07-11

## 2018-07-13 ENCOUNTER — APPOINTMENT (OUTPATIENT)
Dept: CARDIAC REHAB | Facility: HOSPITAL | Age: 78
End: 2018-07-13

## 2018-07-16 ENCOUNTER — APPOINTMENT (OUTPATIENT)
Dept: CARDIAC REHAB | Facility: HOSPITAL | Age: 78
End: 2018-07-16

## 2018-07-18 ENCOUNTER — APPOINTMENT (OUTPATIENT)
Dept: CARDIAC REHAB | Facility: HOSPITAL | Age: 78
End: 2018-07-18

## 2018-07-19 DIAGNOSIS — D24.2 BENIGN NEOPLASM OF LEFT BREAST: ICD-10-CM

## 2018-07-19 DIAGNOSIS — N63.0 BREAST NODULE: Primary | ICD-10-CM

## 2018-07-20 ENCOUNTER — APPOINTMENT (OUTPATIENT)
Dept: CARDIAC REHAB | Facility: HOSPITAL | Age: 78
End: 2018-07-20

## 2018-07-23 ENCOUNTER — APPOINTMENT (OUTPATIENT)
Dept: CARDIAC REHAB | Facility: HOSPITAL | Age: 78
End: 2018-07-23

## 2018-07-25 ENCOUNTER — APPOINTMENT (OUTPATIENT)
Dept: CARDIAC REHAB | Facility: HOSPITAL | Age: 78
End: 2018-07-25

## 2018-07-25 RX ORDER — GABAPENTIN 400 MG/1
400 CAPSULE ORAL 3 TIMES DAILY
Qty: 270 CAPSULE | Refills: 2 | Status: SHIPPED | OUTPATIENT
Start: 2018-07-25 | End: 2019-01-30 | Stop reason: SDUPTHER

## 2018-07-26 ENCOUNTER — HOSPITAL ENCOUNTER (OUTPATIENT)
Dept: MAMMOGRAPHY | Facility: HOSPITAL | Age: 78
Discharge: HOME | End: 2018-07-26
Admitting: INTERNAL MEDICINE

## 2018-07-26 DIAGNOSIS — N63.0 BREAST NODULE: ICD-10-CM

## 2018-07-26 DIAGNOSIS — D24.2 BENIGN NEOPLASM OF LEFT BREAST: ICD-10-CM

## 2018-07-26 PROCEDURE — 77066 DX MAMMO INCL CAD BI: CPT

## 2018-07-26 PROCEDURE — G0279 TOMOSYNTHESIS, MAMMO: HCPCS

## 2018-07-27 ENCOUNTER — APPOINTMENT (OUTPATIENT)
Dept: CARDIAC REHAB | Facility: HOSPITAL | Age: 78
End: 2018-07-27

## 2018-07-30 ENCOUNTER — APPOINTMENT (OUTPATIENT)
Dept: CARDIAC REHAB | Facility: HOSPITAL | Age: 78
End: 2018-07-30

## 2018-08-01 ENCOUNTER — APPOINTMENT (OUTPATIENT)
Dept: CARDIAC REHAB | Facility: HOSPITAL | Age: 78
End: 2018-08-01

## 2018-08-03 ENCOUNTER — APPOINTMENT (OUTPATIENT)
Dept: CARDIAC REHAB | Facility: HOSPITAL | Age: 78
End: 2018-08-03

## 2018-08-06 ENCOUNTER — APPOINTMENT (OUTPATIENT)
Dept: CARDIAC REHAB | Facility: HOSPITAL | Age: 78
End: 2018-08-06

## 2018-08-07 LAB
BASOPHILS # BLD AUTO: 0.05 10*3/MM3 (ref 0–0.2)
BASOPHILS NFR BLD AUTO: 0.8 % (ref 0–2.5)
EOSINOPHIL # BLD AUTO: 0.3 10*3/MM3 (ref 0–0.7)
EOSINOPHIL NFR BLD AUTO: 4.9 % (ref 0–7)
ERYTHROCYTE [DISTWIDTH] IN BLOOD BY AUTOMATED COUNT: 12.7 % (ref 11.5–14.5)
FERRITIN SERPL-MCNC: 36.9 NG/ML (ref 17.9–464)
HCT VFR BLD AUTO: 39 % (ref 42–52)
HGB BLD-MCNC: 13 G/DL (ref 14–18)
IMM GRANULOCYTES # BLD: 0.01 10*3/MM3 (ref 0–0.06)
IMM GRANULOCYTES NFR BLD: 0.2 % (ref 0–0.6)
IRON SATN MFR SERPL: 24 % (ref 11–46)
IRON SERPL-MCNC: 75 MCG/DL (ref 37–181)
LYMPHOCYTES # BLD AUTO: 2.26 10*3/MM3 (ref 0.6–3.4)
LYMPHOCYTES NFR BLD AUTO: 36.6 % (ref 10–50)
MCH RBC QN AUTO: 31.1 PG (ref 27–31)
MCHC RBC AUTO-ENTMCNC: 33.3 G/DL (ref 30–37)
MCV RBC AUTO: 93.3 FL (ref 80–94)
MONOCYTES # BLD AUTO: 0.4 10*3/MM3 (ref 0–0.9)
MONOCYTES NFR BLD AUTO: 6.5 % (ref 0–12)
NEUTROPHILS # BLD AUTO: 3.16 10*3/MM3 (ref 2–6.9)
NEUTROPHILS NFR BLD AUTO: 51 % (ref 37–80)
NRBC BLD AUTO-RTO: 0 /100 WBC (ref 0–0)
PLATELET # BLD AUTO: 141 10*3/MM3 (ref 130–400)
RBC # BLD AUTO: 4.18 10*6/MM3 (ref 4.7–6.1)
TIBC SERPL-MCNC: 310 MCG/DL (ref 261–497)
UIBC SERPL-MCNC: 235 MCG/DL
WBC # BLD AUTO: 6.18 10*3/MM3 (ref 4.8–10.8)

## 2018-08-08 ENCOUNTER — APPOINTMENT (OUTPATIENT)
Dept: CARDIAC REHAB | Facility: HOSPITAL | Age: 78
End: 2018-08-08

## 2018-08-09 ENCOUNTER — OFFICE VISIT (OUTPATIENT)
Dept: INTERNAL MEDICINE | Facility: CLINIC | Age: 78
End: 2018-08-09

## 2018-08-09 VITALS
RESPIRATION RATE: 14 BRPM | OXYGEN SATURATION: 97 % | BODY MASS INDEX: 26.77 KG/M2 | HEIGHT: 73 IN | DIASTOLIC BLOOD PRESSURE: 60 MMHG | HEART RATE: 62 BPM | WEIGHT: 202 LBS | TEMPERATURE: 98.2 F | SYSTOLIC BLOOD PRESSURE: 100 MMHG

## 2018-08-09 DIAGNOSIS — I25.119 CORONARY ARTERY DISEASE INVOLVING NATIVE CORONARY ARTERY OF NATIVE HEART WITH ANGINA PECTORIS (HCC): Chronic | ICD-10-CM

## 2018-08-09 DIAGNOSIS — R10.32 ABDOMINAL PAIN, LLQ (LEFT LOWER QUADRANT): ICD-10-CM

## 2018-08-09 DIAGNOSIS — I10 ESSENTIAL HYPERTENSION: ICD-10-CM

## 2018-08-09 DIAGNOSIS — E55.9 VITAMIN D DEFICIENCY: ICD-10-CM

## 2018-08-09 DIAGNOSIS — N62 GYNECOMASTIA: Primary | ICD-10-CM

## 2018-08-09 DIAGNOSIS — K21.9 GASTROESOPHAGEAL REFLUX DISEASE WITHOUT ESOPHAGITIS: ICD-10-CM

## 2018-08-09 DIAGNOSIS — E11.8 TYPE 2 DIABETES MELLITUS WITH COMPLICATION, WITHOUT LONG-TERM CURRENT USE OF INSULIN (HCC): ICD-10-CM

## 2018-08-09 DIAGNOSIS — E78.5 HYPERLIPIDEMIA, UNSPECIFIED HYPERLIPIDEMIA TYPE: ICD-10-CM

## 2018-08-09 DIAGNOSIS — D64.9 ANEMIA, UNSPECIFIED TYPE: ICD-10-CM

## 2018-08-09 PROCEDURE — 99214 OFFICE O/P EST MOD 30 MIN: CPT | Performed by: INTERNAL MEDICINE

## 2018-08-09 NOTE — PROGRESS NOTES
Manju Haines is a 78 y.o. male.     No chief complaint on file.      History of Present Illness   Patient here for follow-up.  Diabetes is stable medication.  Palpitations stable medication.  Coronary artery disease is stable.  Anemia again hemoglobin 13.  GERD stable.  Hyperlipidemia stable medication need to blood tests.  Constipation stable.  Left breast pain still tenderness.  Mammogram ultrasound showed a gynecomastia.    Current Outpatient Prescriptions:   •  albuterol (PROVENTIL HFA;VENTOLIN HFA) 108 (90 Base) MCG/ACT inhaler, Inhale 2 puffs Every 6 (Six) Hours As Needed for Wheezing., Disp: 1 inhaler, Rfl: 1  •  aspirin 81 MG tablet, Take  by mouth., Disp: , Rfl:   •  atorvastatin (LIPITOR) 40 MG tablet, Take 1 tablet by mouth Daily. 200001, Disp: 90 tablet, Rfl: 3  •  betamethasone dipropionate (DIPROLENE) 0.05 % cream, Apply  topically Daily., Disp: 45 g, Rfl: 3  •  carvedilol (COREG) 25 MG tablet, TAKE 1 TABLET BY MOUTH TWO TIMES A DAY , Disp: 180 tablet, Rfl: 0  •  Cholecalciferol (VITAMIN D3) 2000 UNITS tablet, Take  by mouth., Disp: , Rfl:   •  dicyclomine (BENTYL) 20 MG tablet, TAKE 1 TABLET BY MOUTH EVERY EIGHT HOURS AS NEEDED FOR PAIN, Disp: , Rfl: 0  •  gabapentin (NEURONTIN) 400 MG capsule, Take 1 capsule by mouth 3 (Three) Times a Day., Disp: 270 capsule, Rfl: 2  •  glucose blood (ONE TOUCH ULTRA TEST) test strip, Use as instructed to test blood sugar, Disp: 150 each, Rfl: 4  •  Insulin Glargine (TOUJEO SOLOSTAR) 300 UNIT/ML solution pen-injector, Inject 25 Units under the skin into the appropriate area as directed 2 (Two) Times a Day., Disp: 4.5 mL, Rfl: 4  •  Insulin Pen Needle (BD PEN NEEDLE EDDI U/F) 32G X 4 MM misc, PATIENT TESTING 3-4 TIMES A DAY., Disp: 150 each, Rfl: 11  •  Iron, Ferrous Gluconate, 256 (28 Fe) MG tablet, 1 daily po, Disp: 30 tablet, Rfl: 3  •  lansoprazole (PREVACID) 30 MG capsule, Take 1 capsule by mouth Daily., Disp: 90 capsule, Rfl: 3  •  losartan (COZAAR)  100 MG tablet, Take 1 tablet by mouth Daily. 200001, Disp: 90 tablet, Rfl: 3  •  NOVOLOG FLEXPEN 100 UNIT/ML solution pen-injector sc pen, inject 5 units under the skin 2 times per day before meals, Disp: 15 mL, Rfl: 2  •  Probiotic Product (PROBIOTIC DAILY PO), Take  by mouth., Disp: , Rfl:   •  spironolactone (ALDACTONE) 25 MG tablet, Take 1 tablet by mouth Daily., Disp: 90 tablet, Rfl: 3  •  traZODone (DESYREL) 50 MG tablet, TAKE 1 TABLET BY MOUTH AT BEDTIME  (Patient taking differently: TAKE 1 TABLET BY MOUTH AT BEDTIME PRN), Disp: 30 tablet, Rfl: 3    The following portions of the patient's history were reviewed and updated as appropriate: allergies, current medications, past family history, past medical history, past social history, past surgical history and problem list.    Review of Systems   Constitutional: Negative.    Respiratory: Negative.    Cardiovascular: Negative.    Gastrointestinal: Negative.    Musculoskeletal: Negative.    Skin: Negative.    Neurological: Negative.    Psychiatric/Behavioral: Negative.        Objective   Physical Exam   Constitutional: He is oriented to person, place, and time. He appears well-nourished.   Neck: Neck supple.   Cardiovascular: Normal rate, regular rhythm and normal heart sounds.    Pulmonary/Chest: Effort normal and breath sounds normal.   Abdominal: Bowel sounds are normal.   Neurological: He is alert and oriented to person, place, and time.   Skin: Skin is warm.   Psychiatric: He has a normal mood and affect.       All tests have been reviewed.    Assessment/Plan   There are no diagnoses linked to this encounter.          Type 2 diabetes mellitus continue med A1c repeat   Essential hypertension continue med   Radiculopathy, cervical  S/p shots and doing better,   Constipation , fiber probiotic, no diarrhea  lumbar disc surgery, 2004 L4-5.  LBP lumbar radiculopathy. continue Flexeril Mobic, MRI L2/3 mod to severe stenosis. continue follow-up with chiropractor, s/p  ablation  neuropathy,neurotin 400mg bid continue unable to tolerate lyrica  sacroiliac inflammation continue chiro. tylenol arthritis.  edema resolved after d/c amlodipine  Coronary artery disease a status post a CABG pleural effusion follow up chest x-ray continue cardio follow up  Anemia, resolved. Bright red blood per rectum Hemoccult positive m. w/u lab done.  low ferritin sbft if still anemia. s/p colonoscopy3/2017:ileitis and s/p upper endoscopy:esophagitis, Hb low,  Start iron pill do lab--  eczema refill medicine  diarrhea, metformin caused, resolved after d/c metformin  GERD hx of  surgery continue medicine  s/p EGD  Hyperlipidemia cotninue atorvastatin 40 mg do labs  Peripheral neuropathy continue medications  tinnitus, loss of hearing  vision change see eye doctor  leg cramps skin lesions,   hydrocele chronic patient decline to see uro   sleep disturbances, continue trazodone  Td to 2010?, prevnar 13 done, zostavax done, refuse flushot. pneumovax done, shingrix informed  prostate ca s/p removal PSA=0.00, now urine hesitation seen by uro.   Sleep apnea, sleep study negative for sleep apnea  Constipation, fiber and water and dulcolax prn start linzess      CT:gall stone, hydrocele and divertic   CKD watch   Td to HD  Gynecomastia and mamm and us done , do prolactin  Anemia and left lower abd pain refer to GI         3 mo

## 2018-08-10 ENCOUNTER — APPOINTMENT (OUTPATIENT)
Dept: CARDIAC REHAB | Facility: HOSPITAL | Age: 78
End: 2018-08-10

## 2018-08-10 LAB
ALBUMIN SERPL-MCNC: 3.8 G/DL (ref 3.5–5)
ALBUMIN/GLOB SERPL: 1.4 G/DL (ref 1–2)
ALP SERPL-CCNC: 58 U/L (ref 38–126)
ALT SERPL-CCNC: 28 U/L (ref 13–69)
AST SERPL-CCNC: 24 U/L (ref 15–46)
BILIRUB SERPL-MCNC: 0.9 MG/DL (ref 0.2–1.3)
BUN SERPL-MCNC: 15 MG/DL (ref 7–20)
BUN/CREAT SERPL: 11.5 (ref 6.3–21.9)
CALCIUM SERPL-MCNC: 9.6 MG/DL (ref 8.4–10.2)
CHLORIDE SERPL-SCNC: 105 MMOL/L (ref 98–107)
CHOLEST SERPL-MCNC: 116 MG/DL (ref 0–199)
CK SERPL-CCNC: 126 U/L (ref 30–170)
CO2 SERPL-SCNC: 27 MMOL/L (ref 26–30)
CREAT SERPL-MCNC: 1.3 MG/DL (ref 0.6–1.3)
GLOBULIN SER CALC-MCNC: 2.8 GM/DL
GLUCOSE SERPL-MCNC: 94 MG/DL (ref 74–98)
HBA1C MFR BLD: 6.7 %
HDLC SERPL-MCNC: 51 MG/DL (ref 40–60)
LDLC SERPL CALC-MCNC: 46 MG/DL (ref 0–99)
POTASSIUM SERPL-SCNC: 5.5 MMOL/L (ref 3.5–5.1)
PROT SERPL-MCNC: 6.6 G/DL (ref 6.3–8.2)
SODIUM SERPL-SCNC: 140 MMOL/L (ref 137–145)
TRIGL SERPL-MCNC: 93 MG/DL
VLDLC SERPL CALC-MCNC: 18.6 MG/DL

## 2018-08-11 LAB — PROLACTIN SERPL-MCNC: 15.6 NG/ML (ref 4–15.2)

## 2018-08-13 ENCOUNTER — APPOINTMENT (OUTPATIENT)
Dept: CARDIAC REHAB | Facility: HOSPITAL | Age: 78
End: 2018-08-13

## 2018-08-13 DIAGNOSIS — N62 GYNECOMASTIA: Primary | ICD-10-CM

## 2018-08-13 DIAGNOSIS — R79.89 PROLACTIN INCREASED: ICD-10-CM

## 2018-08-14 ENCOUNTER — HOSPITAL ENCOUNTER (OUTPATIENT)
Dept: MRI IMAGING | Facility: HOSPITAL | Age: 78
Discharge: HOME | End: 2018-08-14

## 2018-08-14 ENCOUNTER — HOSPITAL ENCOUNTER (OUTPATIENT)
Dept: GENERAL RADIOLOGY | Facility: HOSPITAL | Age: 78
Discharge: HOME | End: 2018-08-14
Admitting: INTERNAL MEDICINE

## 2018-08-14 DIAGNOSIS — R79.89 PROLACTIN INCREASED: ICD-10-CM

## 2018-08-14 DIAGNOSIS — N62 GYNECOMASTIA: ICD-10-CM

## 2018-08-14 DIAGNOSIS — D64.9 ANEMIA, UNSPECIFIED TYPE: ICD-10-CM

## 2018-08-14 PROCEDURE — 70551 MRI BRAIN STEM W/O DYE: CPT

## 2018-08-14 PROCEDURE — 74250 X-RAY XM SM INT 1CNTRST STD: CPT

## 2018-08-15 ENCOUNTER — APPOINTMENT (OUTPATIENT)
Dept: CARDIAC REHAB | Facility: HOSPITAL | Age: 78
End: 2018-08-15

## 2018-08-17 ENCOUNTER — OFFICE VISIT (OUTPATIENT)
Dept: SURGERY | Facility: CLINIC | Age: 78
End: 2018-08-17

## 2018-08-17 ENCOUNTER — APPOINTMENT (OUTPATIENT)
Dept: CARDIAC REHAB | Facility: HOSPITAL | Age: 78
End: 2018-08-17

## 2018-08-17 VITALS
HEIGHT: 73 IN | OXYGEN SATURATION: 96 % | WEIGHT: 202 LBS | TEMPERATURE: 97.1 F | BODY MASS INDEX: 26.77 KG/M2 | DIASTOLIC BLOOD PRESSURE: 64 MMHG | HEART RATE: 88 BPM | SYSTOLIC BLOOD PRESSURE: 100 MMHG

## 2018-08-17 DIAGNOSIS — R10.31 RIGHT LOWER QUADRANT ABDOMINAL PAIN: ICD-10-CM

## 2018-08-17 DIAGNOSIS — D50.0 IRON DEFICIENCY ANEMIA DUE TO CHRONIC BLOOD LOSS: Primary | ICD-10-CM

## 2018-08-17 PROCEDURE — 99213 OFFICE O/P EST LOW 20 MIN: CPT | Performed by: SURGERY

## 2018-08-17 NOTE — PROGRESS NOTES
Patient: Tristan Haines    YOB: 1940    Date: 08/17/2018    Primary Care Provider: Cayden Chiang MD    No chief complaint on file.      Subjective .     History of present illness: Patient is being seen in the office today for a consultation of evaluation and treatment for ongoing anemia. Patients last hemoglobin was 13.0. Patient complains of diarrhea and constipation intermittently, he uses dulcolax for the constipation which causes him to have diarrhea, and when he doesn't use the dulcolax he experiences constipation. He has LLQ stabbing pain that's non radiating and worsens with using the bathroom. He denies any nausea or vomiting. Patient had a [place] small bowel follow through on 8/14/2018. Patient does not remember his last EGD. His last colonoscopy was 4/4/2017.  Patient had a lap Nissen fundoplication in the past, no EGD since that time which was 30 years ago.    The following portions of the patient's history were reviewed and updated as appropriate: allergies, current medications, past family history, past medical history, past social history, past surgical history and problem list.      Review of Systems   Constitutional: Negative for chills, fever and unexpected weight change.   HENT: Negative for trouble swallowing and voice change.    Eyes: Negative for visual disturbance.   Respiratory: Negative for apnea, cough, chest tightness, shortness of breath and wheezing.    Cardiovascular: Negative for chest pain, palpitations and leg swelling.   Gastrointestinal: Negative for abdominal distention, abdominal pain, anal bleeding, blood in stool, constipation, diarrhea, nausea, rectal pain and vomiting.   Endocrine: Negative for cold intolerance and heat intolerance.   Genitourinary: Negative for difficulty urinating, dysuria, flank pain, scrotal swelling and testicular pain.   Musculoskeletal: Negative for back pain, gait problem and joint swelling.   Skin: Negative for color change, rash and wound.    Neurological: Negative for dizziness, syncope, speech difficulty, weakness, numbness and headaches.   Hematological: Negative for adenopathy. Does not bruise/bleed easily.   Psychiatric/Behavioral: Negative for confusion. The patient is not nervous/anxious.        Allergies:  Allergies   Allergen Reactions   • Ciprofloxacin    • Mesalamine    • Metronidazole        Medications:    Current Outpatient Prescriptions:   •  albuterol (PROVENTIL HFA;VENTOLIN HFA) 108 (90 Base) MCG/ACT inhaler, Inhale 2 puffs Every 6 (Six) Hours As Needed for Wheezing., Disp: 1 inhaler, Rfl: 1  •  aspirin 81 MG tablet, Take  by mouth., Disp: , Rfl:   •  atorvastatin (LIPITOR) 40 MG tablet, Take 1 tablet by mouth Daily. 200001, Disp: 90 tablet, Rfl: 3  •  betamethasone dipropionate (DIPROLENE) 0.05 % cream, Apply  topically Daily., Disp: 45 g, Rfl: 3  •  carvedilol (COREG) 25 MG tablet, TAKE 1 TABLET BY MOUTH TWO TIMES A DAY , Disp: 180 tablet, Rfl: 0  •  Cholecalciferol (VITAMIN D3) 2000 UNITS tablet, Take  by mouth., Disp: , Rfl:   •  dicyclomine (BENTYL) 20 MG tablet, TAKE 1 TABLET BY MOUTH EVERY EIGHT HOURS AS NEEDED FOR PAIN, Disp: , Rfl: 0  •  gabapentin (NEURONTIN) 400 MG capsule, Take 1 capsule by mouth 3 (Three) Times a Day., Disp: 270 capsule, Rfl: 2  •  glucose blood (ONE TOUCH ULTRA TEST) test strip, Use as instructed to test blood sugar, Disp: 150 each, Rfl: 4  •  Insulin Glargine (TOUJEO SOLOSTAR) 300 UNIT/ML solution pen-injector, Inject 25 Units under the skin into the appropriate area as directed 2 (Two) Times a Day., Disp: 4.5 mL, Rfl: 4  •  Insulin Pen Needle (BD PEN NEEDLE EDDI U/F) 32G X 4 MM misc, PATIENT TESTING 3-4 TIMES A DAY., Disp: 150 each, Rfl: 11  •  Iron, Ferrous Gluconate, 256 (28 Fe) MG tablet, 1 daily po, Disp: 30 tablet, Rfl: 3  •  lansoprazole (PREVACID) 30 MG capsule, Take 1 capsule by mouth Daily., Disp: 90 capsule, Rfl: 3  •  linaclotide (LINZESS) 72 MCG capsule capsule, Take 1 capsule by mouth Every  "Morning Before Breakfast., Disp: 30 capsule, Rfl: 3  •  losartan (COZAAR) 100 MG tablet, Take 1 tablet by mouth Daily. 200001, Disp: 90 tablet, Rfl: 3  •  NOVOLOG FLEXPEN 100 UNIT/ML solution pen-injector sc pen, inject 5 units under the skin 2 times per day before meals, Disp: 15 mL, Rfl: 2  •  Probiotic Product (PROBIOTIC DAILY PO), Take  by mouth., Disp: , Rfl:   •  spironolactone (ALDACTONE) 25 MG tablet, Take 1 tablet by mouth Daily., Disp: 90 tablet, Rfl: 3  •  traZODone (DESYREL) 50 MG tablet, TAKE 1 TABLET BY MOUTH AT BEDTIME  (Patient taking differently: TAKE 1 TABLET BY MOUTH AT BEDTIME PRN), Disp: 30 tablet, Rfl: 3    History\"  Past Medical History:   Diagnosis Date   • Abdominal pain     Hx of.   • Atypical chest pain     Hx of.   • Colon polyps    • Diabetes mellitus (CMS/HCC)    • Diarrhea     diarrhea, metformin caused, resolved after d/c metformin   • Eczema     Requesting ointment   • Edema of leg    • Hernia     heietal hernia   • Leg cramps    • Neck pain    • Nephrolithiasis    • Prostate cancer (CMS/HCC)    • Skin lesions    • SOB (shortness of breath)    • Tuberculin skin test positive    • Vitamin D deficiency        Past Surgical History:   Procedure Laterality Date   • COLONOSCOPY      2017-[facility]   • CORONARY ARTERY BYPASS GRAFT     • INGUINAL HERNIA REPAIR  1989    Hiatal hernia   • PROSTATE SURGERY      Prostate cancer s/p removal do PSA   • SMALL INTESTINE SURGERY         Family History   Problem Relation Age of Onset   • Cancer Mother    • Diabetes Father    • Hypertension Father    • Liver disease Father    • Cancer Sister    • Diabetes Brother    • Heart attack Other         grandparent   • Arthritis Other    • Heart attack Other        Social History   Substance Use Topics   • Smoking status: Never Smoker   • Smokeless tobacco: Not on file   • Alcohol use No        Objective     Vital Signs:   Vitals:    08/17/18 1303   BP: 100/64   Pulse: 88   Temp: 97.1 °F (36.2 °C)   SpO2: 96% " "  Weight: 91.6 kg (202 lb)   Height: 185.4 cm (73\")       Physical Exam:   General Appearance:    Alert, cooperative, in no acute distress   Head:    Normocephalic, without obvious abnormality, atraumatic   Eyes:            Lids and lashes normal, conjunctivae and sclerae normal, no   icterus, no pallor, corneas clear, PERRLA   Ears:    Ears appear intact with no abnormalities noted   Throat:   No oral lesions, no thrush, oral mucosa moist   Neck:   No adenopathy, supple, trachea midline, no thyromegaly, no   carotid bruit, no JVD   Lungs:     Clear to auscultation,respirations regular, even and                  unlabored    Heart:    Regular rhythm and normal rate, normal S1 and S2, no            murmur, no gallop, no rub, no click   Chest Wall:    No abnormalities observed   Abdomen:     Normal bowel sounds, no masses, no organomegaly, soft        non-tender, non-distended, no guarding, no rebound                tenderness   Extremities:   Moves all extremities well, no edema, no cyanosis, no             redness   Pulses:   Pulses palpable and equal bilaterally   Skin:   No bleeding, bruising or rash   Lymph nodes:   No palpable adenopathy   Neurologic:   Cranial nerves 2 - 12 grossly intact, sensation intact, DTR       present and equal bilaterally     Results Review:   I reviewed the patient's new clinical results.    Assessment/Plan     1. Iron deficiency anemia due to chronic blood loss    2. Right lower quadrant abdominal pain        Patient scheduled for EGD with biopsy to evaluate the duodenum and stomach for possible causes of anemia.  Risk of bleeding and perforation discussed and patient agreeable.    I discussed the patients findings and my recommendations with patient    Review of Systems was reviewed and confirmed as accurate today.    Electronically signed by Vicky Perez MD  08/17/18      .      Portions of this note have been scribed for Vicky Perez MD by Amy Henry MA 8/17/2018  " 1:15 PM

## 2018-08-20 ENCOUNTER — APPOINTMENT (OUTPATIENT)
Dept: CARDIAC REHAB | Facility: HOSPITAL | Age: 78
End: 2018-08-20

## 2018-08-20 PROBLEM — R10.31 RIGHT LOWER QUADRANT ABDOMINAL PAIN: Status: ACTIVE | Noted: 2018-08-20

## 2018-08-20 PROBLEM — D50.0 IRON DEFICIENCY ANEMIA DUE TO CHRONIC BLOOD LOSS: Status: ACTIVE | Noted: 2018-08-20

## 2018-08-22 ENCOUNTER — APPOINTMENT (OUTPATIENT)
Dept: CARDIAC REHAB | Facility: HOSPITAL | Age: 78
End: 2018-08-22

## 2018-08-24 ENCOUNTER — APPOINTMENT (OUTPATIENT)
Dept: CARDIAC REHAB | Facility: HOSPITAL | Age: 78
End: 2018-08-24

## 2018-08-24 DIAGNOSIS — R79.89 PROLACTIN INCREASED: Primary | ICD-10-CM

## 2018-08-27 ENCOUNTER — APPOINTMENT (OUTPATIENT)
Dept: CARDIAC REHAB | Facility: HOSPITAL | Age: 78
End: 2018-08-27

## 2018-08-29 ENCOUNTER — APPOINTMENT (OUTPATIENT)
Dept: CARDIAC REHAB | Facility: HOSPITAL | Age: 78
End: 2018-08-29

## 2018-08-29 ENCOUNTER — RESULTS ENCOUNTER (OUTPATIENT)
Dept: INTERNAL MEDICINE | Facility: CLINIC | Age: 78
End: 2018-08-29

## 2018-08-29 DIAGNOSIS — R79.89 PROLACTIN INCREASED: ICD-10-CM

## 2018-08-30 ENCOUNTER — TELEPHONE (OUTPATIENT)
Dept: SURGERY | Facility: CLINIC | Age: 78
End: 2018-08-30

## 2018-08-30 ENCOUNTER — ANESTHESIA EVENT (OUTPATIENT)
Dept: GASTROENTEROLOGY | Facility: HOSPITAL | Age: 78
End: 2018-08-30

## 2018-08-30 LAB
CORTIS AM PEAK SERPL-MCNC: 11.3 UG/DL (ref 6.2–19.4)
ESTRADIOL SERPL-MCNC: 33.9 PG/ML (ref 7.6–42.6)
FSH SERPL-ACNC: 7.8 MIU/ML (ref 1.5–12.4)
IGF-I SERPL-MCNC: 131 NG/ML (ref 37–172)
LH SERPL-ACNC: 8.4 MIU/ML (ref 1.7–8.6)
T4 FREE SERPL-MCNC: 0.93 NG/DL (ref 0.78–2.19)
TESTOST SERPL-MCNC: 499 NG/DL (ref 264–916)
TSH SERPL DL<=0.005 MIU/L-ACNC: 2.11 MIU/ML (ref 0.47–4.68)

## 2018-08-31 ENCOUNTER — ANESTHESIA (OUTPATIENT)
Dept: GASTROENTEROLOGY | Facility: HOSPITAL | Age: 78
End: 2018-08-31

## 2018-08-31 ENCOUNTER — HOSPITAL ENCOUNTER (OUTPATIENT)
Facility: HOSPITAL | Age: 78
Setting detail: HOSPITAL OUTPATIENT SURGERY
Discharge: HOME | End: 2018-08-31
Attending: SURGERY | Admitting: SURGERY

## 2018-08-31 ENCOUNTER — APPOINTMENT (OUTPATIENT)
Dept: CARDIAC REHAB | Facility: HOSPITAL | Age: 78
End: 2018-08-31

## 2018-08-31 VITALS
DIASTOLIC BLOOD PRESSURE: 87 MMHG | BODY MASS INDEX: 26.77 KG/M2 | HEART RATE: 62 BPM | HEIGHT: 73 IN | WEIGHT: 202 LBS | TEMPERATURE: 96.8 F | RESPIRATION RATE: 18 BRPM | SYSTOLIC BLOOD PRESSURE: 135 MMHG | OXYGEN SATURATION: 95 %

## 2018-08-31 DIAGNOSIS — R10.31 RIGHT LOWER QUADRANT ABDOMINAL PAIN: ICD-10-CM

## 2018-08-31 DIAGNOSIS — D50.0 IRON DEFICIENCY ANEMIA DUE TO CHRONIC BLOOD LOSS: ICD-10-CM

## 2018-08-31 LAB — GLUCOSE BLDC GLUCOMTR-MCNC: 102 MG/DL (ref 70–130)

## 2018-08-31 PROCEDURE — 88305 TISSUE EXAM BY PATHOLOGIST: CPT | Performed by: SURGERY

## 2018-08-31 PROCEDURE — 25010000002 PROPOFOL 200 MG/20ML EMULSION: Performed by: NURSE ANESTHETIST, CERTIFIED REGISTERED

## 2018-08-31 PROCEDURE — 82962 GLUCOSE BLOOD TEST: CPT

## 2018-08-31 RX ORDER — SODIUM CHLORIDE, SODIUM LACTATE, POTASSIUM CHLORIDE, CALCIUM CHLORIDE 600; 310; 30; 20 MG/100ML; MG/100ML; MG/100ML; MG/100ML
1000 INJECTION, SOLUTION INTRAVENOUS CONTINUOUS
Status: DISCONTINUED | OUTPATIENT
Start: 2018-08-31 | End: 2018-08-31 | Stop reason: HOSPADM

## 2018-08-31 RX ORDER — PROPOFOL 10 MG/ML
INJECTION, EMULSION INTRAVENOUS AS NEEDED
Status: DISCONTINUED | OUTPATIENT
Start: 2018-08-31 | End: 2018-08-31 | Stop reason: SURG

## 2018-08-31 RX ORDER — ONDANSETRON 2 MG/ML
4 INJECTION INTRAMUSCULAR; INTRAVENOUS ONCE AS NEEDED
Status: DISCONTINUED | OUTPATIENT
Start: 2018-08-31 | End: 2018-08-31 | Stop reason: HOSPADM

## 2018-08-31 RX ADMIN — PROPOFOL 50 MG: 10 INJECTION, EMULSION INTRAVENOUS at 08:58

## 2018-08-31 RX ADMIN — SODIUM CHLORIDE, POTASSIUM CHLORIDE, SODIUM LACTATE AND CALCIUM CHLORIDE 1000 ML: 600; 310; 30; 20 INJECTION, SOLUTION INTRAVENOUS at 08:07

## 2018-08-31 RX ADMIN — LIDOCAINE HYDROCHLORIDE 50 MG: 20 INJECTION, SOLUTION INTRAVENOUS at 08:58

## 2018-09-05 ENCOUNTER — APPOINTMENT (OUTPATIENT)
Dept: CARDIAC REHAB | Facility: HOSPITAL | Age: 78
End: 2018-09-05

## 2018-09-06 LAB
LAB AP CASE REPORT: NORMAL
PATH REPORT.FINAL DX SPEC: NORMAL

## 2018-09-07 ENCOUNTER — APPOINTMENT (OUTPATIENT)
Dept: CARDIAC REHAB | Facility: HOSPITAL | Age: 78
End: 2018-09-07

## 2018-09-10 ENCOUNTER — APPOINTMENT (OUTPATIENT)
Dept: CARDIAC REHAB | Facility: HOSPITAL | Age: 78
End: 2018-09-10

## 2018-09-12 ENCOUNTER — APPOINTMENT (OUTPATIENT)
Dept: CARDIAC REHAB | Facility: HOSPITAL | Age: 78
End: 2018-09-12

## 2018-09-14 ENCOUNTER — APPOINTMENT (OUTPATIENT)
Dept: CARDIAC REHAB | Facility: HOSPITAL | Age: 78
End: 2018-09-14

## 2018-09-14 RX ORDER — CARVEDILOL 25 MG/1
25 TABLET ORAL 2 TIMES DAILY
Qty: 180 TABLET | Refills: 0 | Status: SHIPPED | OUTPATIENT
Start: 2018-09-14 | End: 2018-11-19 | Stop reason: SDUPTHER

## 2018-09-17 ENCOUNTER — APPOINTMENT (OUTPATIENT)
Dept: CARDIAC REHAB | Facility: HOSPITAL | Age: 78
End: 2018-09-17

## 2018-09-19 ENCOUNTER — APPOINTMENT (OUTPATIENT)
Dept: CARDIAC REHAB | Facility: HOSPITAL | Age: 78
End: 2018-09-19

## 2018-09-21 ENCOUNTER — APPOINTMENT (OUTPATIENT)
Dept: CARDIAC REHAB | Facility: HOSPITAL | Age: 78
End: 2018-09-21

## 2018-09-24 ENCOUNTER — APPOINTMENT (OUTPATIENT)
Dept: CARDIAC REHAB | Facility: HOSPITAL | Age: 78
End: 2018-09-24

## 2018-09-26 ENCOUNTER — APPOINTMENT (OUTPATIENT)
Dept: CARDIAC REHAB | Facility: HOSPITAL | Age: 78
End: 2018-09-26

## 2018-09-28 ENCOUNTER — APPOINTMENT (OUTPATIENT)
Dept: CARDIAC REHAB | Facility: HOSPITAL | Age: 78
End: 2018-09-28

## 2018-10-01 ENCOUNTER — APPOINTMENT (OUTPATIENT)
Dept: CARDIAC REHAB | Facility: HOSPITAL | Age: 78
End: 2018-10-01

## 2018-10-03 ENCOUNTER — APPOINTMENT (OUTPATIENT)
Dept: CARDIAC REHAB | Facility: HOSPITAL | Age: 78
End: 2018-10-03

## 2018-10-05 ENCOUNTER — APPOINTMENT (OUTPATIENT)
Dept: CARDIAC REHAB | Facility: HOSPITAL | Age: 78
End: 2018-10-05

## 2018-10-08 ENCOUNTER — APPOINTMENT (OUTPATIENT)
Dept: CARDIAC REHAB | Facility: HOSPITAL | Age: 78
End: 2018-10-08

## 2018-10-10 ENCOUNTER — APPOINTMENT (OUTPATIENT)
Dept: CARDIAC REHAB | Facility: HOSPITAL | Age: 78
End: 2018-10-10

## 2018-10-11 ENCOUNTER — EPISODE CHANGES (OUTPATIENT)
Dept: CASE MANAGEMENT | Facility: OTHER | Age: 78
End: 2018-10-11

## 2018-10-12 ENCOUNTER — APPOINTMENT (OUTPATIENT)
Dept: CARDIAC REHAB | Facility: HOSPITAL | Age: 78
End: 2018-10-12

## 2018-10-15 ENCOUNTER — APPOINTMENT (OUTPATIENT)
Dept: CARDIAC REHAB | Facility: HOSPITAL | Age: 78
End: 2018-10-15

## 2018-10-17 ENCOUNTER — APPOINTMENT (OUTPATIENT)
Dept: CARDIAC REHAB | Facility: HOSPITAL | Age: 78
End: 2018-10-17

## 2018-10-19 ENCOUNTER — APPOINTMENT (OUTPATIENT)
Dept: CARDIAC REHAB | Facility: HOSPITAL | Age: 78
End: 2018-10-19

## 2018-10-22 ENCOUNTER — APPOINTMENT (OUTPATIENT)
Dept: CARDIAC REHAB | Facility: HOSPITAL | Age: 78
End: 2018-10-22

## 2018-10-24 ENCOUNTER — APPOINTMENT (OUTPATIENT)
Dept: CARDIAC REHAB | Facility: HOSPITAL | Age: 78
End: 2018-10-24

## 2018-10-26 ENCOUNTER — APPOINTMENT (OUTPATIENT)
Dept: CARDIAC REHAB | Facility: HOSPITAL | Age: 78
End: 2018-10-26

## 2018-10-29 ENCOUNTER — APPOINTMENT (OUTPATIENT)
Dept: CARDIAC REHAB | Facility: HOSPITAL | Age: 78
End: 2018-10-29

## 2018-10-31 ENCOUNTER — APPOINTMENT (OUTPATIENT)
Dept: CARDIAC REHAB | Facility: HOSPITAL | Age: 78
End: 2018-10-31

## 2018-11-02 ENCOUNTER — APPOINTMENT (OUTPATIENT)
Dept: CARDIAC REHAB | Facility: HOSPITAL | Age: 78
End: 2018-11-02

## 2018-11-05 ENCOUNTER — APPOINTMENT (OUTPATIENT)
Dept: CARDIAC REHAB | Facility: HOSPITAL | Age: 78
End: 2018-11-05

## 2018-11-05 ENCOUNTER — OFFICE VISIT (OUTPATIENT)
Dept: INTERNAL MEDICINE | Facility: CLINIC | Age: 78
End: 2018-11-05

## 2018-11-05 VITALS
OXYGEN SATURATION: 94 % | HEIGHT: 73 IN | SYSTOLIC BLOOD PRESSURE: 130 MMHG | HEART RATE: 86 BPM | DIASTOLIC BLOOD PRESSURE: 80 MMHG

## 2018-11-05 DIAGNOSIS — G89.29 CHRONIC LOW BACK PAIN, UNSPECIFIED BACK PAIN LATERALITY, WITH SCIATICA PRESENCE UNSPECIFIED: ICD-10-CM

## 2018-11-05 DIAGNOSIS — K21.9 GASTROESOPHAGEAL REFLUX DISEASE WITHOUT ESOPHAGITIS: ICD-10-CM

## 2018-11-05 DIAGNOSIS — I10 ESSENTIAL HYPERTENSION: ICD-10-CM

## 2018-11-05 DIAGNOSIS — E55.9 VITAMIN D DEFICIENCY: ICD-10-CM

## 2018-11-05 DIAGNOSIS — D50.0 IRON DEFICIENCY ANEMIA DUE TO CHRONIC BLOOD LOSS: ICD-10-CM

## 2018-11-05 DIAGNOSIS — M54.5 CHRONIC LOW BACK PAIN, UNSPECIFIED BACK PAIN LATERALITY, WITH SCIATICA PRESENCE UNSPECIFIED: ICD-10-CM

## 2018-11-05 DIAGNOSIS — C61 MALIGNANT NEOPLASM OF PROSTATE (HCC): ICD-10-CM

## 2018-11-05 DIAGNOSIS — E87.5 HYPERKALEMIA: ICD-10-CM

## 2018-11-05 DIAGNOSIS — I25.119 CORONARY ARTERY DISEASE INVOLVING NATIVE CORONARY ARTERY OF NATIVE HEART WITH ANGINA PECTORIS (HCC): Primary | Chronic | ICD-10-CM

## 2018-11-05 DIAGNOSIS — E78.5 HYPERLIPIDEMIA, UNSPECIFIED HYPERLIPIDEMIA TYPE: ICD-10-CM

## 2018-11-05 DIAGNOSIS — E11.8 TYPE 2 DIABETES MELLITUS WITH COMPLICATION, WITHOUT LONG-TERM CURRENT USE OF INSULIN (HCC): ICD-10-CM

## 2018-11-05 PROBLEM — R10.31 RIGHT LOWER QUADRANT ABDOMINAL PAIN: Status: RESOLVED | Noted: 2018-08-20 | Resolved: 2018-11-05

## 2018-11-05 PROBLEM — R91.8 PULMONARY INFILTRATE: Status: RESOLVED | Noted: 2017-10-12 | Resolved: 2018-11-05

## 2018-11-05 PROCEDURE — 99214 OFFICE O/P EST MOD 30 MIN: CPT | Performed by: INTERNAL MEDICINE

## 2018-11-05 NOTE — PROGRESS NOTES
Subjective   Tristan Haines is a 78 y.o. male.     Chief Complaint   Patient presents with   • Follow-up       History of Present Illness   Patient here for follow-up.  Diabetes is stable medication.  Hypertension stable medication.  Anemia hemoglobin slightly low workup unremarkable recent upper endoscopy by surgeon who thought anemia may be related to esophagitis.  A patient was put on PPI.  Hyperlipidemia stable medication.  Still mild constipation patient yet to start medication.  Gynecomastia workup or negative mild soreness in the breasts right side more than left side.    Current Outpatient Prescriptions:   •  albuterol (PROVENTIL HFA;VENTOLIN HFA) 108 (90 Base) MCG/ACT inhaler, Inhale 2 puffs Every 6 (Six) Hours As Needed for Wheezing., Disp: 1 inhaler, Rfl: 1  •  aspirin 81 MG tablet, Take 81 mg by mouth Daily., Disp: , Rfl:   •  atorvastatin (LIPITOR) 40 MG tablet, Take 1 tablet by mouth Daily. 200001, Disp: 90 tablet, Rfl: 3  •  betamethasone dipropionate (DIPROLENE) 0.05 % cream, Apply  topically Daily. (Patient taking differently: Apply 1 application topically to the appropriate area as directed As Needed.), Disp: 45 g, Rfl: 3  •  carvedilol (COREG) 25 MG tablet, Take 1 tablet by mouth 2 (Two) Times a Day., Disp: 180 tablet, Rfl: 0  •  Cholecalciferol (VITAMIN D3) 2000 UNITS tablet, Take  by mouth Daily., Disp: , Rfl:   •  dicyclomine (BENTYL) 20 MG tablet, TAKE 1 TABLET BY MOUTH EVERY EIGHT HOURS AS NEEDED FOR PAIN, Disp: , Rfl: 0  •  gabapentin (NEURONTIN) 400 MG capsule, Take 1 capsule by mouth 3 (Three) Times a Day., Disp: 270 capsule, Rfl: 2  •  glucose blood (ONE TOUCH ULTRA TEST) test strip, Use as instructed to test blood sugar, Disp: 150 each, Rfl: 4  •  Insulin Glargine (TOUJEO SOLOSTAR) 300 UNIT/ML solution pen-injector, Inject 25 Units under the skin into the appropriate area as directed 2 (Two) Times a Day. (Patient taking differently: Inject 24 Units under the skin into the appropriate area  as directed 2 (Two) Times a Day.), Disp: 4.5 mL, Rfl: 4  •  Insulin Pen Needle (BD PEN NEEDLE EDDI U/F) 32G X 4 MM misc, PATIENT TESTING 3-4 TIMES A DAY., Disp: 150 each, Rfl: 11  •  Iron, Ferrous Gluconate, 256 (28 Fe) MG tablet, 1 daily po (Patient taking differently: Take  by mouth Daily. 1 daily po), Disp: 30 tablet, Rfl: 3  •  lansoprazole (PREVACID) 30 MG capsule, Take 1 capsule by mouth Daily., Disp: 90 capsule, Rfl: 3  •  linaclotide (LINZESS) 72 MCG capsule capsule, Take 1 capsule by mouth Every Morning Before Breakfast., Disp: 30 capsule, Rfl: 3  •  losartan (COZAAR) 100 MG tablet, Take 1 tablet by mouth Daily. 200001, Disp: 90 tablet, Rfl: 3  •  NOVOLOG FLEXPEN 100 UNIT/ML solution pen-injector sc pen, inject 5 units under the skin 2 times per day before meals (Patient taking differently: PATIENT DOES 6 UNITS BEFORE MEALS 2 TIMES PER DAY), Disp: 15 mL, Rfl: 2  •  Probiotic Product (PROBIOTIC DAILY PO), Take 1 tablet by mouth Daily., Disp: , Rfl:   •  spironolactone (ALDACTONE) 25 MG tablet, Take 1 tablet by mouth Daily. (Patient taking differently: Take 6.25 mg by mouth 2 (Two) Times a Day.), Disp: 90 tablet, Rfl: 3  •  traZODone (DESYREL) 50 MG tablet, TAKE 1 TABLET BY MOUTH AT BEDTIME  (Patient taking differently: TAKES ONE QUARTER TABLET BY MOUTH AT BEDTIME PRN), Disp: 30 tablet, Rfl: 3    The following portions of the patient's history were reviewed and updated as appropriate: allergies, current medications, past family history, past medical history, past social history, past surgical history and problem list.    Review of Systems   Constitutional: Negative.    Respiratory: Negative.    Cardiovascular: Negative.    Gastrointestinal: Negative.    Musculoskeletal: Positive for back pain.   Skin: Negative.    Neurological: Negative.    Psychiatric/Behavioral: Negative.        Objective   Physical Exam   Constitutional: He is oriented to person, place, and time. He appears well-nourished.   Neck: Neck  supple.   Cardiovascular: Normal rate, regular rhythm and normal heart sounds.    Pulmonary/Chest: Effort normal and breath sounds normal.   Abdominal: Bowel sounds are normal.   Musculoskeletal: He exhibits tenderness.   Neurological: He is alert and oriented to person, place, and time.   Skin: Skin is warm.   Psychiatric: He has a normal mood and affect.       All tests have been reviewed.    Assessment/Plan   There are no diagnoses linked to this encounter.           Type 2 diabetes mellitus continue med    Essential hypertension continue med   Radiculopathy, cervical  S/p shots and doing better,   Constipation , fiber probiotic, no diarrhea  lumbar disc surgery, 2004 L4-5.  LBP lumbar radiculopathy. continue Flexeril Mobic, MRI L2/3 mod to severe stenosis. continue follow-up with chiropractor, s/p ablation  neuropathy,neurotin 400mg bid continue unable to tolerate lyrica  sacroiliac inflammation continue chiro. tylenol arthritis.  edema resolved after d/c amlodipine  Coronary artery disease a status post a CABG pleural effusion follow up chest x-ray continue cardio follow up  Anemia, stable  Bright red blood per rectum Hemoccult positive m. w/u lab done.  low ferritin sbft if still anemia. s/p colonoscopy3/2017:ileitis and s/p upper endoscopy:esophagitis, Hb low,  cont iron pill-  eczema refill medicine  diarrhea, metformin caused, resolved after d/c metformin  GERD hx of HH surgery continue medicine  s/p EGD  Hyperlipidemia cotninue atorvastatin 40 mg   Peripheral neuropathy continue medications  tinnitus, loss of hearing  vision change see eye doctor  leg cramps skin lesions,   hydrocele chronic patient decline to see uro   sleep disturbances, continue trazodone  Td to 2010?, prevnar 13 done, zostavax done, refuse flushot. pneumovax done, shingrix informed  prostate ca s/p removal PSA=0.00, now urine hesitation seen by uro.   Sleep apnea, sleep study negative for sleep apnea  Constipation, fiber and water,  start linzess--      CT:gall stone, hydrocele and divertic       CKD watch       Td to HD      Gynecomastia and mamm and us done , prolactin slight high, other hormones normal. Watch for now     Anemia ? Due to gastritis per dr solorio continue PPI      High potassium repeat today      3 mo wellness after labs

## 2018-11-07 ENCOUNTER — APPOINTMENT (OUTPATIENT)
Dept: CARDIAC REHAB | Facility: HOSPITAL | Age: 78
End: 2018-11-07

## 2018-11-09 ENCOUNTER — APPOINTMENT (OUTPATIENT)
Dept: CARDIAC REHAB | Facility: HOSPITAL | Age: 78
End: 2018-11-09

## 2018-11-12 ENCOUNTER — APPOINTMENT (OUTPATIENT)
Dept: CARDIAC REHAB | Facility: HOSPITAL | Age: 78
End: 2018-11-12

## 2018-11-14 ENCOUNTER — APPOINTMENT (OUTPATIENT)
Dept: CARDIAC REHAB | Facility: HOSPITAL | Age: 78
End: 2018-11-14

## 2018-11-16 ENCOUNTER — APPOINTMENT (OUTPATIENT)
Dept: CARDIAC REHAB | Facility: HOSPITAL | Age: 78
End: 2018-11-16

## 2018-11-19 ENCOUNTER — APPOINTMENT (OUTPATIENT)
Dept: CARDIAC REHAB | Facility: HOSPITAL | Age: 78
End: 2018-11-19

## 2018-11-19 RX ORDER — CARVEDILOL 25 MG/1
TABLET ORAL
Qty: 180 TABLET | Refills: 0 | Status: SHIPPED | OUTPATIENT
Start: 2018-11-19 | End: 2019-02-16 | Stop reason: SDUPTHER

## 2018-11-21 ENCOUNTER — APPOINTMENT (OUTPATIENT)
Dept: CARDIAC REHAB | Facility: HOSPITAL | Age: 78
End: 2018-11-21

## 2018-11-23 ENCOUNTER — APPOINTMENT (OUTPATIENT)
Dept: CARDIAC REHAB | Facility: HOSPITAL | Age: 78
End: 2018-11-23

## 2018-11-26 ENCOUNTER — APPOINTMENT (OUTPATIENT)
Dept: CARDIAC REHAB | Facility: HOSPITAL | Age: 78
End: 2018-11-26

## 2018-11-28 ENCOUNTER — APPOINTMENT (OUTPATIENT)
Dept: CARDIAC REHAB | Facility: HOSPITAL | Age: 78
End: 2018-11-28

## 2018-11-30 ENCOUNTER — APPOINTMENT (OUTPATIENT)
Dept: CARDIAC REHAB | Facility: HOSPITAL | Age: 78
End: 2018-11-30

## 2018-12-03 ENCOUNTER — APPOINTMENT (OUTPATIENT)
Dept: CARDIAC REHAB | Facility: HOSPITAL | Age: 78
End: 2018-12-03

## 2018-12-05 ENCOUNTER — APPOINTMENT (OUTPATIENT)
Dept: CARDIAC REHAB | Facility: HOSPITAL | Age: 78
End: 2018-12-05

## 2018-12-07 ENCOUNTER — APPOINTMENT (OUTPATIENT)
Dept: CARDIAC REHAB | Facility: HOSPITAL | Age: 78
End: 2018-12-07

## 2019-01-11 RX ORDER — INSULIN GLARGINE 300 U/ML
INJECTION, SOLUTION SUBCUTANEOUS
Qty: 4.5 ML | Refills: 3 | Status: SHIPPED | OUTPATIENT
Start: 2019-01-11 | End: 2019-05-18 | Stop reason: SDUPTHER

## 2019-01-18 RX ORDER — NITROGLYCERIN 0.4 MG/1
0.4 TABLET SUBLINGUAL
Qty: 50 TABLET | Refills: 12 | Status: SHIPPED | OUTPATIENT
Start: 2019-01-18 | End: 2021-03-30 | Stop reason: SDUPTHER

## 2019-01-18 NOTE — TELEPHONE ENCOUNTER
Patient is out of test strips and would like to have them ordered today, if possible.  Patient also requested that Dr. Chiang order some Nitroglycerin tablets. He states that he had taken this previously, but would like to have them ordered in the event he may need them in the future, due to hx of cardiac issues.

## 2019-01-19 LAB
25(OH)D3+25(OH)D2 SERPL-MCNC: 33.4 NG/ML (ref 30–100)
ALBUMIN SERPL-MCNC: 4.1 G/DL (ref 3.5–4.8)
ALBUMIN/GLOB SERPL: 1.6 {RATIO} (ref 1.2–2.2)
ALP SERPL-CCNC: 54 IU/L (ref 39–117)
ALT SERPL-CCNC: 12 IU/L (ref 0–44)
APPEARANCE UR: CLEAR
AST SERPL-CCNC: 24 IU/L (ref 0–40)
BASOPHILS # BLD AUTO: 0 X10E3/UL (ref 0–0.2)
BASOPHILS NFR BLD AUTO: 0 %
BILIRUB SERPL-MCNC: 0.5 MG/DL (ref 0–1.2)
BILIRUB UR QL STRIP: NEGATIVE
BUN SERPL-MCNC: 13 MG/DL (ref 8–27)
BUN/CREAT SERPL: 9 (ref 10–24)
CALCIUM SERPL-MCNC: 8.9 MG/DL (ref 8.6–10.2)
CHLORIDE SERPL-SCNC: 103 MMOL/L (ref 96–106)
CHOLEST SERPL-MCNC: 139 MG/DL (ref 100–199)
CK SERPL-CCNC: 253 U/L (ref 24–204)
CO2 SERPL-SCNC: 20 MMOL/L (ref 20–29)
COLOR UR: YELLOW
CREAT SERPL-MCNC: 1.41 MG/DL (ref 0.76–1.27)
EOSINOPHIL # BLD AUTO: 0.2 X10E3/UL (ref 0–0.4)
EOSINOPHIL NFR BLD AUTO: 3 %
ERYTHROCYTE [DISTWIDTH] IN BLOOD BY AUTOMATED COUNT: 13.9 % (ref 12.3–15.4)
GLOBULIN SER CALC-MCNC: 2.5 G/DL (ref 1.5–4.5)
GLUCOSE SERPL-MCNC: 162 MG/DL (ref 65–99)
GLUCOSE UR QL: ABNORMAL
HBA1C MFR BLD: 6.8 % (ref 4.8–5.6)
HCT VFR BLD AUTO: 40.2 % (ref 37.5–51)
HDLC SERPL-MCNC: 50 MG/DL
HGB BLD-MCNC: 13.5 G/DL (ref 13–17.7)
HGB UR QL STRIP: NEGATIVE
IMM GRANULOCYTES # BLD AUTO: 0 X10E3/UL (ref 0–0.1)
IMM GRANULOCYTES NFR BLD AUTO: 0 %
KETONES UR QL STRIP: NEGATIVE
LDLC SERPL CALC-MCNC: 59 MG/DL (ref 0–99)
LEUKOCYTE ESTERASE UR QL STRIP: NEGATIVE
LYMPHOCYTES # BLD AUTO: 2.8 X10E3/UL (ref 0.7–3.1)
LYMPHOCYTES NFR BLD AUTO: 44 %
MCH RBC QN AUTO: 30.6 PG (ref 26.6–33)
MCHC RBC AUTO-ENTMCNC: 33.6 G/DL (ref 31.5–35.7)
MCV RBC AUTO: 91 FL (ref 79–97)
MICRO URNS: ABNORMAL
MICROALBUMIN UR-MCNC: 3.8 UG/ML
MONOCYTES # BLD AUTO: 0.5 X10E3/UL (ref 0.1–0.9)
MONOCYTES NFR BLD AUTO: 7 %
NEUTROPHILS # BLD AUTO: 2.9 X10E3/UL (ref 1.4–7)
NEUTROPHILS NFR BLD AUTO: 46 %
NITRITE UR QL STRIP: NEGATIVE
PH UR STRIP: 5.5 [PH] (ref 5–7.5)
PLATELET # BLD AUTO: 147 X10E3/UL (ref 150–379)
POTASSIUM SERPL-SCNC: 4.9 MMOL/L (ref 3.5–5.2)
PROT SERPL-MCNC: 6.6 G/DL (ref 6–8.5)
PROT UR QL STRIP: NEGATIVE
PSA SERPL-MCNC: <0.1 NG/ML (ref 0–4)
RBC # BLD AUTO: 4.41 X10E6/UL (ref 4.14–5.8)
SODIUM SERPL-SCNC: 137 MMOL/L (ref 134–144)
SP GR UR: 1.02 (ref 1–1.03)
TRIGL SERPL-MCNC: 149 MG/DL (ref 0–149)
TSH SERPL DL<=0.005 MIU/L-ACNC: 0.88 UIU/ML (ref 0.45–4.5)
UROBILINOGEN UR STRIP-MCNC: 0.2 MG/DL (ref 0.2–1)
VLDLC SERPL CALC-MCNC: 30 MG/DL (ref 5–40)
WBC # BLD AUTO: 6.3 X10E3/UL (ref 3.4–10.8)

## 2019-01-24 ENCOUNTER — TELEPHONE (OUTPATIENT)
Dept: INTERNAL MEDICINE | Facility: CLINIC | Age: 79
End: 2019-01-24

## 2019-01-24 RX ORDER — LOSARTAN POTASSIUM 100 MG/1
100 TABLET ORAL DAILY
Qty: 90 TABLET | Refills: 3 | Status: SHIPPED | OUTPATIENT
Start: 2019-01-24 | End: 2020-02-17

## 2019-01-31 RX ORDER — GABAPENTIN 400 MG/1
400 CAPSULE ORAL 3 TIMES DAILY
Qty: 270 CAPSULE | Refills: 2 | Status: SHIPPED | OUTPATIENT
Start: 2019-01-31 | End: 2019-08-21 | Stop reason: SDUPTHER

## 2019-02-05 ENCOUNTER — OFFICE VISIT (OUTPATIENT)
Dept: INTERNAL MEDICINE | Facility: CLINIC | Age: 79
End: 2019-02-05

## 2019-02-05 VITALS
HEART RATE: 70 BPM | SYSTOLIC BLOOD PRESSURE: 130 MMHG | HEIGHT: 73 IN | WEIGHT: 200 LBS | OXYGEN SATURATION: 96 % | DIASTOLIC BLOOD PRESSURE: 70 MMHG | BODY MASS INDEX: 26.51 KG/M2

## 2019-02-05 DIAGNOSIS — K63.5 POLYP OF COLON, UNSPECIFIED PART OF COLON, UNSPECIFIED TYPE: ICD-10-CM

## 2019-02-05 DIAGNOSIS — K21.9 GASTROESOPHAGEAL REFLUX DISEASE WITHOUT ESOPHAGITIS: ICD-10-CM

## 2019-02-05 DIAGNOSIS — G62.9 PERIPHERAL POLYNEUROPATHY: ICD-10-CM

## 2019-02-05 DIAGNOSIS — E55.9 VITAMIN D DEFICIENCY: ICD-10-CM

## 2019-02-05 DIAGNOSIS — M54.5 CHRONIC LOW BACK PAIN, UNSPECIFIED BACK PAIN LATERALITY, WITH SCIATICA PRESENCE UNSPECIFIED: ICD-10-CM

## 2019-02-05 DIAGNOSIS — M54.2 NECK PAIN: ICD-10-CM

## 2019-02-05 DIAGNOSIS — I10 ESSENTIAL HYPERTENSION: ICD-10-CM

## 2019-02-05 DIAGNOSIS — G47.00 INSOMNIA, UNSPECIFIED TYPE: Chronic | ICD-10-CM

## 2019-02-05 DIAGNOSIS — M46.1 INFLAMMATION OF SACROILIAC JOINT (HCC): ICD-10-CM

## 2019-02-05 DIAGNOSIS — I25.119 CORONARY ARTERY DISEASE INVOLVING NATIVE CORONARY ARTERY OF NATIVE HEART WITH ANGINA PECTORIS (HCC): Primary | Chronic | ICD-10-CM

## 2019-02-05 DIAGNOSIS — T78.40XA ALLERGIC REACTION TO DRUG, INITIAL ENCOUNTER: ICD-10-CM

## 2019-02-05 DIAGNOSIS — G89.29 CHRONIC LOW BACK PAIN, UNSPECIFIED BACK PAIN LATERALITY, WITH SCIATICA PRESENCE UNSPECIFIED: ICD-10-CM

## 2019-02-05 DIAGNOSIS — M54.16 LUMBAR RADICULOPATHY: ICD-10-CM

## 2019-02-05 DIAGNOSIS — K76.0 FATTY LIVER: ICD-10-CM

## 2019-02-05 DIAGNOSIS — R19.5 OCCULT BLOOD IN STOOLS: ICD-10-CM

## 2019-02-05 DIAGNOSIS — M41.9 SCOLIOSIS OF THORACOLUMBAR SPINE, UNSPECIFIED SCOLIOSIS TYPE: ICD-10-CM

## 2019-02-05 DIAGNOSIS — N20.0 CALCULUS OF KIDNEY: ICD-10-CM

## 2019-02-05 DIAGNOSIS — E11.8 TYPE 2 DIABETES MELLITUS WITH COMPLICATION, WITHOUT LONG-TERM CURRENT USE OF INSULIN (HCC): ICD-10-CM

## 2019-02-05 DIAGNOSIS — R79.89 ELEVATED LFTS: ICD-10-CM

## 2019-02-05 DIAGNOSIS — C61 MALIGNANT NEOPLASM OF PROSTATE (HCC): ICD-10-CM

## 2019-02-05 DIAGNOSIS — K40.90 INGUINAL HERNIA WITHOUT OBSTRUCTION OR GANGRENE, RECURRENCE NOT SPECIFIED, UNSPECIFIED LATERALITY: Chronic | ICD-10-CM

## 2019-02-05 DIAGNOSIS — K50.00 ILEITIS, TERMINAL, WITHOUT COMPLICATIONS (HCC): ICD-10-CM

## 2019-02-05 DIAGNOSIS — M19.90 ARTHRITIS: Chronic | ICD-10-CM

## 2019-02-05 DIAGNOSIS — R60.0 EDEMA OF LOWER EXTREMITY: ICD-10-CM

## 2019-02-05 DIAGNOSIS — D50.0 IRON DEFICIENCY ANEMIA DUE TO CHRONIC BLOOD LOSS: ICD-10-CM

## 2019-02-05 DIAGNOSIS — E78.5 HYPERLIPIDEMIA, UNSPECIFIED HYPERLIPIDEMIA TYPE: ICD-10-CM

## 2019-02-05 PROCEDURE — G0439 PPPS, SUBSEQ VISIT: HCPCS | Performed by: INTERNAL MEDICINE

## 2019-02-05 RX ORDER — TRAMADOL HYDROCHLORIDE 50 MG/1
50 TABLET ORAL 2 TIMES DAILY PRN
Qty: 40 TABLET | Refills: 0 | Status: SHIPPED | OUTPATIENT
Start: 2019-02-05 | End: 2020-04-20

## 2019-02-05 NOTE — PROGRESS NOTES
QUICK REFERENCE INFORMATION:  The ABCs of the Annual Wellness Visit    Subsequent Medicare Wellness Visit    HEALTH RISK ASSESSMENT    1940    Recent Hospitalizations:  No hospitalization(s) within the last year..        Current Medical Providers:  Patient Care Team:  Cayden Chiang MD as PCP - General  Cayden Chiang MD as PCP - Claims Attributed  Vicky Perez MD as Consulting Physician (General Surgery)        Smoking Status:  Social History     Tobacco Use   Smoking Status Never Smoker   Smokeless Tobacco Never Used       Alcohol Consumption:  Social History     Substance and Sexual Activity   Alcohol Use No       Depression Screen:   PHQ-2/PHQ-9 Depression Screening 10/10/2017   Little interest or pleasure in doing things 0   Feeling down, depressed, or hopeless 0   Trouble falling or staying asleep, or sleeping too much -   Feeling tired or having little energy -   Poor appetite or overeating -   Feeling bad about yourself - or that you are a failure or have let yourself or your family down -   Trouble concentrating on things, such as reading the newspaper or watching television -   Moving or speaking so slowly that other people could have noticed. Or the opposite - being so fidgety or restless that you have been moving around a lot more than usual -   Thoughts that you would be better off dead, or of hurting yourself in some way -   Total Score 0   If you checked off any problems, how difficult have these problems made it for you to do your work, take care of things at home, or get along with other people? -       Health Habits and Functional and Cognitive Screening:  Functional & Cognitive Status 10/10/2017   Do you have difficulty preparing food and eating? No   Do you have difficulty bathing yourself, getting dressed or grooming yourself? No   Do you have difficulty using the toilet? No   Do you have difficulty moving around from place to place? No   In the past year have you fallen or experienced  a near fall? No   Current Diet Well Balanced Diet   Dental Exam Not up to date   Eye Exam Up to date   Exercise (times per week) Walking 3 time a day   Current Exercise Activities Include walking   Do you need help using the phone?  No   Are you deaf or do you have serious difficulty hearing?  No   Do you need help with transportation? No   Do you need help shopping? No   Do you need help preparing meals?  No   Do you need help with housework?  No   Do you need help with laundry? No   Do you need help taking your medications? No   Do you need help managing money? No   Do you have difficulty concentrating, remembering or making decisions? No           Does the patient have evidence of cognitive impairment? No    Aspirin use counseling: Taking ASA appropriately as indicated      Recent Lab Results:  CMP:  Lab Results   Component Value Date     (H) 01/18/2019    BUN 13 01/18/2019    CREATININE 1.41 (H) 01/18/2019    EGFRIFNONA 47 (L) 01/18/2019    EGFRIFAFRI 54 (L) 01/18/2019    BCR 9 (L) 01/18/2019     01/18/2019    K 4.9 01/18/2019    CO2 20 01/18/2019    CALCIUM 8.9 01/18/2019    PROTENTOTREF 6.6 01/18/2019    ALBUMIN 4.1 01/18/2019    LABGLOBREF 2.5 01/18/2019    LABIL2 1.6 01/18/2019    BILITOT 0.5 01/18/2019    ALKPHOS 54 01/18/2019    AST 24 01/18/2019    ALT 12 01/18/2019     Lipid Panel:  Lab Results   Component Value Date    CHOL 132 01/23/2017    TRIG 149 01/18/2019    HDL 50 01/18/2019    VLDL 30 01/18/2019     HbA1c:  Lab Results   Component Value Date    HGBA1C 6.8 (H) 01/18/2019       Visual Acuity:  No exam data present    Age-appropriate Screening Schedule:  Refer to the list below for future screening recommendations based on patient's age, sex and/or medical conditions. Orders for these recommended tests are listed in the plan section. The patient has been provided with a written plan.    Health Maintenance   Topic Date Due   • TDAP/TD VACCINES (1 - Tdap) 01/[birth year]/1959   • ZOSTER VACCINE (2  of 2) 11/10/2015   • INFLUENZA VACCINE  08/01/2018   • HEMOGLOBIN A1C  07/18/2019   • LIPID PANEL  01/18/2020   • URINE MICROALBUMIN  01/18/2020   • COLONOSCOPY  04/04/2027   • PNEUMOCOCCAL VACCINES (65+ LOW/MEDIUM RISK)  Completed        Subjective   History of Present Illness    Tristan Haines is a 79 y.o. male who presents for an Subsequent Wellness Visit.    The following portions of the patient's history were reviewed and updated as appropriate: allergies, current medications, past family history, past medical history, past social history, past surgical history and problem list.    Outpatient Medications Prior to Visit   Medication Sig Dispense Refill   • albuterol (PROVENTIL HFA;VENTOLIN HFA) 108 (90 Base) MCG/ACT inhaler Inhale 2 puffs Every 6 (Six) Hours As Needed for Wheezing. 1 inhaler 1   • aspirin 81 MG tablet Take 81 mg by mouth Daily.     • atorvastatin (LIPITOR) 40 MG tablet Take 1 tablet by mouth Daily. 200001 90 tablet 3   • betamethasone dipropionate (DIPROLENE) 0.05 % cream Apply  topically Daily. (Patient taking differently: Apply 1 application topically to the appropriate area as directed As Needed.) 45 g 3   • carvedilol (COREG) 25 MG tablet TAKE 1 TABLET BY MOUTH TWO TIMES A DAY  180 tablet 0   • Cholecalciferol (VITAMIN D3) 2000 UNITS tablet Take  by mouth Daily.     • dicyclomine (BENTYL) 20 MG tablet TAKE 1 TABLET BY MOUTH EVERY EIGHT HOURS AS NEEDED FOR PAIN  0   • gabapentin (NEURONTIN) 400 MG capsule Take 1 capsule by mouth 3 (Three) Times a Day. 270 capsule 2   • glucose blood (ONE TOUCH ULTRA TEST) test strip Use as instructed THREE TIMES A DAY to test blood sugar 150 each 4   • Insulin Pen Needle (BD PEN NEEDLE EDDI U/F) 32G X 4 MM misc PATIENT TESTING 3-4 TIMES A DAY. 150 each 11   • Iron, Ferrous Gluconate, 256 (28 Fe) MG tablet 1 daily po (Patient taking differently: Take  by mouth Daily. 1 daily po) 30 tablet 3   • lansoprazole (PREVACID) 30 MG capsule Take 1 capsule by mouth Daily.  90 capsule 3   • linaclotide (LINZESS) 72 MCG capsule capsule Take 1 capsule by mouth Every Morning Before Breakfast. 30 capsule 3   • losartan (COZAAR) 100 MG tablet Take 1 tablet by mouth Daily. 200001 90 tablet 3   • nitroglycerin (NITROSTAT) 0.4 MG SL tablet Place 1 tablet under the tongue Every 5 (Five) Minutes As Needed for Chest Pain. Take no more than 3 doses in 15 minutes. 50 tablet 12   • NOVOLOG FLEXPEN 100 UNIT/ML solution pen-injector sc pen inject 5 units under the skin 2 times per day before meals (Patient taking differently: PATIENT DOES 6 UNITS BEFORE MEALS 2 TIMES PER DAY) 15 mL 2   • Probiotic Product (PROBIOTIC DAILY PO) Take 1 tablet by mouth Daily.     • spironolactone (ALDACTONE) 25 MG tablet Take 1 tablet by mouth Daily. (Patient taking differently: Take 6.25 mg by mouth 2 (Two) Times a Day.) 90 tablet 3   • TOUJEO SOLOSTAR 300 UNIT/ML solution pen-injector inject 25 UNITS under the skin TWO TIMES A DAY  4.5 mL 3   • traZODone (DESYREL) 50 MG tablet TAKE 1 TABLET BY MOUTH AT BEDTIME  (Patient taking differently: TAKES ONE QUARTER TABLET BY MOUTH AT BEDTIME PRN) 30 tablet 3     No facility-administered medications prior to visit.        Patient Active Problem List   Diagnosis   • Arthritis   • Colon polyp   • Diabetes mellitus (CMS/HCC)   • Edema of lower extremity   • Occult blood in stools   • Gastroesophageal reflux disease   • Hyperlipidemia   • Hypertension   • Insomnia   • Lumbar radiculopathy   • Neck pain   • Calculus of kidney   • Peripheral neuropathy   • Malignant neoplasm of prostate (CMS/HCC)   • Inflammation of sacroiliac joint (CMS/HCC)   • Vitamin D deficiency   • Hearing problem of both ears   • CAD (coronary artery disease)   • Inguinal hernia   • Chronic hydrocele   • Elevated LFTs (R/O due to shock)   • Scoliosis of thoracolumbar spine (S/P Surgery w/ hardware, S/P Epidural pain pump)   • Chronic low back pain   • Fatty liver   • Allergic drug reaction   • Iron deficiency  anemia due to chronic blood loss       Advance Care Planning:  has an advance directive - a copy has been provided and is in file    Identification of Risk Factors:  Risk factors include: weight .    Review of Systems   Constitutional: Negative.    Respiratory: Negative.    Cardiovascular: Negative.    Gastrointestinal: Negative.    Skin: Negative.    Psychiatric/Behavioral: Negative.        Compared to one year ago, the patient feels his physical health is better.  Compared to one year ago, the patient feels his mental health is the same.    Objective     Physical Exam   Constitutional: He is oriented to person, place, and time. He appears well-developed and well-nourished.   Neck: Neck supple.   Cardiovascular: Normal rate, regular rhythm and normal heart sounds.   Pulmonary/Chest: Effort normal and breath sounds normal.   Abdominal: Soft. Bowel sounds are normal.   Neurological: He is alert and oriented to person, place, and time.   Psychiatric: He has a normal mood and affect. His behavior is normal.       There were no vitals filed for this visit.    Patient's There is no height or weight on file to calculate BMI. BMI is within normal parameters. No follow-up required..      Assessment/Plan   Patient Self-Management and Personalized Health Advice  The patient has been provided with information about: diet, exercise and weight management and preventive services including:   · Advance directive.    Visit Diagnoses:  No diagnosis found.    No orders of the defined types were placed in this encounter.      Outpatient Encounter Medications as of 2/5/2019   Medication Sig Dispense Refill   • albuterol (PROVENTIL HFA;VENTOLIN HFA) 108 (90 Base) MCG/ACT inhaler Inhale 2 puffs Every 6 (Six) Hours As Needed for Wheezing. 1 inhaler 1   • aspirin 81 MG tablet Take 81 mg by mouth Daily.     • atorvastatin (LIPITOR) 40 MG tablet Take 1 tablet by mouth Daily. 200001 90 tablet 3   • betamethasone dipropionate (DIPROLENE) 0.05  % cream Apply  topically Daily. (Patient taking differently: Apply 1 application topically to the appropriate area as directed As Needed.) 45 g 3   • carvedilol (COREG) 25 MG tablet TAKE 1 TABLET BY MOUTH TWO TIMES A DAY  180 tablet 0   • Cholecalciferol (VITAMIN D3) 2000 UNITS tablet Take  by mouth Daily.     • dicyclomine (BENTYL) 20 MG tablet TAKE 1 TABLET BY MOUTH EVERY EIGHT HOURS AS NEEDED FOR PAIN  0   • gabapentin (NEURONTIN) 400 MG capsule Take 1 capsule by mouth 3 (Three) Times a Day. 270 capsule 2   • glucose blood (ONE TOUCH ULTRA TEST) test strip Use as instructed THREE TIMES A DAY to test blood sugar 150 each 4   • Insulin Pen Needle (BD PEN NEEDLE EDDI U/F) 32G X 4 MM misc PATIENT TESTING 3-4 TIMES A DAY. 150 each 11   • Iron, Ferrous Gluconate, 256 (28 Fe) MG tablet 1 daily po (Patient taking differently: Take  by mouth Daily. 1 daily po) 30 tablet 3   • lansoprazole (PREVACID) 30 MG capsule Take 1 capsule by mouth Daily. 90 capsule 3   • linaclotide (LINZESS) 72 MCG capsule capsule Take 1 capsule by mouth Every Morning Before Breakfast. 30 capsule 3   • losartan (COZAAR) 100 MG tablet Take 1 tablet by mouth Daily. 212073 90 tablet 3   • nitroglycerin (NITROSTAT) 0.4 MG SL tablet Place 1 tablet under the tongue Every 5 (Five) Minutes As Needed for Chest Pain. Take no more than 3 doses in 15 minutes. 50 tablet 12   • NOVOLOG FLEXPEN 100 UNIT/ML solution pen-injector sc pen inject 5 units under the skin 2 times per day before meals (Patient taking differently: PATIENT DOES 6 UNITS BEFORE MEALS 2 TIMES PER DAY) 15 mL 2   • Probiotic Product (PROBIOTIC DAILY PO) Take 1 tablet by mouth Daily.     • spironolactone (ALDACTONE) 25 MG tablet Take 1 tablet by mouth Daily. (Patient taking differently: Take 6.25 mg by mouth 2 (Two) Times a Day.) 90 tablet 3   • TOUJEO SOLOSTAR 300 UNIT/ML solution pen-injector inject 25 UNITS under the skin TWO TIMES A DAY  4.5 mL 3   • traZODone (DESYREL) 50 MG tablet TAKE 1  TABLET BY MOUTH AT BEDTIME  (Patient taking differently: TAKES ONE QUARTER TABLET BY MOUTH AT BEDTIME PRN) 30 tablet 3     No facility-administered encounter medications on file as of 2/5/2019.        Reviewed use of high risk medication in the elderly: yes  Reviewed for potential of harmful drug interactions in the elderly: no    Follow Up:  No Follow-up on file.     An After Visit Summary and PPPS with all of these plans were given to the patient.        Type 2 diabetes mellitus continue med    Essential hypertension continue med   Radiculopathy, cervical  S/p shots and doing better,   Constipation , fiber probiotic, no diarrhea  lumbar disc surgery, 2004 L4-5.  LBP lumbar radiculopathy. continue Flexeril Mobic, MRI L2/3 mod to severe stenosis. continue follow-up with chiropractor, s/p ablation, follow up with pain clinic start tramadol  neuropathy,neurotin 400mg bid continue unable to tolerate lyrica  sacroiliac inflammation continue chiro. tylenol arthritis.  edema resolved after d/c amlodipine  Coronary artery disease a status post a CABG pleural effusion follow up chest x-ray continue cardio follow up, aldactone caused high potassium.  Anemia, resolved.  Bright red blood per rectum Hemoccult positive m. w/u lab done.  low ferritin sbft if still anemia. s/p colonoscopy3/2017:ileitis and s/p upper endoscopy:esophagitis, Hb low,  cont iron pill  eczema refill medicine  diarrhea, metformin caused, resolved after d/c metformin  GERD hx of HH surgery continue medicine  s/p EGD  Hyperlipidemia cotninue atorvastatin 40 mg   Peripheral neuropathy continue medications  tinnitus, loss of hearing  vision change see eye doctor  leg cramps skin lesions,   hydrocele chronic patient decline to see uro   sleep disturbances, continue trazodone  Td to 2010?, prevnar 13 done, zostavax done, refuse flushot. pneumovax done, shingrix informed--  prostate ca s/p removal PSA=0.00, now urine hesitation seen by uro.   Sleep apnea, sleep  study negative for sleep apnea  Constipation, fiber and water, metamucil improved      CT:gall stone, hydrocele and divertic       CKD watch       Td to HD      Gynecomastia and mamm and us done , prolactin slight high, other hormones normal. Watch for now--     Anemia resolved ? Due to gastritis per dr solorio continue PPI          3 mo wellness after labs

## 2019-02-11 RX ORDER — LANSOPRAZOLE 30 MG/1
30 CAPSULE, DELAYED RELEASE ORAL DAILY
Qty: 90 CAPSULE | Refills: 3 | Status: SHIPPED | OUTPATIENT
Start: 2019-02-11 | End: 2020-01-31

## 2019-02-13 ENCOUNTER — PRIOR AUTHORIZATION (OUTPATIENT)
Dept: INTERNAL MEDICINE | Facility: CLINIC | Age: 79
End: 2019-02-13

## 2019-02-18 RX ORDER — ATORVASTATIN CALCIUM 40 MG/1
TABLET, FILM COATED ORAL
Qty: 90 TABLET | Refills: 2 | Status: SHIPPED | OUTPATIENT
Start: 2019-02-18 | End: 2019-11-19 | Stop reason: SDUPTHER

## 2019-02-18 RX ORDER — CARVEDILOL 25 MG/1
TABLET ORAL
Qty: 180 TABLET | Refills: 0 | Status: SHIPPED | OUTPATIENT
Start: 2019-02-18 | End: 2019-05-18 | Stop reason: SDUPTHER

## 2019-02-19 ENCOUNTER — TELEPHONE (OUTPATIENT)
Dept: INTERNAL MEDICINE | Facility: CLINIC | Age: 79
End: 2019-02-19

## 2019-02-19 RX ORDER — TRAZODONE HYDROCHLORIDE 50 MG/1
50 TABLET ORAL
Qty: 30 TABLET | Refills: 3 | Status: SHIPPED | OUTPATIENT
Start: 2019-02-19 | End: 2020-03-23

## 2019-03-18 RX ORDER — BETAMETHASONE DIPROPIONATE 0.5 MG/G
CREAM TOPICAL
Qty: 45 G | Refills: 2 | Status: SHIPPED | OUTPATIENT
Start: 2019-03-18 | End: 2020-02-17

## 2019-05-08 ENCOUNTER — APPOINTMENT (OUTPATIENT)
Dept: GENERAL RADIOLOGY | Facility: HOSPITAL | Age: 79
End: 2019-05-08

## 2019-05-08 ENCOUNTER — HOSPITAL ENCOUNTER (EMERGENCY)
Facility: HOSPITAL | Age: 79
Discharge: HOME | End: 2019-05-08
Attending: STUDENT IN AN ORGANIZED HEALTH CARE EDUCATION/TRAINING PROGRAM | Admitting: STUDENT IN AN ORGANIZED HEALTH CARE EDUCATION/TRAINING PROGRAM

## 2019-05-08 ENCOUNTER — TELEPHONE (OUTPATIENT)
Dept: INTERNAL MEDICINE | Facility: CLINIC | Age: 79
End: 2019-05-08

## 2019-05-08 VITALS
RESPIRATION RATE: 16 BRPM | OXYGEN SATURATION: 97 % | HEIGHT: 73 IN | DIASTOLIC BLOOD PRESSURE: 74 MMHG | TEMPERATURE: 98 F | SYSTOLIC BLOOD PRESSURE: 138 MMHG | HEART RATE: 71 BPM | BODY MASS INDEX: 26.85 KG/M2 | WEIGHT: 202.6 LBS

## 2019-05-08 DIAGNOSIS — M20.011 MALLET FINGER OF RIGHT HAND: Primary | ICD-10-CM

## 2019-05-08 PROCEDURE — 99282 EMERGENCY DEPT VISIT SF MDM: CPT

## 2019-05-08 PROCEDURE — 73140 X-RAY EXAM OF FINGER(S): CPT

## 2019-05-08 NOTE — TELEPHONE ENCOUNTER
Patient called stating that he had dislocated his finger, and wanted to know what we could do for him. Passed call to [facility].

## 2019-05-08 NOTE — ED PROVIDER NOTES
Subjective   79-year-old male that presents with an injury to his right ring finger.  Patient states that he fell just prior to arrival attempted to catch himself.  States is unsure how his hand actually struck but that currently he cannot extend at the DIP joint on the right ring finger.  Patient denies pain.  No laceration.  Did not injure any other parts of his body.            Review of Systems   All other systems reviewed and are negative.      Past Medical History:   Diagnosis Date   • Abdominal pain     Hx of.   • Atypical chest pain     Hx of. LAST TIME WAS IN 2017 AND SEEN DR LÓPEZ   • Colon polyps    • COPD (chronic obstructive pulmonary disease) (CMS/HCC)    • Coronary artery disease    • Diabetes mellitus (CMS/HCC)    • Diarrhea     diarrhea, metformin caused, resolved after d/c metformin   • Eczema     Requesting ointment   • Edema of leg    • GERD (gastroesophageal reflux disease)    • Hernia     heietal hernia   • History of exercise stress test 2016   • History of kidney stones     ALL PASSED WITHOUT INTERVENTION   • History of nuclear stress test 2016   • Leg cramps    • Neck pain    • Nephrolithiasis    • Prostate cancer (CMS/HCC)    • Skin lesions    • SOB (shortness of breath)     HISTORY OF. 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 positive    • Vitamin D deficiency        No Known Allergies    Past Surgical History:   Procedure Laterality Date   • APPENDECTOMY     • BACK SURGERY  2003    REMOVED BULGING DISC THAT BROKE OFF   • COLONOSCOPY      2017-Chris   • CORONARY ARTERY BYPASS GRAFT  11/2014     MAKER. THEREAFTER, FOUND AN ARTERY WAS KNICKED AND PATIENT TAKEN BACK TO SURGERY TO REPAIR   • ENDOSCOPY     • ENDOSCOPY N/A 8/31/2018    Procedure: ESOPHAGOGASTRODUODENOSCOPY WITH BIOPSY;  Surgeon: Vicky Perez MD;  Location: [facility] ENDOSCOPY;  Service: Gastroenterology   • EYE SURGERY Right     CATARACT   • HERNIA REPAIR  1989    Hiatal hernia   • PROSTATE SURGERY       "Prostate cancer s/p removal do PSA   • PROSTATECTOMY     • SKIN BIOPSY      HAVE HAD OVER 200 SPOTS REMOVED OF PRE-MELANOMA   • SMALL INTESTINE SURGERY  1997    REMOVED 12 INCHES OF INTESTINE \"DUE TO NITROGEN BUBBLES FORMING IN INTESTINAL WALLS CAUSING BLISTERS\"       Family History   Problem Relation Age of Onset   • Cancer Mother    • Diabetes Father    • Hypertension Father    • Liver disease Father    • Cancer Sister    • Diabetes Brother    • Heart attack Other         grandparent   • Arthritis Other    • Heart attack Other        Social History     Socioeconomic History   • Marital status:      Spouse name: Not on file   • Number of children: Not on file   • Years of education: Not on file   • Highest education level: Not on file   Tobacco Use   • Smoking status: Never Smoker   • Smokeless tobacco: Never Used   Substance and Sexual Activity   • Alcohol use: No   • Drug use: No   • Sexual activity: Defer           Objective   Physical Exam   Musculoskeletal:        Hands:  Nursing note and vitals reviewed.    GEN: No acute distress  Head: Normocephalic, atraumatic  Eyes: Pupils equal round reactive to light  ENT: Posterior pharynx normal in appearance, oral mucosa is moist  Chest: Nontender to palpation  Cardiovascular: Regular rate  Lungs: Clear to auscultation bilaterally  Abdomen: Soft, nontender, nondistended, no peritoneal signs  Extremities: See diagram  Neuro: GCS 15  Psych: Mood and affect are appropriate    Procedures           ED Course                  MDM  Number of Diagnoses or Management Options  Mallet finger of right hand:   Diagnosis management comments: X-ray shows no fracture.  Patient has a mallet finger will be referred to hand surgery.       Amount and/or Complexity of Data Reviewed  Tests in the radiology section of CPT®: reviewed  Decide to obtain previous medical records or to obtain history from someone other than the patient: yes  Obtain history from someone other than the " patient: yes  Independent visualization of images, tracings, or specimens: yes          Final diagnoses:   Mallet finger of right hand            Aracdio Sahu MD  05/08/19 [id number]

## 2019-05-18 RX ORDER — INSULIN GLARGINE 300 U/ML
INJECTION, SOLUTION SUBCUTANEOUS
Qty: 4.5 ML | Refills: 2 | Status: SHIPPED | OUTPATIENT
Start: 2019-05-18 | End: 2019-10-12 | Stop reason: SDUPTHER

## 2019-05-18 RX ORDER — CARVEDILOL 25 MG/1
TABLET ORAL
Qty: 180 TABLET | Refills: 0 | Status: SHIPPED | OUTPATIENT
Start: 2019-05-18 | End: 2019-08-21 | Stop reason: SDUPTHER

## 2019-05-18 RX ORDER — BETAMETHASONE DIPROPIONATE 0.5 MG/G
CREAM TOPICAL
Qty: 45 G | Refills: 2 | Status: SHIPPED | OUTPATIENT
Start: 2019-05-18 | End: 2019-07-24 | Stop reason: SDUPTHER

## 2019-06-24 ENCOUNTER — OFFICE VISIT (OUTPATIENT)
Dept: INTERNAL MEDICINE | Facility: CLINIC | Age: 79
End: 2019-06-24

## 2019-06-24 VITALS
SYSTOLIC BLOOD PRESSURE: 114 MMHG | DIASTOLIC BLOOD PRESSURE: 66 MMHG | TEMPERATURE: 97.8 F | WEIGHT: 198.4 LBS | HEART RATE: 63 BPM | BODY MASS INDEX: 26.29 KG/M2 | HEIGHT: 73 IN | OXYGEN SATURATION: 97 %

## 2019-06-24 DIAGNOSIS — C44.90 SKIN CANCER: ICD-10-CM

## 2019-06-24 DIAGNOSIS — M54.5 CHRONIC LOW BACK PAIN, UNSPECIFIED BACK PAIN LATERALITY, WITH SCIATICA PRESENCE UNSPECIFIED: Primary | ICD-10-CM

## 2019-06-24 DIAGNOSIS — E78.5 HYPERLIPIDEMIA, UNSPECIFIED HYPERLIPIDEMIA TYPE: ICD-10-CM

## 2019-06-24 DIAGNOSIS — R60.0 EDEMA OF LOWER EXTREMITY: ICD-10-CM

## 2019-06-24 DIAGNOSIS — G89.29 CHRONIC LOW BACK PAIN, UNSPECIFIED BACK PAIN LATERALITY, WITH SCIATICA PRESENCE UNSPECIFIED: Primary | ICD-10-CM

## 2019-06-24 DIAGNOSIS — R41.3 MEMORY LOSS: ICD-10-CM

## 2019-06-24 DIAGNOSIS — E11.8 TYPE 2 DIABETES MELLITUS WITH COMPLICATION, WITHOUT LONG-TERM CURRENT USE OF INSULIN (HCC): ICD-10-CM

## 2019-06-24 DIAGNOSIS — D50.0 IRON DEFICIENCY ANEMIA DUE TO CHRONIC BLOOD LOSS: ICD-10-CM

## 2019-06-24 PROCEDURE — 99214 OFFICE O/P EST MOD 30 MIN: CPT | Performed by: INTERNAL MEDICINE

## 2019-06-24 NOTE — PROGRESS NOTES
Subjective   Tristan Haines is a 79 y.o. male.     Chief Complaint   Patient presents with   • Skin Discoloration     on right foot X 4 - 5 days       History of Present Illness   Patient here complains right foot dorsal aspect mild swelling and erythematous.  Patient states that recently doing a lot of elliptical and close sides have some discoloration in the dorsal aspect of the foot.  Patient also complains of low back pain patient is seeing pain doctor receiving shots.  Patient also recently had skin cancers removed.  Also complains of memory loss especially in recall.    Current Outpatient Medications:   •  albuterol (PROVENTIL HFA;VENTOLIN HFA) 108 (90 Base) MCG/ACT inhaler, Inhale 2 puffs Every 6 (Six) Hours As Needed for Wheezing., Disp: 1 inhaler, Rfl: 1  •  aspirin 81 MG tablet, Take 81 mg by mouth Daily., Disp: , Rfl:   •  atorvastatin (LIPITOR) 40 MG tablet, TAKE 1 TABLET BY MOUTH ONE TIME A DAY , Disp: 90 tablet, Rfl: 2  •  betamethasone dipropionate (DIPROLENE) 0.05 % cream, APPLY TO AFFECTED AREA ONCE DAILY, Disp: 45 g, Rfl: 2  •  betamethasone dipropionate (DIPROLENE) 0.05 % cream, APPLY TO AFFECTED AREA ONCE DAILY, Disp: 45 g, Rfl: 2  •  carvedilol (COREG) 25 MG tablet, TAKE 1 TABLET BY MOUTH TWO TIMES A DAY , Disp: 180 tablet, Rfl: 0  •  Cholecalciferol (VITAMIN D3) 2000 UNITS tablet, Take  by mouth Daily., Disp: , Rfl:   •  gabapentin (NEURONTIN) 400 MG capsule, Take 1 capsule by mouth 3 (Three) Times a Day., Disp: 270 capsule, Rfl: 2  •  glucose blood (ONE TOUCH ULTRA TEST) test strip, Use as instructed THREE TIMES A DAY to test blood sugar, Disp: 150 each, Rfl: 4  •  insulin aspart (NOVOLOG FLEXPEN) 100 UNIT/ML solution pen-injector sc pen, Inject 5 Units under the skin into the appropriate area as directed 3 (Three) Times a Day With Meals., Disp: 15 mL, Rfl: 2  •  Insulin Pen Needle (BD PEN NEEDLE EDDI U/F) 32G X 4 MM misc, PATIENT TESTING 3-4 TIMES A DAY., Disp: 150 each, Rfl: 11  •  Iron,  Ferrous Gluconate, 256 (28 Fe) MG tablet, 1 daily po (Patient taking differently: Take  by mouth Daily. 1 daily po), Disp: 30 tablet, Rfl: 3  •  lansoprazole (PREVACID) 30 MG capsule, Take 1 capsule by mouth Daily., Disp: 90 capsule, Rfl: 3  •  losartan (COZAAR) 100 MG tablet, Take 1 tablet by mouth Daily. 200001, Disp: 90 tablet, Rfl: 3  •  nitroglycerin (NITROSTAT) 0.4 MG SL tablet, Place 1 tablet under the tongue Every 5 (Five) Minutes As Needed for Chest Pain. Take no more than 3 doses in 15 minutes., Disp: 50 tablet, Rfl: 12  •  Probiotic Product (PROBIOTIC DAILY PO), Take 1 tablet by mouth Daily., Disp: , Rfl:   •  TOUJEO SOLOSTAR 300 UNIT/ML solution pen-injector, inject 25 units under the skin twice daily , Disp: 4.5 mL, Rfl: 2  •  traMADol (ULTRAM) 50 MG tablet, Take 1 tablet by mouth 2 (Two) Times a Day As Needed for Moderate Pain ., Disp: 40 tablet, Rfl: 0  •  traZODone (DESYREL) 50 MG tablet, Take 1 tablet by mouth every night at bedtime., Disp: 30 tablet, Rfl: 3    The following portions of the patient's history were reviewed and updated as appropriate: allergies, current medications, past family history, past medical history, past social history, past surgical history and problem list.    Review of Systems   Constitutional: Negative.    Respiratory: Negative.    Cardiovascular: Negative.         Dorsal aspect feet edema trace   Gastrointestinal: Negative.    Musculoskeletal: Positive for back pain.   Skin: Positive for color change.   Neurological: Negative.         Memory loss   Psychiatric/Behavioral: Negative.        Objective   Physical Exam   Constitutional: He is oriented to person, place, and time. He appears well-nourished.   Neck: Neck supple.   Cardiovascular: Normal rate, regular rhythm and normal heart sounds.   Pulmonary/Chest: Effort normal and breath sounds normal.   Abdominal: Bowel sounds are normal.   Musculoskeletal: He exhibits tenderness.   Neurological: He is alert and oriented to  person, place, and time.   Memory testing showed 29 points on Mini-Mental test   Skin: Skin is warm.   Bruise both dorsal part of the feet right side worse   Psychiatric: He has a normal mood and affect.       All tests have been reviewed.    Assessment/Plan   Diagnoses and all orders for this visit:    Chronic low back pain, unspecified back pain laterality, with sciatica presence unspecified continue follow-up with pain clinic.    Edema of lower extremity continue to watch ice patient to wear loose shoes    Skin cancer follow-up with dermatologist    Memory loss Mini-Mental status examination showed a score of 29.  Continue to watch    Follow-up regular appointment.

## 2019-07-12 LAB
ALBUMIN SERPL-MCNC: 3.7 G/DL (ref 3.5–5)
ALBUMIN/GLOB SERPL: 1.4 G/DL (ref 1–2)
ALP SERPL-CCNC: 57 U/L (ref 38–126)
ALT SERPL-CCNC: 28 U/L (ref 13–69)
AST SERPL-CCNC: 25 U/L (ref 15–46)
BASOPHILS # BLD AUTO: 0.05 10*3/MM3 (ref 0–0.2)
BASOPHILS NFR BLD AUTO: 0.9 % (ref 0–1.5)
BILIRUB SERPL-MCNC: 0.9 MG/DL (ref 0.2–1.3)
BUN SERPL-MCNC: 11 MG/DL (ref 7–20)
BUN/CREAT SERPL: 9.2 (ref 6.3–21.9)
CALCIUM SERPL-MCNC: 8.9 MG/DL (ref 8.4–10.2)
CHLORIDE SERPL-SCNC: 105 MMOL/L (ref 98–107)
CHOLEST SERPL-MCNC: 120 MG/DL (ref 0–199)
CK SERPL-CCNC: 226 U/L (ref 30–170)
CO2 SERPL-SCNC: 28 MMOL/L (ref 26–30)
CREAT SERPL-MCNC: 1.2 MG/DL (ref 0.6–1.3)
EOSINOPHIL # BLD AUTO: 0.24 10*3/MM3 (ref 0–0.4)
EOSINOPHIL NFR BLD AUTO: 4.1 % (ref 0.3–6.2)
ERYTHROCYTE [DISTWIDTH] IN BLOOD BY AUTOMATED COUNT: 12.7 % (ref 12.3–15.4)
GLOBULIN SER CALC-MCNC: 2.6 GM/DL
GLUCOSE SERPL-MCNC: 77 MG/DL (ref 74–98)
HBA1C MFR BLD: 6.2 % (ref 4.8–5.6)
HCT VFR BLD AUTO: 41.7 % (ref 37.5–51)
HDLC SERPL-MCNC: 52 MG/DL (ref 40–60)
HGB BLD-MCNC: 14 G/DL (ref 13–17.7)
IMM GRANULOCYTES # BLD AUTO: 0.01 10*3/MM3 (ref 0–0.05)
IMM GRANULOCYTES NFR BLD AUTO: 0.2 % (ref 0–0.5)
LDLC SERPL CALC-MCNC: 50 MG/DL (ref 0–99)
LYMPHOCYTES # BLD AUTO: 2.12 10*3/MM3 (ref 0.7–3.1)
LYMPHOCYTES NFR BLD AUTO: 36.4 % (ref 19.6–45.3)
MCH RBC QN AUTO: 31 PG (ref 26.6–33)
MCHC RBC AUTO-ENTMCNC: 33.6 G/DL (ref 31.5–35.7)
MCV RBC AUTO: 92.3 FL (ref 79–97)
MONOCYTES # BLD AUTO: 0.43 10*3/MM3 (ref 0.1–0.9)
MONOCYTES NFR BLD AUTO: 7.4 % (ref 5–12)
NEUTROPHILS # BLD AUTO: 2.98 10*3/MM3 (ref 1.7–7)
NEUTROPHILS NFR BLD AUTO: 51 % (ref 42.7–76)
NRBC BLD AUTO-RTO: 0 /100 WBC (ref 0–0.2)
PLATELET # BLD AUTO: 138 10*3/MM3 (ref 140–450)
POTASSIUM SERPL-SCNC: 4.1 MMOL/L (ref 3.5–5.1)
PROT SERPL-MCNC: 6.3 G/DL (ref 6.3–8.2)
RBC # BLD AUTO: 4.52 10*6/MM3 (ref 4.14–5.8)
SODIUM SERPL-SCNC: 140 MMOL/L (ref 137–145)
TRIGL SERPL-MCNC: 91 MG/DL
VLDLC SERPL CALC-MCNC: 18.2 MG/DL
WBC # BLD AUTO: 5.83 10*3/MM3 (ref 3.4–10.8)

## 2019-07-24 ENCOUNTER — OFFICE VISIT (OUTPATIENT)
Dept: INTERNAL MEDICINE | Facility: CLINIC | Age: 79
End: 2019-07-24

## 2019-07-24 VITALS
TEMPERATURE: 97.3 F | BODY MASS INDEX: 27.05 KG/M2 | WEIGHT: 204.12 LBS | HEART RATE: 68 BPM | HEIGHT: 73 IN | SYSTOLIC BLOOD PRESSURE: 116 MMHG | OXYGEN SATURATION: 94 % | DIASTOLIC BLOOD PRESSURE: 62 MMHG

## 2019-07-24 DIAGNOSIS — E11.8 TYPE 2 DIABETES MELLITUS WITH COMPLICATION, WITHOUT LONG-TERM CURRENT USE OF INSULIN (HCC): ICD-10-CM

## 2019-07-24 DIAGNOSIS — R60.0 EDEMA OF LOWER EXTREMITY: ICD-10-CM

## 2019-07-24 DIAGNOSIS — K13.0 LIP LESION: ICD-10-CM

## 2019-07-24 DIAGNOSIS — I10 ESSENTIAL HYPERTENSION: ICD-10-CM

## 2019-07-24 DIAGNOSIS — E78.5 HYPERLIPIDEMIA, UNSPECIFIED HYPERLIPIDEMIA TYPE: Primary | ICD-10-CM

## 2019-07-24 DIAGNOSIS — M46.1 INFLAMMATION OF SACROILIAC JOINT (HCC): ICD-10-CM

## 2019-07-24 PROBLEM — R79.89 ELEVATED LFTS: Status: RESOLVED | Noted: 2017-10-12 | Resolved: 2019-07-24

## 2019-07-24 PROBLEM — D50.0 IRON DEFICIENCY ANEMIA DUE TO CHRONIC BLOOD LOSS: Status: RESOLVED | Noted: 2018-08-20 | Resolved: 2019-07-24

## 2019-07-24 PROCEDURE — 99214 OFFICE O/P EST MOD 30 MIN: CPT | Performed by: INTERNAL MEDICINE

## 2019-07-24 NOTE — PROGRESS NOTES
Subjective   Tristan Haines is a 79 y.o. male.     Chief Complaint   Patient presents with   • Follow-up     6 month f/u        History of Present Illness   Patient here for follow-up of.  Hyperlipidemia stable on medication.  Hypertension stable on medication.  Diabetes A1c 6.2.  Patient is a Toujeo 20 units nighttime patient states low sugar in the morning.  Low back pain improved after epidural.  Lower extremity edema stable now.  Patient also complains of left lower lip lesion for several weeks no change no pain no tenderness.  And the right pinky DIP deformity secondary to injury seen by hand surgery not a surgical candidate.  Patient complains erythematous in the dorsal side of the right distal finger mild swelling    Current Outpatient Medications:   •  aspirin 81 MG tablet, Take 81 mg by mouth Daily., Disp: , Rfl:   •  atorvastatin (LIPITOR) 40 MG tablet, TAKE 1 TABLET BY MOUTH ONE TIME A DAY , Disp: 90 tablet, Rfl: 2  •  betamethasone dipropionate (DIPROLENE) 0.05 % cream, APPLY TO AFFECTED AREA ONCE DAILY, Disp: 45 g, Rfl: 2  •  carvedilol (COREG) 25 MG tablet, TAKE 1 TABLET BY MOUTH TWO TIMES A DAY , Disp: 180 tablet, Rfl: 0  •  Cholecalciferol (VITAMIN D3) 2000 UNITS tablet, Take  by mouth Daily., Disp: , Rfl:   •  gabapentin (NEURONTIN) 400 MG capsule, Take 1 capsule by mouth 3 (Three) Times a Day., Disp: 270 capsule, Rfl: 2  •  glucose blood (ONE TOUCH ULTRA TEST) test strip, Use as instructed THREE TIMES A DAY to test blood sugar, Disp: 150 each, Rfl: 4  •  insulin aspart (NOVOLOG FLEXPEN) 100 UNIT/ML solution pen-injector sc pen, Inject 5 Units under the skin into the appropriate area as directed 3 (Three) Times a Day With Meals., Disp: 15 mL, Rfl: 2  •  Insulin Pen Needle (BD PEN NEEDLE EDDI U/F) 32G X 4 MM misc, PATIENT TESTING 3-4 TIMES A DAY., Disp: 150 each, Rfl: 11  •  Iron, Ferrous Gluconate, 256 (28 Fe) MG tablet, 1 daily po (Patient taking differently: Take  by mouth Daily. 1 daily po), Disp:  30 tablet, Rfl: 3  •  lansoprazole (PREVACID) 30 MG capsule, Take 1 capsule by mouth Daily., Disp: 90 capsule, Rfl: 3  •  losartan (COZAAR) 100 MG tablet, Take 1 tablet by mouth Daily. 200001, Disp: 90 tablet, Rfl: 3  •  nitroglycerin (NITROSTAT) 0.4 MG SL tablet, Place 1 tablet under the tongue Every 5 (Five) Minutes As Needed for Chest Pain. Take no more than 3 doses in 15 minutes., Disp: 50 tablet, Rfl: 12  •  Probiotic Product (PROBIOTIC DAILY PO), Take 1 tablet by mouth Daily., Disp: , Rfl:   •  TOUJEO SOLOSTAR 300 UNIT/ML solution pen-injector, inject 25 units under the skin twice daily , Disp: 4.5 mL, Rfl: 2  •  traMADol (ULTRAM) 50 MG tablet, Take 1 tablet by mouth 2 (Two) Times a Day As Needed for Moderate Pain ., Disp: 40 tablet, Rfl: 0  •  traZODone (DESYREL) 50 MG tablet, Take 1 tablet by mouth every night at bedtime., Disp: 30 tablet, Rfl: 3  •  triamcinolone (KENALOG) 0.1 % ointment, Apply  topically to the appropriate area as directed 2 (Two) Times a Day., Disp: 15 g, Rfl: 0    The following portions of the patient's history were reviewed and updated as appropriate: allergies, current medications, past family history, past medical history, past social history, past surgical history and problem list.    Review of Systems   Constitutional: Negative.    Respiratory: Negative.    Cardiovascular: Negative.    Gastrointestinal: Negative.    Musculoskeletal: Negative.    Skin: Negative.         Lip lesion   Neurological: Negative.    Psychiatric/Behavioral: Negative.        Objective   Physical Exam   Constitutional: He is oriented to person, place, and time. He appears well-nourished.   Neck: Neck supple.   Cardiovascular: Normal rate, regular rhythm and normal heart sounds.   Pulmonary/Chest: Effort normal and breath sounds normal.   Abdominal: Bowel sounds are normal.   Neurological: He is alert and oriented to person, place, and time.   Skin: Skin is warm.   Lip lesion   Psychiatric: He has a normal mood  and affect.       All tests have been reviewed.    Assessment/Plan   Diagnoses and all orders for this visit:    Hyperlipidemia, unspecified hyperlipidemia type continue medication    Essential hypertensionContinue medication    Type 2 diabetes mellitus with complication, without long-term current use of insulin (CMS/[facility]) decrease Toujeo from 20 units to 15 units nightly    Inflammation of sacroiliac joint (CMS/[facility]) follow-up with pain clinic status post epidural    Edema of lower extremity encourage compression hose only 1+ edema    Lip lesion  -     Ambulatory Referral to Dermatology    Right pinky distal joint dorsal aspect possible rash initiate  triamcinolone (KENALOG) 0.1 % ointment; Apply  topically to the appropriate area as directed 2 (Two) Times a Day.             Below is to historical records for reference only:   Edema of lower extremities arcelia compression hose, 1+ now  Skin cancer follow-up with dermatologist  Memory loss Mini-Mental status examination showed a score of 29.  Continue to watch  Right 4th finger distal lig tear seen by hand surgeon no surgical indication. Local skin irritation start triamcinolone, if no better then diclofenac ointment  Type 2 diabetes mellitus continue med decrease tojeo to 15u from 20 novolog 5 u ac    Essential hypertension continue med   Radiculopathy, cervical  S/p shots and doing better,   Constipation , fiber probiotic, no diarrhea  lumbar disc surgery, 2004 L4-5.  LBP lumbar radiculopathy. continue Flexeril Mobic, MRI L2/3 mod to severe stenosis. continue follow-up with chiropractor, s/p ablation, follow up with pain clinic  tramadol  neuropathy,neurotin 400mg bid continue unable to tolerate lyrica  sacroiliac inflammation continue chiro. tylenol arthritis.  edema resolved after d/c amlodipine  Coronary artery disease a status post a CABG pleural effusion follow up chest x-ray continue cardio follow up, aldactone caused high potassium.  Anemia,Due  to gastritis per dr solorio continue PPI .  Bright red blood per rectum Hemoccult positive m. w/u lab done.  low ferritin sbft if still anemia. s/p colonoscopy3/2017:ileitis and s/p upper endoscopy:esophagitis, Hb low,  cont iron pill--  eczema refill medicine  diarrhea, metformin caused, resolved after d/c metformin  GERD hx of  surgery continue medicine  s/p EGD  Hyperlipidemia continue atorvastatin 40 mg   Peripheral neuropathy continue medications  tinnitus, loss of hearing  vision change see eye doctor  leg cramps skin lesions,   hydrocele chronic patient decline to see uro   sleep disturbances, continue trazodone  Td to 2010?, prevnar 13 done, zostavax done, refuse flushot. pneumovax done, shingrix informed  prostate ca s/p removal PSA=0.00, now urine hesitation seen by uro.   Sleep apnea, sleep study negative for sleep apnea  Constipation, fiber and water, metamucil improved      CT:gall stone, hydrocele and divertic       CKD watch       Td to HD      Gynecomastia and mamm and us done , prolactin slight high, other hormones normal. Watch for now--

## 2019-08-21 RX ORDER — CARVEDILOL 25 MG/1
TABLET ORAL
Qty: 180 TABLET | Refills: 0 | Status: SHIPPED | OUTPATIENT
Start: 2019-08-21 | End: 2019-11-19 | Stop reason: SDUPTHER

## 2019-08-21 RX ORDER — GABAPENTIN 400 MG/1
CAPSULE ORAL
Qty: 270 CAPSULE | Refills: 1 | Status: SHIPPED | OUTPATIENT
Start: 2019-08-21 | End: 2020-04-27

## 2019-10-14 RX ORDER — INSULIN GLARGINE 300 U/ML
INJECTION, SOLUTION SUBCUTANEOUS
Qty: 4.5 ML | Refills: 1 | Status: SHIPPED | OUTPATIENT
Start: 2019-10-14 | End: 2019-11-19 | Stop reason: SDUPTHER

## 2019-11-19 RX ORDER — CARVEDILOL 25 MG/1
TABLET ORAL
Qty: 180 TABLET | Refills: 0 | OUTPATIENT
Start: 2019-11-19

## 2019-11-19 RX ORDER — ATORVASTATIN CALCIUM 40 MG/1
TABLET, FILM COATED ORAL
Qty: 90 TABLET | Refills: 0 | OUTPATIENT
Start: 2019-11-19

## 2019-11-19 RX ORDER — CARVEDILOL 25 MG/1
TABLET ORAL
Qty: 180 TABLET | Refills: 0 | Status: SHIPPED | OUTPATIENT
Start: 2019-11-19 | End: 2020-02-17

## 2019-11-19 RX ORDER — INSULIN GLARGINE 300 U/ML
INJECTION, SOLUTION SUBCUTANEOUS
Qty: 4.5 ML | Refills: 0 | Status: SHIPPED | OUTPATIENT
Start: 2019-11-19 | End: 2020-02-17

## 2019-11-19 RX ORDER — ATORVASTATIN CALCIUM 40 MG/1
TABLET, FILM COATED ORAL
Qty: 90 TABLET | Refills: 0 | Status: SHIPPED | OUTPATIENT
Start: 2019-11-19 | End: 2020-02-17

## 2019-12-02 ENCOUNTER — TRANSCRIBE ORDERS (OUTPATIENT)
Dept: GENERAL RADIOLOGY | Facility: HOSPITAL | Age: 79
End: 2019-12-02

## 2019-12-02 ENCOUNTER — HOSPITAL ENCOUNTER (OUTPATIENT)
Dept: GENERAL RADIOLOGY | Facility: HOSPITAL | Age: 79
Discharge: HOME | End: 2019-12-02
Admitting: PAIN MEDICINE

## 2019-12-02 DIAGNOSIS — M16.12 OSTEOARTHRITIS OF LEFT HIP, UNSPECIFIED OSTEOARTHRITIS TYPE: ICD-10-CM

## 2019-12-02 DIAGNOSIS — M16.11 OSTEOARTHRITIS OF RIGHT HIP, UNSPECIFIED OSTEOARTHRITIS TYPE: ICD-10-CM

## 2019-12-02 DIAGNOSIS — M16.12 OSTEOARTHRITIS OF LEFT HIP, UNSPECIFIED OSTEOARTHRITIS TYPE: Primary | ICD-10-CM

## 2019-12-02 PROCEDURE — 73522 X-RAY EXAM HIPS BI 3-4 VIEWS: CPT

## 2019-12-04 RX ORDER — INSULIN ASPART 100 [IU]/ML
INJECTION, SOLUTION INTRAVENOUS; SUBCUTANEOUS
Qty: 15 ML | Refills: 0 | Status: SHIPPED | OUTPATIENT
Start: 2019-12-04 | End: 2020-03-16

## 2019-12-16 ENCOUNTER — OFFICE VISIT (OUTPATIENT)
Dept: INTERNAL MEDICINE | Facility: CLINIC | Age: 79
End: 2019-12-16

## 2019-12-16 VITALS
SYSTOLIC BLOOD PRESSURE: 129 MMHG | RESPIRATION RATE: 18 BRPM | OXYGEN SATURATION: 98 % | TEMPERATURE: 97.9 F | BODY MASS INDEX: 25.71 KG/M2 | DIASTOLIC BLOOD PRESSURE: 58 MMHG | HEIGHT: 73 IN | WEIGHT: 194 LBS | HEART RATE: 58 BPM

## 2019-12-16 DIAGNOSIS — M25.551 PAIN OF BOTH HIP JOINTS: ICD-10-CM

## 2019-12-16 DIAGNOSIS — R19.5 NARROWING OF STOOLS: Primary | ICD-10-CM

## 2019-12-16 DIAGNOSIS — R32 URINARY INCONTINENCE, UNSPECIFIED TYPE: ICD-10-CM

## 2019-12-16 DIAGNOSIS — M25.552 PAIN OF BOTH HIP JOINTS: ICD-10-CM

## 2019-12-16 DIAGNOSIS — M54.2 NECK PAIN: ICD-10-CM

## 2019-12-16 DIAGNOSIS — N43.3 HYDROCELE, UNSPECIFIED HYDROCELE TYPE: ICD-10-CM

## 2019-12-16 LAB
BILIRUB BLD-MCNC: NEGATIVE MG/DL
CLARITY, POC: CLEAR
COLOR UR: NORMAL
GLUCOSE UR STRIP-MCNC: NEGATIVE MG/DL
KETONES UR QL: NEGATIVE
LEUKOCYTE EST, POC: NEGATIVE
NITRITE UR-MCNC: NEGATIVE MG/ML
PH UR: 6 [PH] (ref 5–8)
PROT UR STRIP-MCNC: NEGATIVE MG/DL
RBC # UR STRIP: NEGATIVE /UL
SP GR UR: 1.02 (ref 1–1.03)
UROBILINOGEN UR QL: NORMAL

## 2019-12-16 PROCEDURE — 81003 URINALYSIS AUTO W/O SCOPE: CPT | Performed by: INTERNAL MEDICINE

## 2019-12-16 PROCEDURE — 99214 OFFICE O/P EST MOD 30 MIN: CPT | Performed by: INTERNAL MEDICINE

## 2019-12-16 NOTE — PROGRESS NOTES
Subjective   Tristan Haines is a 79 y.o. male.     Chief Complaint   Patient presents with   • Follow-up     has questions about stool and personal, has had some imaging and want it looked at       History of Present Illness   Hip pain and sacral pain and +SIJ arthritis, also neck pain patient is painting a lot. Now still neck pain and no neck pain. Also c/o stool narrow recently and soft. Last colonoscopy was 4/2017.  Patient also complains urinary incontinence and scrotal cyst is getting uncomfortable.    Current Outpatient Medications:   •  aspirin 81 MG tablet, Take 81 mg by mouth Daily., Disp: , Rfl:   •  atorvastatin (LIPITOR) 40 MG tablet, TAKE 1 TABLET BY MOUTH ONE TIME A DAY , Disp: 90 tablet, Rfl: 0  •  betamethasone dipropionate (DIPROLENE) 0.05 % cream, APPLY TO AFFECTED AREA ONCE DAILY, Disp: 45 g, Rfl: 2  •  carvedilol (COREG) 25 MG tablet, TAKE 1 TABLET BY MOUTH TWO TIMES A DAY , Disp: 180 tablet, Rfl: 0  •  Cholecalciferol (VITAMIN D3) 2000 UNITS tablet, Take  by mouth Daily., Disp: , Rfl:   •  gabapentin (NEURONTIN) 400 MG capsule, TAKE 1 CAPSULE BY MOUTH THREE TIMES A DAY , Disp: 270 capsule, Rfl: 1  •  glucose blood (ONE TOUCH ULTRA TEST) test strip, Use as instructed THREE TIMES A DAY to test blood sugar, Disp: 150 each, Rfl: 4  •  Insulin Pen Needle (BD PEN NEEDLE EDDI U/F) 32G X 4 MM misc, PATIENT TESTING 3-4 TIMES A DAY., Disp: 150 each, Rfl: 11  •  Iron, Ferrous Gluconate, 256 (28 Fe) MG tablet, 1 daily po (Patient taking differently: Take  by mouth Daily. 1 daily po), Disp: 30 tablet, Rfl: 3  •  lansoprazole (PREVACID) 30 MG capsule, Take 1 capsule by mouth Daily., Disp: 90 capsule, Rfl: 3  •  losartan (COZAAR) 100 MG tablet, Take 1 tablet by mouth Daily. 200001, Disp: 90 tablet, Rfl: 3  •  nitroglycerin (NITROSTAT) 0.4 MG SL tablet, Place 1 tablet under the tongue Every 5 (Five) Minutes As Needed for Chest Pain. Take no more than 3 doses in 15 minutes., Disp: 50 tablet, Rfl: 12  •  NOVOLOG  FLEXPEN 100 UNIT/ML solution pen-injector sc pen, inject 5 units under the skin into the apropriate area three times a day, with meals, as directed  (Patient taking differently: 6 Units 2 (Two) Times a Day. And 3rd one if needed), Disp: 15 mL, Rfl: 0  •  Probiotic Product (PROBIOTIC DAILY PO), Take 1 tablet by mouth Daily., Disp: , Rfl:   •  TOUJEO SOLOSTAR 300 UNIT/ML solution pen-injector injection, INJECT 25 UNITS UNDER THE SKIN TWO TIMES A DAY  (Patient taking differently: 15 Units 2 (Two) Times a Day.), Disp: 4.5 mL, Rfl: 0  •  traMADol (ULTRAM) 50 MG tablet, Take 1 tablet by mouth 2 (Two) Times a Day As Needed for Moderate Pain ., Disp: 40 tablet, Rfl: 0  •  traZODone (DESYREL) 50 MG tablet, Take 1 tablet by mouth every night at bedtime. (Patient taking differently: Take 50 mg by mouth every night at bedtime. Pt only takes 1/4 tablet), Disp: 30 tablet, Rfl: 3  •  triamcinolone (KENALOG) 0.1 % ointment, Apply  topically to the appropriate area as directed 2 (Two) Times a Day., Disp: 15 g, Rfl: 0    The following portions of the patient's history were reviewed and updated as appropriate: allergies, current medications, past family history, past medical history, past social history, past surgical history and problem list.    Review of Systems   Constitutional: Negative.    Respiratory: Negative.    Cardiovascular: Negative.    Gastrointestinal: Negative.    Musculoskeletal: Positive for back pain and neck pain.   Skin: Negative.    Neurological: Negative.    Psychiatric/Behavioral: Negative.        Objective   Physical Exam   Constitutional: He is oriented to person, place, and time. He appears well-developed and well-nourished.   Neck: Neck supple.   Cardiovascular: Normal rate, regular rhythm and normal heart sounds.   Pulmonary/Chest: Effort normal and breath sounds normal.   Abdominal: Bowel sounds are normal.   Genitourinary:   Genitourinary Comments: Hydrocele    Neurological: He is alert and oriented to  person, place, and time.   Skin: Skin is warm.   Psychiatric: He has a normal mood and affect.       All tests have been reviewed.    Assessment/Plan   Diagnoses and all orders for this visit:    Narrowing of stools  -     Ambulatory Referral to Gastroenterology    Urinary incontinence, unspecified type  -     Ambulatory Referral to Urology    Neck pain no pain today continue to watch encourage patient to do home physical therapy range of motion.    Pain of both hip joints continue follow-up with pain doctor    Hydrocele, unspecified hydrocele type  -     Cancel: Ambulatory Referral to Urology  -     Ambulatory Referral to Urology      Follow-up as scheduled

## 2020-01-07 ENCOUNTER — OFFICE VISIT (OUTPATIENT)
Dept: UROLOGY | Facility: CLINIC | Age: 80
End: 2020-01-07

## 2020-01-07 VITALS
WEIGHT: 194 LBS | SYSTOLIC BLOOD PRESSURE: 128 MMHG | HEIGHT: 73 IN | BODY MASS INDEX: 25.71 KG/M2 | RESPIRATION RATE: 18 BRPM | OXYGEN SATURATION: 98 % | HEART RATE: 96 BPM | TEMPERATURE: 98.4 F | DIASTOLIC BLOOD PRESSURE: 80 MMHG

## 2020-01-07 DIAGNOSIS — R32 URINARY INCONTINENCE, UNSPECIFIED TYPE: Primary | ICD-10-CM

## 2020-01-07 LAB
BILIRUB BLD-MCNC: ABNORMAL MG/DL
CLARITY, POC: CLEAR
COLOR UR: YELLOW
GLUCOSE UR STRIP-MCNC: NEGATIVE MG/DL
KETONES UR QL: NEGATIVE
LEUKOCYTE EST, POC: NEGATIVE
NITRITE UR-MCNC: NEGATIVE MG/ML
PH UR: 6 [PH] (ref 5–8)
PROT UR STRIP-MCNC: NEGATIVE MG/DL
RBC # UR STRIP: NEGATIVE /UL
SP GR UR: 1.02 (ref 1–1.03)
UROBILINOGEN UR QL: NORMAL

## 2020-01-07 PROCEDURE — 99204 OFFICE O/P NEW MOD 45 MIN: CPT | Performed by: UROLOGY

## 2020-01-07 PROCEDURE — 51798 US URINE CAPACITY MEASURE: CPT | Performed by: UROLOGY

## 2020-01-07 PROCEDURE — 81003 URINALYSIS AUTO W/O SCOPE: CPT | Performed by: UROLOGY

## 2020-01-07 NOTE — PROGRESS NOTES
Chief Complaint  LUTS    Referring Provider  Cayden Chiang MD    HPI  Mr. Haines is a 79 y.o. male with history of prostatectomy 1995 in [place] who presents with LUTS and hydrocele.  Primary symptom includes: UUI /nighttime leakage of urine  Patient denies dysuria or hematuria.  He denies stress urinary incontinence  Onset was greater than 12 months ago.    Previous treatments include: None    Denies daytime UUI or ALLAN    IPSS Questionnaire (AUA-7):  Over the past month…    1)  Incomplete Emptying  How often have you had a sensation of not emptying your bladder?  5 - Almost always   2)  Frequency  How often have you had to urinate less than every two hours? 3 - About half the time   3)  Intermittency  How often have you found you stopped and started again several times when you urinated?  5 - Almost always   4) Urgency  How often have you found it difficult to postpone urination?  2 - Less than half the time   5) Weak Stream  How often have you had a weak urinary stream?  2 - Less than half the time   6) Straining  How often have you had to push or strain to begin urination?  4 - More than half the time   7) Nocturia  How many times did you typically get up at night to urinate?  4    Total Score:  25       Quality of life due to urinary symptoms:  If you were to spend the rest of your life with your urinary condition the way it is now, how would you feel about that? 3-Mixed   Urine Leakage (Incontinence) 2-Mild (More than a few drops a day, 1-2 pads/day)       Past Medical History  Past Medical History:   Diagnosis Date   • Abdominal pain     Hx of.   • Atypical chest pain     Hx of. LAST TIME WAS IN 2017 AND SEEN DR LÓPEZ   • Colon polyps    • COPD (chronic obstructive pulmonary disease) (CMS/[facility])    • Coronary artery disease    • Diabetes mellitus (CMS/[facility])    • Diarrhea     diarrhea, metformin caused, resolved after d/c metformin   • Eczema     Requesting ointment   • Edema of leg    • GERD  "(gastroesophageal reflux disease)    • Hernia     heietal hernia   • History of exercise stress test 2016   • History of kidney stones     ALL PASSED WITHOUT INTERVENTION   • History of nuclear stress test 2016   • Leg cramps    • Neck pain    • Nephrolithiasis    • Prostate cancer (CMS/[facility])    • Skin lesions    • SOB (shortness of breath)     HISTORY OF. 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 positive    • Vitamin D deficiency        Past Surgical History  Past Surgical History:   Procedure Laterality Date   • APPENDECTOMY     • BACK SURGERY  2003    REMOVED BULGING DISC THAT BROKE OFF   • COLONOSCOPY      2017-Chris   • CORONARY ARTERY BYPASS GRAFT  11/2014     MAKER. THEREAFTER, FOUND AN ARTERY WAS KNICKED AND PATIENT TAKEN BACK TO SURGERY TO REPAIR   • ENDOSCOPY     • ENDOSCOPY N/A 8/31/2018    Procedure: ESOPHAGOGASTRODUODENOSCOPY WITH BIOPSY;  Surgeon: Vicky Perez MD;  Location: [facility] ENDOSCOPY;  Service: Gastroenterology   • EYE SURGERY Right     CATARACT   • HERNIA REPAIR  1989    Hiatal hernia   • PROSTATE SURGERY      Prostate cancer s/p removal do PSA   • PROSTATECTOMY     • SKIN BIOPSY      HAVE HAD OVER 200 SPOTS REMOVED OF PRE-MELANOMA   • SMALL INTESTINE SURGERY  1997    REMOVED 12 INCHES OF INTESTINE \"DUE TO NITROGEN BUBBLES FORMING IN INTESTINAL WALLS CAUSING BLISTERS\"       Medications    Current Outpatient Medications:   •  aspirin 81 MG tablet, Take 81 mg by mouth Daily., Disp: , Rfl:   •  atorvastatin (LIPITOR) 40 MG tablet, TAKE 1 TABLET BY MOUTH ONE TIME A DAY , Disp: 90 tablet, Rfl: 0  •  betamethasone dipropionate (DIPROLENE) 0.05 % cream, APPLY TO AFFECTED AREA ONCE DAILY, Disp: 45 g, Rfl: 2  •  carvedilol (COREG) 25 MG tablet, TAKE 1 TABLET BY MOUTH TWO TIMES A DAY , Disp: 180 tablet, Rfl: 0  •  Cholecalciferol (VITAMIN D3) 2000 UNITS tablet, Take  by mouth Daily., Disp: , Rfl:   •  gabapentin (NEURONTIN) 400 MG capsule, TAKE 1 CAPSULE BY MOUTH THREE TIMES A " DAY , Disp: 270 capsule, Rfl: 1  •  glucose blood (ONE TOUCH ULTRA TEST) test strip, Use as instructed THREE TIMES A DAY to test blood sugar, Disp: 150 each, Rfl: 4  •  Insulin Pen Needle (BD PEN NEEDLE EDDI U/F) 32G X 4 MM misc, PATIENT TESTING 3-4 TIMES A DAY., Disp: 150 each, Rfl: 11  •  Iron, Ferrous Gluconate, 256 (28 Fe) MG tablet, 1 daily po (Patient taking differently: Take  by mouth Daily. 1 daily po), Disp: 30 tablet, Rfl: 3  •  lansoprazole (PREVACID) 30 MG capsule, Take 1 capsule by mouth Daily., Disp: 90 capsule, Rfl: 3  •  losartan (COZAAR) 100 MG tablet, Take 1 tablet by mouth Daily. 200001, Disp: 90 tablet, Rfl: 3  •  nitroglycerin (NITROSTAT) 0.4 MG SL tablet, Place 1 tablet under the tongue Every 5 (Five) Minutes As Needed for Chest Pain. Take no more than 3 doses in 15 minutes., Disp: 50 tablet, Rfl: 12  •  NOVOLOG FLEXPEN 100 UNIT/ML solution pen-injector sc pen, inject 5 units under the skin into the apropriate area three times a day, with meals, as directed  (Patient taking differently: 6 Units 2 (Two) Times a Day. And 3rd one if needed), Disp: 15 mL, Rfl: 0  •  Probiotic Product (PROBIOTIC DAILY PO), Take 1 tablet by mouth Daily., Disp: , Rfl:   •  TOUJEO SOLOSTAR 300 UNIT/ML solution pen-injector injection, INJECT 25 UNITS UNDER THE SKIN TWO TIMES A DAY  (Patient taking differently: 15 Units 2 (Two) Times a Day.), Disp: 4.5 mL, Rfl: 0  •  traMADol (ULTRAM) 50 MG tablet, Take 1 tablet by mouth 2 (Two) Times a Day As Needed for Moderate Pain ., Disp: 40 tablet, Rfl: 0  •  traZODone (DESYREL) 50 MG tablet, Take 1 tablet by mouth every night at bedtime. (Patient taking differently: Take 50 mg by mouth every night at bedtime. Pt only takes 1/4 tablet), Disp: 30 tablet, Rfl: 3  •  triamcinolone (KENALOG) 0.1 % ointment, Apply  topically to the appropriate area as directed 2 (Two) Times a Day., Disp: 15 g, Rfl: 0    Allergies  No Known Allergies    Social History  Social History     Socioeconomic  "History   • Marital status:      Spouse name: Not on file   • Number of children: Not on file   • Years of education: Not on file   • Highest education level: Not on file   Tobacco Use   • Smoking status: Never Smoker   • Smokeless tobacco: Never Used   Substance and Sexual Activity   • Alcohol use: No   • Drug use: No   • Sexual activity: Defer       Family History  He has no family history of prostate or kidney cancer  He has no family history of kidney stones    Review of Systems  Constitutional: No fevers or chills  Skin: Negative for rash  Endocrine: No heat/cold intolerance   Cardiovascular: Negative for chest pain or dyspnea on exertion  Respiratory: Negative for shortness of breath or wheezing  Gastrointestinal: No nausea or vomiting  Genitourinary: Negative for current gross hematuria.  Musculoskeletal: No flank pain  Neurological:  Negative for frequent headaches or dizziness  Lymph/Heme: Negative for leg swelling or calf pain.    Physical Exam  Visit Vitals  /80   Pulse 96   Temp 98.4 °F (36.9 °C)   Resp 18   Ht 185.4 cm (72.99\")   Wt 88 kg (194 lb)   SpO2 98%   BMI 25.60 kg/m²     Constitutional: NAD, WDWN.   HEENT: NCAT. Conjunctivae normal.  MMM.    Cardiovascular: Regular rate.  Pulmonary/Chest: Respirations are even and non-labored bilaterally.  Abdominal: Soft. No distension, tenderness, masses or guarding. No CVA tenderness.  Neurological: A + O x 3.  Cranial Nerves II-XII grossly intact. Normal gait.  Extremities: JOVANI x 4, Warm. No clubbing.  No cyanosis.    Skin: Pink, warm and dry.  No rashes noted.  Psychiatric:  Normal mood and affect    Genitourinary  Deferred    Labs  Lab Results   Component Value Date    PSA <0.1 01/18/2019    PSA <0.064 09/20/2017    PSA 0.00 05/13/2015       Brief Urine Lab Results  (Last result in the past 365 days)      Color   Clarity   Blood   Leuk Est   Nitrite   Protein   CREAT   Urine HCG        01/07/20 1128 Yellow Clear Negative Negative Negative " Negative               PVR  Post-void residual performed by staff - 75      Assessment  Mr. Haines is a 79 y.o. male who presents with LUTS, possibly UUI vs nighttime ALLAN.    Plan  1.  FU for cystoscopy, eval of ALLAN. May need UDS  2.  If he does not have bladder neck contracture any pathology identified on cystoscopy and genital exam, we may consider either a condom catheter or pelvic floor physical therapy.  3.  Discuss erectile dysfunction at next visit      Chung Oliva MD

## 2020-01-15 ENCOUNTER — OFFICE VISIT (OUTPATIENT)
Dept: GASTROENTEROLOGY | Facility: CLINIC | Age: 80
End: 2020-01-15

## 2020-01-15 VITALS
WEIGHT: 203 LBS | TEMPERATURE: 98.2 F | BODY MASS INDEX: 27.5 KG/M2 | SYSTOLIC BLOOD PRESSURE: 149 MMHG | RESPIRATION RATE: 18 BRPM | HEART RATE: 64 BPM | DIASTOLIC BLOOD PRESSURE: 69 MMHG | HEIGHT: 72 IN

## 2020-01-15 DIAGNOSIS — K92.1 HEMATOCHEZIA: ICD-10-CM

## 2020-01-15 DIAGNOSIS — K21.9 GASTROESOPHAGEAL REFLUX DISEASE WITHOUT ESOPHAGITIS: ICD-10-CM

## 2020-01-15 DIAGNOSIS — R19.4 CHANGE IN BOWEL HABITS: Primary | ICD-10-CM

## 2020-01-15 DIAGNOSIS — K64.8 INTERNAL HEMORRHOIDS: ICD-10-CM

## 2020-01-15 DIAGNOSIS — K59.04 CHRONIC IDIOPATHIC CONSTIPATION: ICD-10-CM

## 2020-01-15 PROCEDURE — 99204 OFFICE O/P NEW MOD 45 MIN: CPT | Performed by: INTERNAL MEDICINE

## 2020-01-15 RX ORDER — METOCLOPRAMIDE 10 MG/1
10 TABLET ORAL ONCE
Qty: 1 TABLET | Refills: 0 | Status: SHIPPED | OUTPATIENT
Start: 2020-01-15 | End: 2020-01-15

## 2020-01-15 RX ORDER — SODIUM CHLORIDE 9 MG/ML
70 INJECTION, SOLUTION INTRAVENOUS CONTINUOUS PRN
Status: CANCELLED | OUTPATIENT
Start: 2020-01-15

## 2020-01-15 RX ORDER — BISACODYL 5 MG/1
20 TABLET, DELAYED RELEASE ORAL ONCE
Qty: 4 TABLET | Refills: 0 | Status: SHIPPED | OUTPATIENT
Start: 2020-01-15 | End: 2020-01-15

## 2020-01-15 RX ORDER — POLYETHYLENE GLYCOL 3350 17 G/17G
17 POWDER, FOR SOLUTION ORAL DAILY
Qty: 510 G | Refills: 2 | Status: SHIPPED | OUTPATIENT
Start: 2020-01-15

## 2020-01-15 NOTE — PROGRESS NOTES
New Patient Consult      Date: 01/15/2020   Patient Name: Tristan Haines  MRN: [id number]  : 1940     Referring Physician: Cayden Chiang MD    Chief Complaint   Patient presents with   • Narrowing of the Stool       History of Present Illness: Tristan Haines is a 80 y.o. male who is here today to establish care with Gastroenterology for evaluation of change in bowel habit since a year. Complains of pellet like stool firm, small bowl movement. Has to strain a lot but BM daily. BM once in 1-2 days. Takes OTC stool softener.  There is no associated rectal pain, hematochezia or melena. No associated abdominal pain or distension. 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   The patient has a history of constipation off and on for the the last many years. There is an associated hematochezia. Blood in wipes mainly.  No associated abdominal pain or distension.   Pt denies nause vomiting or odynophagia or dysphagia. Has history of acid reflux on lansoprazole. There is no history of liver or pancreatic disease. He had low HGB before but last HGB was 14g dl.  There is no history of anemia. No prior recent history of EGD.   Last colonoscopy was 2017 and had terminal ileal ulcer. Path most likely NSAID induced ulcer. Has prio h/o colon polyp, No polyps in last colonoscopy. No family history of colon cancer or any GI malignancy.         Subjective      Past Medical History:   Past Medical History:   Diagnosis Date   • Abdominal pain     Hx of.   • Atypical chest pain     Hx of. LAST TIME WAS IN 2017 AND SEEN DR LÓPEZ   • Colon polyps    • COPD (chronic obstructive pulmonary disease) (CMS/HCC)    • Coronary artery disease    • Diabetes mellitus (CMS/HCC)    • Diarrhea     diarrhea, metformin caused, resolved after d/c metformin   • Eczema     Requesting ointment   • Edema of leg    • GERD (gastroesophageal reflux disease)    • Hernia     heietal hernia   • History of exercise stress  "test 2016   • History of kidney stones     ALL PASSED WITHOUT INTERVENTION   • History of nuclear stress test 2016   • Leg cramps    • Neck pain    • Nephrolithiasis    • Prostate cancer (CMS/HCC)    • Skin lesions    • SOB (shortness of breath)     HISTORY OF. 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 positive    • Vitamin D deficiency        Past Surgical History:   Past Surgical History:   Procedure Laterality Date   • APPENDECTOMY     • BACK SURGERY  2003    REMOVED BULGING DISC THAT BROKE OFF   • COLONOSCOPY      2017-[facility]   • CORONARY ARTERY BYPASS GRAFT  11/2014     MAKER. THEREAFTER, FOUND AN ARTERY WAS KNICKED AND PATIENT TAKEN BACK TO SURGERY TO REPAIR   • ENDOSCOPY     • ENDOSCOPY N/A 8/31/2018    Procedure: ESOPHAGOGASTRODUODENOSCOPY WITH BIOPSY;  Surgeon: Vicky Perez MD;  Location: [facility] ENDOSCOPY;  Service: Gastroenterology   • EYE SURGERY Right     CATARACT   • HERNIA REPAIR  1989    Hiatal hernia   • PROSTATE SURGERY      Prostate cancer s/p removal do PSA   • PROSTATECTOMY     • SKIN BIOPSY      HAVE HAD OVER 200 SPOTS REMOVED OF PRE-MELANOMA   • SMALL INTESTINE SURGERY  1997    REMOVED 12 INCHES OF INTESTINE \"DUE TO NITROGEN BUBBLES FORMING IN INTESTINAL WALLS CAUSING BLISTERS\"       Family History:   Family History   Problem Relation Age of Onset   • Cancer Mother    • Diabetes Father    • Hypertension Father    • Liver disease Father    • Cancer Sister    • Diabetes Brother    • Heart attack Other         grandparent   • Arthritis Other    • Heart attack Other        Social History:   Social History     Socioeconomic History   • Marital status:      Spouse name: Not on file   • Number of children: Not on file   • Years of education: Not on file   • Highest education level: Not on file   Tobacco Use   • Smoking status: Never Smoker   • Smokeless tobacco: Never Used   Substance and Sexual Activity   • Alcohol use: No   • Drug use: No   • Sexual activity: Defer "         Current Outpatient Medications:   •  aspirin 81 MG tablet, Take 81 mg by mouth Daily., Disp: , Rfl:   •  atorvastatin (LIPITOR) 40 MG tablet, TAKE 1 TABLET BY MOUTH ONE TIME A DAY , Disp: 90 tablet, Rfl: 0  •  betamethasone dipropionate (DIPROLENE) 0.05 % cream, APPLY TO AFFECTED AREA ONCE DAILY, Disp: 45 g, Rfl: 2  •  carvedilol (COREG) 25 MG tablet, TAKE 1 TABLET BY MOUTH TWO TIMES A DAY , Disp: 180 tablet, Rfl: 0  •  Cholecalciferol (VITAMIN D3) 2000 UNITS tablet, Take  by mouth Daily., Disp: , Rfl:   •  gabapentin (NEURONTIN) 400 MG capsule, TAKE 1 CAPSULE BY MOUTH THREE TIMES A DAY , Disp: 270 capsule, Rfl: 1  •  glucose blood (ONE TOUCH ULTRA TEST) test strip, Use as instructed THREE TIMES A DAY to test blood sugar, Disp: 150 each, Rfl: 4  •  Insulin Pen Needle (BD PEN NEEDLE EDDI U/F) 32G X 4 MM misc, PATIENT TESTING 3-4 TIMES A DAY., Disp: 150 each, Rfl: 11  •  Iron, Ferrous Gluconate, 256 (28 Fe) MG tablet, 1 daily po (Patient taking differently: Take  by mouth Daily. 1 daily po), Disp: 30 tablet, Rfl: 3  •  lansoprazole (PREVACID) 30 MG capsule, Take 1 capsule by mouth Daily., Disp: 90 capsule, Rfl: 3  •  losartan (COZAAR) 100 MG tablet, Take 1 tablet by mouth Daily. 200001, Disp: 90 tablet, Rfl: 3  •  nitroglycerin (NITROSTAT) 0.4 MG SL tablet, Place 1 tablet under the tongue Every 5 (Five) Minutes As Needed for Chest Pain. Take no more than 3 doses in 15 minutes., Disp: 50 tablet, Rfl: 12  •  NOVOLOG FLEXPEN 100 UNIT/ML solution pen-injector sc pen, inject 5 units under the skin into the apropriate area three times a day, with meals, as directed  (Patient taking differently: 6 Units 2 (Two) Times a Day. And 3rd one if needed), Disp: 15 mL, Rfl: 0  •  Probiotic Product (PROBIOTIC DAILY PO), Take 1 tablet by mouth Daily., Disp: , Rfl:   •  TOUJEO SOLOSTAR 300 UNIT/ML solution pen-injector injection, INJECT 25 UNITS UNDER THE SKIN TWO TIMES A DAY  (Patient taking differently: 15 Units 2 (Two) Times  a Day.), Disp: 4.5 mL, Rfl: 0  •  traMADol (ULTRAM) 50 MG tablet, Take 1 tablet by mouth 2 (Two) Times a Day As Needed for Moderate Pain ., Disp: 40 tablet, Rfl: 0  •  traZODone (DESYREL) 50 MG tablet, Take 1 tablet by mouth every night at bedtime. (Patient taking differently: Take 50 mg by mouth every night at bedtime. Pt only takes 1/4 tablet), Disp: 30 tablet, Rfl: 3  •  triamcinolone (KENALOG) 0.1 % ointment, Apply  topically to the appropriate area as directed 2 (Two) Times a Day., Disp: 15 g, Rfl: 0  •  bisacodyl (DULCOLAX) 5 MG EC tablet, Take 4 tablets by mouth 1 (One) Time for 1 dose. Please see prep instructions from office., Disp: 4 tablet, Rfl: 0  •  metoclopramide (REGLAN) 10 MG tablet, Take 1 tablet by mouth 1 (One) Time for 1 dose. Please see prep instructions from office., Disp: 1 tablet, Rfl: 0  •  polyethylene glycol (GoLYTELY) 236 g solution, Take 4,000 mL by mouth 1 (One) Time for 1 dose. Please see prep instructions from office., Disp: 4000 mL, Rfl: 0  •  polyethylene glycol (MIRALAX) powder, Take 17 g by mouth Daily., Disp: 510 g, Rfl: 2    No Known Allergies    Review of Systems:   Review of Systems   Constitutional: Negative for appetite change, fatigue, fever and unexpected weight loss.   Respiratory: Negative for cough, shortness of breath and wheezing.    Cardiovascular: Negative for chest pain, palpitations and leg swelling.   Gastrointestinal: Positive for anal bleeding, constipation, GERD and indigestion. Negative for abdominal distention, abdominal pain, blood in stool, diarrhea, nausea, rectal pain and vomiting.   Genitourinary: Negative for dysuria, frequency and hematuria.   Musculoskeletal: Negative for back pain and joint swelling.   Skin: Negative for color change, rash and skin lesions.   Neurological: Negative for dizziness, syncope, speech difficulty, weakness, headache and memory problem.   Hematological: Negative for adenopathy. Does not bruise/bleed easily.  "  Psychiatric/Behavioral: Negative for agitation, behavioral problems, suicidal ideas and depressed mood.       The following portions of the patient's history were reviewed and updated as appropriate: allergies, current medications, past family history, past medical history, past social history, past surgical history and problem list.    Objective     Physical Exam:  Vital Signs:   Vitals:    01/15/20 1346   BP: 149/69   Pulse: 64   Resp: 18   Temp: 98.2 °F (36.8 °C)   TempSrc: Temporal   Weight: 92.1 kg (203 lb)   Height: 182.9 cm (72\")       Physical Exam   Constitutional: He is oriented to person, place, and time. Vital signs are normal. He appears well-developed and well-nourished.   HENT:   Head: Normocephalic and atraumatic.   Right Ear: External ear normal.   Left Ear: External ear normal.   Nose: Nose normal.   Mouth/Throat: Oropharynx is clear and moist.   Eyes: Conjunctivae are normal.   Neck: Normal range of motion. Neck supple.   Cardiovascular: Normal rate, regular rhythm and normal heart sounds.   Pulmonary/Chest: Effort normal and breath sounds normal.   Abdominal: Soft. Bowel sounds are normal. He exhibits no distension, no ascites and no mass. There is no tenderness.   Musculoskeletal: Normal range of motion. He exhibits edema.   Neurological: He is alert and oriented to person, place, and time.   Skin: Skin is warm and dry.   Psychiatric: He has a normal mood and affect. His behavior is normal. Judgment normal.   Nursing note and vitals reviewed.      Results Review:   I reviewed the patient's new clinical results.    Assessment / Plan      Assessment & Plan:  1. Change in bowel habits  Recent change in bowel habit. Mostly constipated. Occasional red blood in stool  And wipe.   Last colonoscopy 2017 showed TI ulcer, path benign. His symptoms appears to be from hemorriods to r/u other pathology.  Given his alarming symptoms will schedule for colposcopy.  - Case Request; Standing  - sodium chloride " 0.9 % infusion  - Case Request    2. Hematochezia  Occasional in wipes  And stool, hemorrhoidal vs other GI pathlogy to rule out.   - Case Request; Standing  - sodium chloride 0.9 % infusion  - Case Request    3. Chronic idiopathic constipation  On OTC stool softener. Will add Miralax po daily.     4. Gastroesophageal reflux disease without esophagitis  On low dose PPI to continue    5. Internal hemorrhoids  No flare now. Use preparation H as needed      Follow Up:   No follow-ups on file.    Maksim Peguero MD  [facility] [place]  1/15/2020  2:23 PM    Please note that portions of this note may have been completed with a voice recognition program. Efforts were made to edit the dictations, but occasionally words are mistranscribed.

## 2020-01-16 PROBLEM — K92.1 HEMATOCHEZIA: Status: ACTIVE | Noted: 2020-01-16

## 2020-01-16 PROBLEM — R19.4 CHANGE IN BOWEL HABITS: Status: ACTIVE | Noted: 2020-01-16

## 2020-01-24 ENCOUNTER — PROCEDURE VISIT (OUTPATIENT)
Dept: UROLOGY | Facility: CLINIC | Age: 80
End: 2020-01-24

## 2020-01-24 VITALS — WEIGHT: 203 LBS | BODY MASS INDEX: 27.5 KG/M2 | HEIGHT: 72 IN | RESPIRATION RATE: 18 BRPM

## 2020-01-24 DIAGNOSIS — R39.9 LOWER URINARY TRACT SYMPTOMS (LUTS): Primary | ICD-10-CM

## 2020-01-24 PROCEDURE — 52000 CYSTOURETHROSCOPY: CPT | Performed by: UROLOGY

## 2020-01-24 PROCEDURE — 51741 ELECTRO-UROFLOWMETRY FIRST: CPT | Performed by: UROLOGY

## 2020-01-24 NOTE — PROGRESS NOTES
Chief Complaint  LUTS    HPI  Mr. Haines is a 80 y.o. male with history of prostatectomy 1995 in [place] who presents with LUTS and hydrocele.  Primary symptom includes: UUI /nighttime leakage of urine  Patient denies dysuria or hematuria.  He denies stress urinary incontinence  Onset was greater than 12 months ago.    Previous treatments include: None    Denies daytime UUI or ALLAN    IPSS Questionnaire (AUA-7):  Over the past month…    1)  Incomplete Emptying  How often have you had a sensation of not emptying your bladder?  5 - Almost always   2)  Frequency  How often have you had to urinate less than every two hours? 3 - About half the time   3)  Intermittency  How often have you found you stopped and started again several times when you urinated?  5 - Almost always   4) Urgency  How often have you found it difficult to postpone urination?  2 - Less than half the time   5) Weak Stream  How often have you had a weak urinary stream?  2 - Less than half the time   6) Straining  How often have you had to push or strain to begin urination?  4 - More than half the time   7) Nocturia  How many times did you typically get up at night to urinate?  4    Total Score:  25       Quality of life due to urinary symptoms:  If you were to spend the rest of your life with your urinary condition the way it is now, how would you feel about that? 3-Mixed   Urine Leakage (Incontinence) 2-Mild (More than a few drops a day, 1-2 pads/day)       Past Medical History  Past Medical History:   Diagnosis Date   • Abdominal pain     Hx of.   • Atypical chest pain     Hx of. LAST TIME WAS IN 2017 AND SEEN DR LÓPEZ   • Colon polyps    • COPD (chronic obstructive pulmonary disease) (CMS/[facility])    • Coronary artery disease    • Diabetes mellitus (CMS/[facility])    • Diarrhea     diarrhea, metformin caused, resolved after d/c metformin   • Eczema     Requesting ointment   • Edema of leg    • GERD (gastroesophageal reflux disease)    • Hernia      "heietal hernia   • History of exercise stress test 2016   • History of kidney stones     ALL PASSED WITHOUT INTERVENTION   • History of nuclear stress test 2016   • Leg cramps    • Neck pain    • Nephrolithiasis    • Prostate cancer (CMS/HCC)    • Skin lesions    • SOB (shortness of breath)     HISTORY OF. 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 positive    • Vitamin D deficiency        Past Surgical History  Past Surgical History:   Procedure Laterality Date   • APPENDECTOMY     • BACK SURGERY  2003    REMOVED BULGING DISC THAT BROKE OFF   • COLONOSCOPY      2017-[facility]   • CORONARY ARTERY BYPASS GRAFT  11/2014     MAKER. THEREAFTER, FOUND AN ARTERY WAS KNICKED AND PATIENT TAKEN BACK TO SURGERY TO REPAIR   • ENDOSCOPY     • ENDOSCOPY N/A 8/31/2018    Procedure: ESOPHAGOGASTRODUODENOSCOPY WITH BIOPSY;  Surgeon: Vicky Perez MD;  Location: [facility] ENDOSCOPY;  Service: Gastroenterology   • EYE SURGERY Right     CATARACT   • HERNIA REPAIR  1989    Hiatal hernia   • PROSTATE SURGERY      Prostate cancer s/p removal do PSA   • PROSTATECTOMY     • SKIN BIOPSY      HAVE HAD OVER 200 SPOTS REMOVED OF PRE-MELANOMA   • SMALL INTESTINE SURGERY  1997    REMOVED 12 INCHES OF INTESTINE \"DUE TO NITROGEN BUBBLES FORMING IN INTESTINAL WALLS CAUSING BLISTERS\"       Medications    Current Outpatient Medications:   •  aspirin 81 MG tablet, Take 81 mg by mouth Daily., Disp: , Rfl:   •  atorvastatin (LIPITOR) 40 MG tablet, TAKE 1 TABLET BY MOUTH ONE TIME A DAY , Disp: 90 tablet, Rfl: 0  •  betamethasone dipropionate (DIPROLENE) 0.05 % cream, APPLY TO AFFECTED AREA ONCE DAILY, Disp: 45 g, Rfl: 2  •  carvedilol (COREG) 25 MG tablet, TAKE 1 TABLET BY MOUTH TWO TIMES A DAY , Disp: 180 tablet, Rfl: 0  •  Cholecalciferol (VITAMIN D3) 2000 UNITS tablet, Take  by mouth Daily., Disp: , Rfl:   •  gabapentin (NEURONTIN) 400 MG capsule, TAKE 1 CAPSULE BY MOUTH THREE TIMES A DAY , Disp: 270 capsule, Rfl: 1  •  glucose blood " (ONE TOUCH ULTRA TEST) test strip, Use as instructed THREE TIMES A DAY to test blood sugar, Disp: 150 each, Rfl: 4  •  Insulin Pen Needle (BD PEN NEEDLE EDDI U/F) 32G X 4 MM misc, PATIENT TESTING 3-4 TIMES A DAY., Disp: 150 each, Rfl: 11  •  Iron, Ferrous Gluconate, 256 (28 Fe) MG tablet, 1 daily po (Patient taking differently: Take  by mouth Daily. 1 daily po), Disp: 30 tablet, Rfl: 3  •  lansoprazole (PREVACID) 30 MG capsule, Take 1 capsule by mouth Daily., Disp: 90 capsule, Rfl: 3  •  losartan (COZAAR) 100 MG tablet, Take 1 tablet by mouth Daily. 200001, Disp: 90 tablet, Rfl: 3  •  nitroglycerin (NITROSTAT) 0.4 MG SL tablet, Place 1 tablet under the tongue Every 5 (Five) Minutes As Needed for Chest Pain. Take no more than 3 doses in 15 minutes., Disp: 50 tablet, Rfl: 12  •  NOVOLOG FLEXPEN 100 UNIT/ML solution pen-injector sc pen, inject 5 units under the skin into the apropriate area three times a day, with meals, as directed  (Patient taking differently: 6 Units 2 (Two) Times a Day. And 3rd one if needed), Disp: 15 mL, Rfl: 0  •  polyethylene glycol (MIRALAX) powder, Take 17 g by mouth Daily., Disp: 510 g, Rfl: 2  •  Probiotic Product (PROBIOTIC DAILY PO), Take 1 tablet by mouth Daily., Disp: , Rfl:   •  TOUJEO SOLOSTAR 300 UNIT/ML solution pen-injector injection, INJECT 25 UNITS UNDER THE SKIN TWO TIMES A DAY  (Patient taking differently: 15 Units 2 (Two) Times a Day.), Disp: 4.5 mL, Rfl: 0  •  traMADol (ULTRAM) 50 MG tablet, Take 1 tablet by mouth 2 (Two) Times a Day As Needed for Moderate Pain ., Disp: 40 tablet, Rfl: 0  •  traZODone (DESYREL) 50 MG tablet, Take 1 tablet by mouth every night at bedtime. (Patient taking differently: Take 50 mg by mouth every night at bedtime. Pt only takes 1/4 tablet), Disp: 30 tablet, Rfl: 3  •  triamcinolone (KENALOG) 0.1 % ointment, Apply  topically to the appropriate area as directed 2 (Two) Times a Day., Disp: 15 g, Rfl: 0    Allergies  No Known Allergies    Social  History  Social History     Socioeconomic History   • Marital status:      Spouse name: Not on file   • Number of children: Not on file   • Years of education: Not on file   • Highest education level: Not on file   Tobacco Use   • Smoking status: Never Smoker   • Smokeless tobacco: Never Used   Substance and Sexual Activity   • Alcohol use: No   • Drug use: No   • Sexual activity: Defer       Family History  He has no family history of prostate or kidney cancer  He has no family history of kidney stones    Review of Systems  Constitutional: No fevers or chills  Skin: Negative for rash  Endocrine: No heat/cold intolerance   Cardiovascular: Negative for chest pain or dyspnea on exertion  Respiratory: Negative for shortness of breath or wheezing  Gastrointestinal: No nausea or vomiting  Genitourinary: Negative for current gross hematuria.  Musculoskeletal: No flank pain  Neurological:  Negative for frequent headaches or dizziness  Lymph/Heme: Negative for leg swelling or calf pain.    Physical Exam  There were no vitals taken for this visit.  Constitutional: NAD, WDWN.   HEENT: NCAT. Conjunctivae normal.  MMM.    Cardiovascular: Regular rate.  Pulmonary/Chest: Respirations are even and non-labored bilaterally.  Abdominal: Soft. No distension, tenderness, masses or guarding. No CVA tenderness.  Neurological: A + O x 3.  Cranial Nerves II-XII grossly intact. Normal gait.  Extremities: JOVANI x 4, Warm. No clubbing.  No cyanosis.    Skin: Pink, warm and dry.  No rashes noted.  Psychiatric:  Normal mood and affect    Genitourinary  Circumcised penis, testicles descended bilaterally with small bilateral hydroceles.  No pain to palpation.    Labs  Lab Results   Component Value Date    PSA <0.1 01/18/2019    PSA <0.064 09/20/2017    PSA 0.00 05/13/2015       Brief Urine Lab Results  (Last result in the past 365 days)      Color   Clarity   Blood   Leuk Est   Nitrite   Protein   CREAT   Urine HCG        01/07/20 1128  Yellow Clear Negative Negative Negative Negative               PVR  Post-void residual performed by staff - 75    Preprocedure diagnosis  Incontinence    Postprocedure diagnosis  No BNC    Procedure  Flexible Cystourethroscopy    Attending surgeon  Chung Oliva MD    Anesthesia  2% lidocaine jelly intraurethrally    Complications  None    Indications  80 y.o. male undergoing a flexible cystoscopy for the above mentioned indications.  Informed consent was obtained.      Findings  Cystoscopic findings included one right and left ureteral orifice in the normal anatomic position with normal bladder mucosa and no tumors, masses or stones. The urethral urothelium was within normal limits with no strictures.  There was an open prostatic fossa with no BNC.     Procedure  The patient was placed in supine position and prepped and draped in sterile fashion with lidocaine jelly per urethra for anesthesia.  A timeout was performed.  The 14F flexible cystoscope was lubricated and gently placed through the penile urethra and into the bladder.  The bladder was completely visualized.  The cystoscope was retroflexed and the bladder neck and prostate visualized.  The cystoscope was slowly withdrawn while visualizing the urethra and the procedure terminated. The patient tolerated the procedure well.      Uroflow:  Peak flow rate -12.9 mL/sec  Average flow rate -6.4 mL/sec  Flow curve -fairly bell-shaped  Voided volume -192 mL    I personally reviewed  and interpreted this study.       Assessment  Mr. Haines is a 80 y.o. male with history of prostatectomy who presents with LUTS, possibly UUI vs nighttime ALLAN due to relaxed sphincter.  There was no urethral stricture or bladder neck contracture on cystoscopy today.  He is a longstanding history of post prostatectomy erectile dysfunction as well.  He has tried ICI in the past.    Plan  1.  Fu for UDS  2.  PFPT  3.  Myrbetriq  4.  I offered him ICI or IPP; but he  declined      Chung Oliva MD

## 2020-01-31 RX ORDER — LANSOPRAZOLE 30 MG/1
CAPSULE, DELAYED RELEASE ORAL
Qty: 90 CAPSULE | Refills: 0 | Status: SHIPPED | OUTPATIENT
Start: 2020-01-31 | End: 2020-04-27

## 2020-02-17 ENCOUNTER — ANESTHESIA (OUTPATIENT)
Dept: GASTROENTEROLOGY | Facility: HOSPITAL | Age: 80
End: 2020-02-17

## 2020-02-17 ENCOUNTER — HOSPITAL ENCOUNTER (OUTPATIENT)
Facility: HOSPITAL | Age: 80
Setting detail: HOSPITAL OUTPATIENT SURGERY
Discharge: HOME | End: 2020-02-17
Attending: INTERNAL MEDICINE | Admitting: INTERNAL MEDICINE

## 2020-02-17 ENCOUNTER — ANESTHESIA EVENT (OUTPATIENT)
Dept: GASTROENTEROLOGY | Facility: HOSPITAL | Age: 80
End: 2020-02-17

## 2020-02-17 VITALS
DIASTOLIC BLOOD PRESSURE: 80 MMHG | OXYGEN SATURATION: 96 % | HEIGHT: 73 IN | BODY MASS INDEX: 25.84 KG/M2 | RESPIRATION RATE: 18 BRPM | WEIGHT: 195 LBS | SYSTOLIC BLOOD PRESSURE: 161 MMHG | HEART RATE: 80 BPM | TEMPERATURE: 97.7 F

## 2020-02-17 DIAGNOSIS — K92.1 HEMATOCHEZIA: ICD-10-CM

## 2020-02-17 DIAGNOSIS — R19.4 CHANGE IN BOWEL HABITS: ICD-10-CM

## 2020-02-17 LAB — GLUCOSE BLDC GLUCOMTR-MCNC: 86 MG/DL (ref 70–130)

## 2020-02-17 PROCEDURE — 82962 GLUCOSE BLOOD TEST: CPT

## 2020-02-17 PROCEDURE — 25010000002 PROPOFOL 10 MG/ML EMULSION: Performed by: NURSE ANESTHETIST, CERTIFIED REGISTERED

## 2020-02-17 RX ORDER — LOSARTAN POTASSIUM 100 MG/1
TABLET ORAL
Qty: 90 TABLET | Refills: 0 | Status: SHIPPED | OUTPATIENT
Start: 2020-02-17 | End: 2020-05-21

## 2020-02-17 RX ORDER — PROPOFOL 10 MG/ML
VIAL (ML) INTRAVENOUS AS NEEDED
Status: DISCONTINUED | OUTPATIENT
Start: 2020-02-17 | End: 2020-02-17 | Stop reason: SURG

## 2020-02-17 RX ORDER — INSULIN GLARGINE 300 U/ML
INJECTION, SOLUTION SUBCUTANEOUS
Qty: 4.5 ML | Refills: 0 | Status: SHIPPED | OUTPATIENT
Start: 2020-02-17 | End: 2020-03-16

## 2020-02-17 RX ORDER — ATORVASTATIN CALCIUM 40 MG/1
TABLET, FILM COATED ORAL
Qty: 90 TABLET | Refills: 0 | Status: SHIPPED | OUTPATIENT
Start: 2020-02-17 | End: 2020-05-21

## 2020-02-17 RX ORDER — BETAMETHASONE DIPROPIONATE 0.5 MG/G
CREAM TOPICAL
Qty: 45 G | Refills: 0 | Status: SHIPPED | OUTPATIENT
Start: 2020-02-17 | End: 2020-05-21

## 2020-02-17 RX ORDER — SODIUM CHLORIDE 0.9 % (FLUSH) 0.9 %
10 SYRINGE (ML) INJECTION AS NEEDED
Status: DISCONTINUED | OUTPATIENT
Start: 2020-02-17 | End: 2020-02-17 | Stop reason: HOSPADM

## 2020-02-17 RX ORDER — CARVEDILOL 25 MG/1
TABLET ORAL
Qty: 180 TABLET | Refills: 0 | Status: SHIPPED | OUTPATIENT
Start: 2020-02-17 | End: 2020-05-21

## 2020-02-17 RX ORDER — SODIUM CHLORIDE 9 MG/ML
70 INJECTION, SOLUTION INTRAVENOUS CONTINUOUS PRN
Status: DISCONTINUED | OUTPATIENT
Start: 2020-02-17 | End: 2020-02-17 | Stop reason: HOSPADM

## 2020-02-17 RX ADMIN — PROPOFOL 50 MG: 10 INJECTION, EMULSION INTRAVENOUS at 11:14

## 2020-02-17 RX ADMIN — SODIUM CHLORIDE 70 ML/HR: 9 INJECTION, SOLUTION INTRAVENOUS at 10:51

## 2020-02-17 RX ADMIN — LIDOCAINE HYDROCHLORIDE 60 MG: 20 INJECTION, SOLUTION INTRAVENOUS at 11:00

## 2020-02-17 RX ADMIN — PROPOFOL 80 MG: 10 INJECTION, EMULSION INTRAVENOUS at 11:00

## 2020-02-17 RX ADMIN — PROPOFOL 50 MG: 10 INJECTION, EMULSION INTRAVENOUS at 11:11

## 2020-02-17 RX ADMIN — PROPOFOL 50 MG: 10 INJECTION, EMULSION INTRAVENOUS at 11:07

## 2020-02-17 RX ADMIN — PROPOFOL 20 MG: 10 INJECTION, EMULSION INTRAVENOUS at 11:03

## 2020-02-17 NOTE — ANESTHESIA POSTPROCEDURE EVALUATION
Patient: Tristan Haines    Procedure Summary     Date:  02/17/20 Room / Location:  [facility] ENDOSCOPY 2 / [facility] ENDOSCOPY    Anesthesia Start:  1059 Anesthesia Stop:  1125    Procedure:  COLONOSCOPY WITH COLD FORCEP POLYPECTOMY, COLD FORCEP BIOPSIES (N/A Anus) Diagnosis:       Change in bowel habits      Hematochezia      (Change in bowel habits [R19.4])      (Hematochezia [K92.1])    Surgeon:  Maksim Peguero MD Provider:  Buddy Hernandez CRNA    Anesthesia Type:  MAC ASA Status:  3          Anesthesia Type: MAC    Vitals  Vitals Value Taken Time   /80 2/17/2020 12:08 PM   Temp 97.7 °F (36.5 °C) 2/17/2020 11:38 AM   Pulse 80 2/17/2020 12:08 PM   Resp 18 2/17/2020 12:08 PM   SpO2 96 % 2/17/2020 12:08 PM           Post Anesthesia Care and Evaluation    Patient location during evaluation: bedside  Patient participation: complete - patient participated  Level of consciousness: awake and sleepy but conscious  Pain management: adequate  Airway patency: patent  Anesthetic complications: No anesthetic complications  PONV Status: none  Cardiovascular status: acceptable and hemodynamically stable  Respiratory status: acceptable, room air, nonlabored ventilation and spontaneous ventilation  Hydration status: acceptable

## 2020-02-17 NOTE — CONSULTS
"     Follow Up Note     Date: 2020   Patient Name: Tristan Haines  MRN: [id number]  : 1940     Referring Physician: Maksim Peguero, *    Chief Complaint: He is here for colonoscopy for evaluation of hematochezia and change in bowel habit    Interval History:   2020  Tristan Haines is a 80 y.o. male who is here today for colonoscopy for evaluation of change in bowel habit and hematochezia.  No new complaints since seen month ago      Subjective      Past Medical History: @[facility]  Past Surgical History:   Past Surgical History:   Procedure Laterality Date   • ABLATION COLPOCLESIS      Spinal   • APPENDECTOMY     • BACK SURGERY      REMOVED BULGING DISC THAT BROKE OFF   • CARDIAC SURGERY      x's 2   • COLONOSCOPY      -[facility]   • CORONARY ARTERY BYPASS GRAFT  2014     MAKER. THEREAFTER, FOUND AN ARTERY WAS KNICKED AND PATIENT TAKEN BACK TO SURGERY TO REPAIR   • ENDOSCOPY     • ENDOSCOPY N/A 2018    Procedure: ESOPHAGOGASTRODUODENOSCOPY WITH BIOPSY;  Surgeon: Vicky Perez MD;  Location: [facility] ENDOSCOPY;  Service: Gastroenterology   • EYE SURGERY Right     CATARACT   • HERNIA REPAIR      Hiatal hernia   • HERNIA REPAIR      Hiatial   • PROSTATE SURGERY      Prostate cancer s/p removal do PSA   • PROSTATECTOMY     • SKIN BIOPSY      HAVE HAD OVER 200 SPOTS REMOVED OF PRE-MELANOMA   • SMALL INTESTINE SURGERY      REMOVED 12 INCHES OF INTESTINE \"DUE TO NITROGEN BUBBLES FORMING IN INTESTINAL WALLS CAUSING BLISTERS\"       Family History:   Family History   Problem Relation Age of Onset   • Cancer Mother    • Diabetes Father    • Hypertension Father    • Liver disease Father    • Cancer Sister    • Diabetes Brother    • Heart attack Other         grandparent   • Arthritis Other    • Heart attack Other        Social History:   Social History     Socioeconomic History   • Marital status:      Spouse name: Not on file   • Number of children: Not on file   • Years of " "education: Not on file   • Highest education level: Not on file   Tobacco Use   • Smoking status: Never Smoker   • Smokeless tobacco: Never Used   Substance and Sexual Activity   • Alcohol use: No   • Drug use: No   • Sexual activity: Defer       Medications:     Current Facility-Administered Medications:   •  sodium chloride 0.9 % flush 10 mL, 10 mL, Intravenous, PRN, Maksim Peguero MD    Allergies:   No Known Allergies    Review of Systems:   Review of Systems   Constitutional: Negative for appetite change, fatigue and unexpected weight loss.   Gastrointestinal: Negative for abdominal distention, abdominal pain, anal bleeding, blood in stool, constipation, diarrhea, nausea, rectal pain, vomiting, GERD and indigestion.       The following portions of the patient's history were reviewed and updated as appropriate: allergies, current medications, past family history, past medical history, past social history, past surgical history and problem list.    Objective     Physical Exam:  Vital Signs:   Vitals:    02/04/20 1446 02/17/20 1037   BP:  (!) 189/102   BP Location:  Right arm   Pulse:  71   Resp:  18   Temp:  97.7 °F (36.5 °C)   TempSrc:  Temporal   SpO2:  98%   Weight: 88.5 kg (195 lb)    Height: 185.4 cm (73\")        Physical Exam   Constitutional: He is oriented to person, place, and time. Vital signs are normal. He appears well-developed and well-nourished.   HENT:   Head: Normocephalic and atraumatic.   Right Ear: External ear normal.   Left Ear: External ear normal.   Nose: Nose normal.   Mouth/Throat: Oropharynx is clear and moist.   Eyes: Conjunctivae are normal.   Neck: Normal range of motion. Neck supple.   Cardiovascular: Normal rate, regular rhythm and normal heart sounds.   Pulmonary/Chest: Effort normal and breath sounds normal.   Abdominal: Soft. Bowel sounds are normal. He exhibits no distension, no ascites and no mass. There is no tenderness.   Musculoskeletal: Normal range of motion. "   Neurological: He is alert and oriented to person, place, and time.   Skin: Skin is warm and dry.   Psychiatric: He has a normal mood and affect. His behavior is normal. Judgment normal.   Nursing note and vitals reviewed.      Results Review:   I reviewed the patient's new clinical results.    Assessment / Plan        Assessment & Plan:    1. Change in bowel habits  colonoscopy today    2. Hematochezia  Occasional in wipes  And stool, hemorrhoidal vs other GI pathlogy to rule out.   For colonoscopy today  - Case Request; Standing  - sodium chloride 0.9 % infusion  - Case Request     3. Internal hemorrhoids  No flare now. Use preparation H as needed    Follow Up:   No follow-ups on file.    Maksim Peguero MD  [facility] [place]  2/17/2020  10:44 AM     Please note that portions of this note may have been completed with a voice recognition program. Efforts were made to edit the dictations, but occasionally words are mistranscribed.

## 2020-02-17 NOTE — DISCHARGE INSTRUCTIONS
No pushing, pulling, tugging,  heavy lifting, or strenuous activity.  No major decision making, driving, or drinking alcoholic beverages for 24 hours. ( due to the medications you have  received)  Always use good hand hygiene/washing techniques.  NO driving while taking pain medications.    * if you have an incision:  Check your incision area every day for signs of infection.   Check for:  * more redness, swelling, or pain  *more fluid or blood  *warmth  *pus or bad smell    To assist you in voiding:  Drink plenty of fluids  Listen to running water while attempting to void.    If you are unable to urinate and you have an uncomfortable urge to void or it has been   6 hours since you were discharged, return to the Emergency Room

## 2020-02-17 NOTE — ANESTHESIA PREPROCEDURE EVALUATION
Anesthesia Evaluation     Patient summary reviewed and Nursing notes reviewed   no history of anesthetic complications:  NPO Solid Status: > 8 hours  NPO Liquid Status: > 8 hours           Airway   Mallampati: I  TM distance: >3 FB  Neck ROM: full  No difficulty expected  Dental - normal exam     Pulmonary    (+) COPD, shortness of breath,   (-) not a smoker  Cardiovascular - normal exam    ECG reviewed  Patient on routine beta blocker and Beta blocker given within 24 hours of surgery    (+) hypertension 2 medications or greater, CAD, CABG >6 Months, hyperlipidemia,     ROS comment: Normal exercise stress test Jan 2018    ECG Results and Interpretation Report:   ECG Results and Interpretation:   Indication: borderline   Rhythm and rate:. Ventricular rate is 80 beats per minute. TN Interval: 210 seconds.   QRS: QRS interval: 100 seconds   ST segment: QTc interval: 411      Signatures   Electronically signed by : Cayden Chiang M.D.; Feb 4 2016  6:35PM EST    Neuro/Psych  (+) numbness,     GI/Hepatic/Renal/Endo    (+)  GERD,  liver disease (Fatty liver), renal disease stones, diabetes mellitus type 2 using insulin,     Musculoskeletal     (+) back pain, chronic pain, neck pain, radiculopathy  Abdominal    Substance History - negative use     OB/GYN negative ob/gyn ROS         Other   arthritis,    history of cancer    ROS/Med Hx Other: Arthritis  Colon polyp  Diabetes mellitus (CMS/HCC)  Edema of lower extremity  Gastroesophageal reflux disease  Hyperlipidemia  Hypertension  Insomnia  Lumbar radiculopathy  Neck pain  Calculus of kidney  Peripheral neuropathy  Malignant neoplasm of prostate (CMS/HCC)  Inflammation of sacroiliac joint (CMS/HCC)  Vitamin D deficiency  Hearing problem of both ears  CAD (coronary artery disease)  Inguinal hernia  Chronic hydrocele  Scoliosis of thoracolumbar spine (S/P Surgery w/ hardware, S/P Epidural pain pump)  Chronic low back pain  Fatty liver  Allergic drug reaction  Ileitis,  terminal, without complications (CMS/HCC)  Memory loss  Skin cancer  Lip lesion  Narrowing of stools  Urinary incontinence  Pain of both hip joints  Change in bowel habits  Hematochezia                        Anesthesia Plan    ASA 3     MAC   (Patient advised that intravenous sedation would be utilized as primary anesthetic technique. Every effort will be made to make sure patient is comfortable. Patient advised that they may experience recall of events of the procedure. Patient verbalized understanding and agreed to plan. )  intravenous induction     Anesthetic plan, all risks, benefits, and alternatives have been provided, discussed and informed consent has been obtained with: patient.    Plan discussed with CRNA.

## 2020-02-20 LAB
LAB AP CASE REPORT: NORMAL
PATH REPORT.FINAL DX SPEC: NORMAL

## 2020-02-24 ENCOUNTER — OFFICE VISIT (OUTPATIENT)
Dept: GASTROENTEROLOGY | Facility: CLINIC | Age: 80
End: 2020-02-24

## 2020-02-24 ENCOUNTER — LAB (OUTPATIENT)
Dept: LAB | Facility: HOSPITAL | Age: 80
End: 2020-02-24

## 2020-02-24 ENCOUNTER — TRANSCRIBE ORDERS (OUTPATIENT)
Dept: MRI IMAGING | Facility: HOSPITAL | Age: 80
End: 2020-02-24

## 2020-02-24 VITALS
OXYGEN SATURATION: 95 % | WEIGHT: 204 LBS | BODY MASS INDEX: 27.04 KG/M2 | RESPIRATION RATE: 18 BRPM | HEART RATE: 60 BPM | SYSTOLIC BLOOD PRESSURE: 140 MMHG | TEMPERATURE: 97.8 F | DIASTOLIC BLOOD PRESSURE: 82 MMHG | HEIGHT: 73 IN

## 2020-02-24 DIAGNOSIS — K50.00 ILEITIS, TERMINAL, WITHOUT COMPLICATIONS (HCC): Primary | ICD-10-CM

## 2020-02-24 DIAGNOSIS — K50.00 ILEITIS, TERMINAL, WITHOUT COMPLICATIONS (HCC): ICD-10-CM

## 2020-02-24 DIAGNOSIS — M47.814 SPONDYLOSIS OF THORACIC REGION WITHOUT MYELOPATHY OR RADICULOPATHY: Primary | ICD-10-CM

## 2020-02-24 DIAGNOSIS — K57.30 DIVERTICULOSIS OF COLON: ICD-10-CM

## 2020-02-24 DIAGNOSIS — Z86.010 PERSONAL HISTORY OF COLONIC POLYPS: ICD-10-CM

## 2020-02-24 PROCEDURE — 99213 OFFICE O/P EST LOW 20 MIN: CPT | Performed by: INTERNAL MEDICINE

## 2020-02-24 PROCEDURE — 36415 COLL VENOUS BLD VENIPUNCTURE: CPT

## 2020-02-24 PROCEDURE — 86480 TB TEST CELL IMMUN MEASURE: CPT

## 2020-02-24 NOTE — PROGRESS NOTES
Follow Up Note     Date: 2020   Patient Name: Tristan Haines  MRN: [id number]  : 1940     Referring Physician: No ref. provider found    Chief Complaint:    Chief Complaint   Patient presents with   • Follow-up   • Heartburn       Interval History:   2020  Tristan Haines is a 80 y.o. male who is here today for follow up after colonoscopy and biopsy.  Complains of slight constipation since he had a colonoscopy.  He did not fill his MiraLAX.   No abdominal pain no nausea vomiting.  He is here to discuss the pathology report following his colonoscopy    2/15/2020  Tristan Haines is a 80 y.o. male who is here today to establish care with Gastroenterology for evaluation of change in bowel habit since a year. Complains of pellet like stool firm, small bowl movement. Has to strain a lot but BM daily. BM once in 1-2 days. Takes OTC stool softener.  There is no associated rectal pain, hematochezia or melena. No associated abdominal pain or distension. 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   The patient has a history of constipation off and on for the the last many years. There is an associated hematochezia. Blood in wipes mainly.  No associated abdominal pain or distension.   Pt denies nause vomiting or odynophagia or dysphagia. Has history of acid reflux on lansoprazole. There is no history of liver or pancreatic disease. He had low HGB before but last HGB was 14g dl.  There is no history of anemia. No prior recent history of EGD.   Last colonoscopy was 2017 and had terminal ileal ulcer. Path most likely NSAID induced ulcer. Has prio h/o colon polyp, No polyps in last colonoscopy. No family history of colon cancer or any GI malignancy.       Subjective      Past Medical History: @[facility]  Past Surgical History:   Past Surgical History:   Procedure Laterality Date   • ABLATION COLPOCLESIS      Spinal   • APPENDECTOMY     • BACK SURGERY      REMOVED BULGING DISC THAT  "BROKE OFF   • CARDIAC SURGERY      x's 2   • COLONOSCOPY      2017-Chris   • COLONOSCOPY N/A 2/17/2020    Procedure: COLONOSCOPY WITH COLD FORCEP POLYPECTOMY, COLD FORCEP BIOPSIES;  Surgeon: Maksim Peguero MD;  Location: [facility] ENDOSCOPY;  Service: Gastroenterology;  Laterality: N/A;   • CORONARY ARTERY BYPASS GRAFT  11/2014     MAKER. THEREAFTER, FOUND AN ARTERY WAS KNICKED AND PATIENT TAKEN BACK TO SURGERY TO REPAIR   • ENDOSCOPY     • ENDOSCOPY N/A 8/31/2018    Procedure: ESOPHAGOGASTRODUODENOSCOPY WITH BIOPSY;  Surgeon: Vicky Perez MD;  Location: [facility] ENDOSCOPY;  Service: Gastroenterology   • EYE SURGERY Right     CATARACT   • HERNIA REPAIR  1989    Hiatal hernia   • HERNIA REPAIR  1989    Hiatial   • PROSTATE SURGERY      Prostate cancer s/p removal do PSA   • PROSTATECTOMY     • SKIN BIOPSY      HAVE HAD OVER 200 SPOTS REMOVED OF PRE-MELANOMA   • SMALL INTESTINE SURGERY  1997    REMOVED 12 INCHES OF INTESTINE \"DUE TO NITROGEN BUBBLES FORMING IN INTESTINAL WALLS CAUSING BLISTERS\"       Family History:   Family History   Problem Relation Age of Onset   • Cancer Mother    • Diabetes Father    • Hypertension Father    • Liver disease Father    • Cancer Sister    • Diabetes Brother    • Heart attack Other         grandparent   • Arthritis Other    • Heart attack Other        Social History:   Social History     Socioeconomic History   • Marital status:      Spouse name: Not on file   • Number of children: Not on file   • Years of education: Not on file   • Highest education level: Not on file   Tobacco Use   • Smoking status: Never Smoker   • Smokeless tobacco: Never Used   Substance and Sexual Activity   • Alcohol use: No   • Drug use: No   • Sexual activity: Defer       Medications:     Current Outpatient Medications:   •  aspirin 81 MG tablet, Take 81 mg by mouth Daily., Disp: , Rfl:   •  atorvastatin (LIPITOR) 40 MG tablet, TAKE 1 TABLET BY MOUTH ONE TIME A DAY , Disp: 90 tablet, Rfl: " 0  •  betamethasone dipropionate (DIPROLENE) 0.05 % cream, APPLY TO AFFECTED AREA ONCE DAILY, Disp: 45 g, Rfl: 0  •  carvedilol (COREG) 25 MG tablet, TAKE 1 TABLET BY MOUTH TWO TIMES A DAY , Disp: 180 tablet, Rfl: 0  •  Cholecalciferol (VITAMIN D3) 2000 UNITS tablet, Take 1 tablet by mouth Daily., Disp: , Rfl:   •  gabapentin (NEURONTIN) 400 MG capsule, TAKE 1 CAPSULE BY MOUTH THREE TIMES A DAY  (Patient taking differently: Take 400 mg by mouth 3 (Three) Times a Day.), Disp: 270 capsule, Rfl: 1  •  glucose blood (ONE TOUCH ULTRA TEST) test strip, use to test blood glucose three times daily, Disp: 150 each, Rfl: 0  •  Insulin Pen Needle (BD PEN NEEDLE EDDI U/F) 32G X 4 MM misc, PATIENT TESTING 3-4 TIMES A DAY., Disp: 150 each, Rfl: 11  •  Iron, Ferrous Gluconate, 256 (28 Fe) MG tablet, 1 daily po (Patient taking differently: Take 1 tablet by mouth Daily. 1 daily po), Disp: 30 tablet, Rfl: 3  •  lansoprazole (PREVACID) 30 MG capsule, TAKE 1 CAPSULE BY MOUTH ONE TIME A DAY  (Patient taking differently: Take 30 mg by mouth Daily.), Disp: 90 capsule, Rfl: 0  •  losartan (COZAAR) 100 MG tablet, TAKE 1 TABLET BY MOUTH ONE TIME A DAY , Disp: 90 tablet, Rfl: 0  •  nitroglycerin (NITROSTAT) 0.4 MG SL tablet, Place 1 tablet under the tongue Every 5 (Five) Minutes As Needed for Chest Pain. Take no more than 3 doses in 15 minutes., Disp: 50 tablet, Rfl: 12  •  NOVOLOG FLEXPEN 100 UNIT/ML solution pen-injector sc pen, inject 5 units under the skin into the apropriate area three times a day, with meals, as directed  (Patient taking differently: 6 Units 2 (Two) Times a Day. And 3rd one if needed), Disp: 15 mL, Rfl: 0  •  polyethylene glycol (MIRALAX) powder, Take 17 g by mouth Daily. (Patient taking differently: Take 17 g by mouth 1 (One) Time.), Disp: 510 g, Rfl: 2  •  Probiotic Product (PROBIOTIC DAILY PO), Take 1 tablet by mouth Daily., Disp: , Rfl:   •  TOUJEO SOLOSTAR 300 UNIT/ML solution pen-injector injection, INJECT 25  "UNITS UNDER THE SKIN TWO TIMES A DAY , Disp: 4.5 mL, Rfl: 0  •  traMADol (ULTRAM) 50 MG tablet, Take 1 tablet by mouth 2 (Two) Times a Day As Needed for Moderate Pain ., Disp: 40 tablet, Rfl: 0  •  traZODone (DESYREL) 50 MG tablet, Take 1 tablet by mouth every night at bedtime. (Patient taking differently: Take 12.5 mg by mouth every night at bedtime. Pt only takes 1/4 tablet), Disp: 30 tablet, Rfl: 3  •  triamcinolone (KENALOG) 0.1 % ointment, Apply  topically to the appropriate area as directed 2 (Two) Times a Day., Disp: 15 g, Rfl: 0    Allergies:   No Known Allergies    Review of Systems:   Review of Systems   Constitutional: Negative for appetite change, fatigue and unexpected weight loss.   Gastrointestinal: Positive for constipation, GERD and indigestion. Negative for abdominal distention, anal bleeding, blood in stool, diarrhea, nausea, rectal pain and vomiting.       The following portions of the patient's history were reviewed and updated as appropriate: allergies, current medications, past family history, past medical history, past social history, past surgical history and problem list.    Objective     Physical Exam:  Vital Signs:   Vitals:    02/24/20 1319   BP: 140/82   Pulse: 60   Resp: 18   Temp: 97.8 °F (36.6 °C)   TempSrc: Temporal   SpO2: 95%   Weight: 92.5 kg (204 lb)   Height: 185.4 cm (73\")       Physical Exam   Constitutional: He is oriented to person, place, and time. He appears well-developed and well-nourished.   HENT:   Head: Normocephalic and atraumatic.   Right Ear: External ear normal.   Left Ear: External ear normal.   Mouth/Throat: Oropharynx is clear and moist.   Eyes: Pupils are equal, round, and reactive to light. Conjunctivae and EOM are normal.   Neck: Normal range of motion. No tracheal deviation present. No thyromegaly present.   Cardiovascular: Normal rate and regular rhythm.   No murmur heard.  Pulmonary/Chest: Effort normal and breath sounds normal. No respiratory distress. "   Abdominal: Soft. Bowel sounds are normal. He exhibits no mass. No hernia.   Musculoskeletal: Normal range of motion. He exhibits no edema.   Neurological: He is alert and oriented to person, place, and time. No cranial nerve deficit or sensory deficit.   Skin: Skin is warm and dry.   Psychiatric: He has a normal mood and affect. His behavior is normal. Judgment and thought content normal.   Nursing note and vitals reviewed.      Results Review:   I reviewed the patient's new clinical results.    Assessment / Plan      Assessment & Plan:  1.  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  Biopsies consistent with acute mild inflammation, no architectural distortion.  He is not on any NSAIDs currently.   He had a prior ileal cecal anastomosis after small bowel resection many years ago.  His last colonoscopy done in 2017 also showed a similar ulcerations and biopsies showed a acute mild inflammation.  He is asymptomatic except mild constipation.  These findings but not consistent with Crohn's disease however given the chronicity and the mild stricture Crohn's disease is a concern.  Will get the TB Gold test, Giardia and amebiasis antigen and also get a CT enterography to assess for any evidence of proximal Crohn's disease.   We will simply monitor for now  We will hold the aspirin for now 2- 4 weeks to enhance the healing    2. Chronic idiopathic constipation  On OTC stool softener.  Did not start his MiraLAX yet  Miralax po daily.      3. Gastroesophageal reflux disease without esophagitis  On low dose PPI with good control on reflux, will continue the same     4.  Colonic diverticulosis  No signs of any diverticulitis    5.:  Colon Polyps  Small sigmoid polyp path hyperplastic  May not need any further surveillance colonoscopy    5.  Internal hemorrhoids  Asymptomatic now    Follow Up:   Return in about 8 weeks  (around 4/20/2020).    Maksim Peguero MD  [facility] [place]  2/24/2020  2:14 PM     Please note that portions of this note may have been completed with a voice recognition program. Efforts were made to edit the dictations, but occasionally words are mistranscribed.

## 2020-02-26 ENCOUNTER — OFFICE VISIT (OUTPATIENT)
Dept: INTERNAL MEDICINE | Facility: CLINIC | Age: 80
End: 2020-02-26

## 2020-02-26 VITALS
DIASTOLIC BLOOD PRESSURE: 84 MMHG | HEART RATE: 67 BPM | RESPIRATION RATE: 16 BRPM | WEIGHT: 203 LBS | TEMPERATURE: 98.1 F | BODY MASS INDEX: 26.9 KG/M2 | SYSTOLIC BLOOD PRESSURE: 126 MMHG | OXYGEN SATURATION: 97 % | HEIGHT: 73 IN

## 2020-02-26 DIAGNOSIS — T78.40XA ALLERGIC REACTION TO DRUG, INITIAL ENCOUNTER: ICD-10-CM

## 2020-02-26 DIAGNOSIS — R41.3 MEMORY LOSS: ICD-10-CM

## 2020-02-26 DIAGNOSIS — E78.5 HYPERLIPIDEMIA, UNSPECIFIED HYPERLIPIDEMIA TYPE: ICD-10-CM

## 2020-02-26 DIAGNOSIS — M54.16 LUMBAR RADICULOPATHY: ICD-10-CM

## 2020-02-26 DIAGNOSIS — M19.90 ARTHRITIS: Chronic | ICD-10-CM

## 2020-02-26 DIAGNOSIS — K40.90 INGUINAL HERNIA WITHOUT OBSTRUCTION OR GANGRENE, RECURRENCE NOT SPECIFIED, UNSPECIFIED LATERALITY: Chronic | ICD-10-CM

## 2020-02-26 DIAGNOSIS — E11.69 TYPE 2 DIABETES MELLITUS WITH OTHER SPECIFIED COMPLICATION, WITHOUT LONG-TERM CURRENT USE OF INSULIN (HCC): ICD-10-CM

## 2020-02-26 DIAGNOSIS — K63.5 POLYP OF COLON, UNSPECIFIED PART OF COLON, UNSPECIFIED TYPE: ICD-10-CM

## 2020-02-26 DIAGNOSIS — R31.1 BENIGN ESSENTIAL MICROSCOPIC HEMATURIA: ICD-10-CM

## 2020-02-26 DIAGNOSIS — K50.00 ILEITIS, TERMINAL, WITHOUT COMPLICATIONS (HCC): ICD-10-CM

## 2020-02-26 DIAGNOSIS — K13.0 LIP LESION: ICD-10-CM

## 2020-02-26 DIAGNOSIS — M25.552 PAIN OF BOTH HIP JOINTS: ICD-10-CM

## 2020-02-26 DIAGNOSIS — M25.551 PAIN OF BOTH HIP JOINTS: ICD-10-CM

## 2020-02-26 DIAGNOSIS — R79.9 ABNORMAL FINDING OF BLOOD CHEMISTRY, UNSPECIFIED: ICD-10-CM

## 2020-02-26 DIAGNOSIS — R32 URINARY INCONTINENCE, UNSPECIFIED TYPE: ICD-10-CM

## 2020-02-26 DIAGNOSIS — N43.3 CHRONIC HYDROCELE: Chronic | ICD-10-CM

## 2020-02-26 DIAGNOSIS — M41.9 SCOLIOSIS OF THORACOLUMBAR SPINE, UNSPECIFIED SCOLIOSIS TYPE: ICD-10-CM

## 2020-02-26 DIAGNOSIS — E55.9 VITAMIN D DEFICIENCY: ICD-10-CM

## 2020-02-26 DIAGNOSIS — K76.0 FATTY LIVER: ICD-10-CM

## 2020-02-26 DIAGNOSIS — M54.2 NECK PAIN: ICD-10-CM

## 2020-02-26 DIAGNOSIS — M54.50 CHRONIC LOW BACK PAIN, UNSPECIFIED BACK PAIN LATERALITY, UNSPECIFIED WHETHER SCIATICA PRESENT: ICD-10-CM

## 2020-02-26 DIAGNOSIS — I10 ESSENTIAL HYPERTENSION: ICD-10-CM

## 2020-02-26 DIAGNOSIS — G47.00 INSOMNIA, UNSPECIFIED TYPE: Chronic | ICD-10-CM

## 2020-02-26 DIAGNOSIS — H91.93 HEARING PROBLEM OF BOTH EARS: Chronic | ICD-10-CM

## 2020-02-26 DIAGNOSIS — C61 MALIGNANT NEOPLASM OF PROSTATE (HCC): ICD-10-CM

## 2020-02-26 DIAGNOSIS — G89.29 CHRONIC LOW BACK PAIN, UNSPECIFIED BACK PAIN LATERALITY, UNSPECIFIED WHETHER SCIATICA PRESENT: ICD-10-CM

## 2020-02-26 DIAGNOSIS — I25.119 CORONARY ARTERY DISEASE INVOLVING NATIVE CORONARY ARTERY OF NATIVE HEART WITH ANGINA PECTORIS (HCC): Primary | Chronic | ICD-10-CM

## 2020-02-26 DIAGNOSIS — G62.9 PERIPHERAL POLYNEUROPATHY: ICD-10-CM

## 2020-02-26 DIAGNOSIS — N20.0 CALCULUS OF KIDNEY: ICD-10-CM

## 2020-02-26 DIAGNOSIS — R60.0 EDEMA OF LOWER EXTREMITY: ICD-10-CM

## 2020-02-26 DIAGNOSIS — M46.1 INFLAMMATION OF SACROILIAC JOINT (HCC): ICD-10-CM

## 2020-02-26 DIAGNOSIS — K21.9 GASTROESOPHAGEAL REFLUX DISEASE WITHOUT ESOPHAGITIS: ICD-10-CM

## 2020-02-26 DIAGNOSIS — K92.1 HEMATOCHEZIA: ICD-10-CM

## 2020-02-26 PROBLEM — R19.5 NARROWING OF STOOLS: Status: RESOLVED | Noted: 2019-12-16 | Resolved: 2020-02-26

## 2020-02-26 PROBLEM — R19.4 CHANGE IN BOWEL HABITS: Status: RESOLVED | Noted: 2020-01-16 | Resolved: 2020-02-26

## 2020-02-26 PROCEDURE — 87337 ENTAMOEB HIST GROUP AG IA: CPT | Performed by: INTERNAL MEDICINE

## 2020-02-26 PROCEDURE — G0439 PPPS, SUBSEQ VISIT: HCPCS | Performed by: INTERNAL MEDICINE

## 2020-02-26 NOTE — PROGRESS NOTES
The ABCs of the Annual Wellness Visit  Subsequent Medicare Wellness Visit    Chief Complaint   Patient presents with   • Medicare Wellness-subsequent       Subjective   History of Present Illness:  Tristan Haines is a 80 y.o. male who presents for a Subsequent Medicare Wellness Visit.    HEALTH RISK ASSESSMENT    Recent Hospitalizations:  No hospitalization(s) within the last year.    Current Medical Providers:  Patient Care Team:  Cayden Chiang MD as PCP - General  Abdirizak Fink MD as PCP - [facility]  Vicky Perez MD as Consulting Physician (General Surgery)    Smoking Status:  Social History     Tobacco Use   Smoking Status Never Smoker   Smokeless Tobacco Never Used       Alcohol Consumption:  Social History     Substance and Sexual Activity   Alcohol Use No       Depression Screen:   PHQ-2/PHQ-9 Depression Screening 2/26/2020   Little interest or pleasure in doing things 0   Feeling down, depressed, or hopeless 0   Trouble falling or staying asleep, or sleeping too much -   Feeling tired or having little energy -   Poor appetite or overeating -   Feeling bad about yourself - or that you are a failure or have let yourself or your family down -   Trouble concentrating on things, such as reading the newspaper or watching television -   Moving or speaking so slowly that other people could have noticed. Or the opposite - being so fidgety or restless that you have been moving around a lot more than usual -   Thoughts that you would be better off dead, or of hurting yourself in some way -   Total Score 0   If you checked off any problems, how difficult have these problems made it for you to do your work, take care of things at home, or get along with other people? -       Fall Risk Screen:  STEADI Fall Risk Assessment has not been completed.    Health Habits and Functional and Cognitive Screening:  Functional & Cognitive Status 2/26/2020   Do you have difficulty preparing food and eating? No   Do you  have difficulty bathing yourself, getting dressed or grooming yourself? No   Do you have difficulty using the toilet? Yes   Do you have difficulty moving around from place to place? No   Do you have trouble with steps or getting out of a bed or a chair? No   Current Diet Well Balanced Diet   Dental Exam Up to date   Eye Exam Up to date   Exercise (times per week) 2 times per week   Current Exercise Activities Include Walking   Do you need help using the phone?  No   Are you deaf or do you have serious difficulty hearing?  No   Do you need help with transportation? No   Do you need help shopping? No   Do you need help preparing meals?  No   Do you need help with housework?  No   Do you need help with laundry? No   Do you need help taking your medications? No   Do you need help managing money? No   Do you ever drive or ride in a car without wearing a seat belt? No   Have you felt unusual stress, anger or loneliness in the last month? No   Who do you live with? Spouse   If you need help, do you have trouble finding someone available to you? No   Have you been bothered in the last four weeks by sexual problems? No   Do you have difficulty concentrating, remembering or making decisions? No         Does the patient have evidence of cognitive impairment? No    Asprin use counseling:Taking ASA appropriately as indicated    Age-appropriate Screening Schedule:  Refer to the list below for future screening recommendations based on patient's age, sex and/or medical conditions. Orders for these recommended tests are listed in the plan section. The patient has been provided with a written plan.    Health Maintenance   Topic Date Due   • TDAP/TD VACCINES (1 - Tdap) 01/11/1951   • ZOSTER VACCINE (2 of 2) 11/10/2015   • HEMOGLOBIN A1C  01/12/2020   • URINE MICROALBUMIN  01/18/2020   • LIPID PANEL  07/12/2020   • DIABETIC EYE EXAM  09/18/2020   • COLONOSCOPY  02/17/2030   • PNEUMOCOCCAL VACCINE (65+ HIGH RISK)  Completed   •  INFLUENZA VACCINE  Addressed          The following portions of the patient's history were reviewed and updated as appropriate: allergies, current medications, past family history, past medical history, past social history, past surgical history and problem list.    Outpatient Medications Prior to Visit   Medication Sig Dispense Refill   • aspirin 81 MG tablet Take 81 mg by mouth Daily.     • atorvastatin (LIPITOR) 40 MG tablet TAKE 1 TABLET BY MOUTH ONE TIME A DAY  90 tablet 0   • betamethasone dipropionate (DIPROLENE) 0.05 % cream APPLY TO AFFECTED AREA ONCE DAILY 45 g 0   • carvedilol (COREG) 25 MG tablet TAKE 1 TABLET BY MOUTH TWO TIMES A DAY  180 tablet 0   • Cholecalciferol (VITAMIN D3) 2000 UNITS tablet Take 1 tablet by mouth Daily.     • gabapentin (NEURONTIN) 400 MG capsule TAKE 1 CAPSULE BY MOUTH THREE TIMES A DAY  (Patient taking differently: Take 400 mg by mouth 3 (Three) Times a Day.) 270 capsule 1   • glucose blood (ONE TOUCH ULTRA TEST) test strip use to test blood glucose three times daily 150 each 0   • Insulin Pen Needle (BD PEN NEEDLE EDDI U/F) 32G X 4 MM misc PATIENT TESTING 3-4 TIMES A DAY. 150 each 11   • Iron, Ferrous Gluconate, 256 (28 Fe) MG tablet 1 daily po (Patient taking differently: Take 1 tablet by mouth Daily. 1 daily po) 30 tablet 3   • lansoprazole (PREVACID) 30 MG capsule TAKE 1 CAPSULE BY MOUTH ONE TIME A DAY  (Patient taking differently: Take 30 mg by mouth Daily.) 90 capsule 0   • losartan (COZAAR) 100 MG tablet TAKE 1 TABLET BY MOUTH ONE TIME A DAY  90 tablet 0   • nitroglycerin (NITROSTAT) 0.4 MG SL tablet Place 1 tablet under the tongue Every 5 (Five) Minutes As Needed for Chest Pain. Take no more than 3 doses in 15 minutes. 50 tablet 12   • NOVOLOG FLEXPEN 100 UNIT/ML solution pen-injector sc pen inject 5 units under the skin into the apropriate area three times a day, with meals, as directed  (Patient taking differently: 6 Units 2 (Two) Times a Day. And 3rd one if needed)  15 mL 0   • polyethylene glycol (MIRALAX) powder Take 17 g by mouth Daily. (Patient taking differently: Take 17 g by mouth 1 (One) Time.) 510 g 2   • Probiotic Product (PROBIOTIC DAILY PO) Take 1 tablet by mouth Daily.     • TOUJEO SOLOSTAR 300 UNIT/ML solution pen-injector injection INJECT 25 UNITS UNDER THE SKIN TWO TIMES A DAY  4.5 mL 0   • traMADol (ULTRAM) 50 MG tablet Take 1 tablet by mouth 2 (Two) Times a Day As Needed for Moderate Pain . 40 tablet 0   • traZODone (DESYREL) 50 MG tablet Take 1 tablet by mouth every night at bedtime. (Patient taking differently: Take 12.5 mg by mouth every night at bedtime. Pt only takes 1/4 tablet) 30 tablet 3   • triamcinolone (KENALOG) 0.1 % ointment Apply  topically to the appropriate area as directed 2 (Two) Times a Day. 15 g 0     No facility-administered medications prior to visit.        Patient Active Problem List   Diagnosis   • Arthritis   • Colon polyp   • Diabetes mellitus (CMS/HCC)   • Edema of lower extremity   • Gastroesophageal reflux disease   • Hyperlipidemia   • Hypertension   • Insomnia   • Lumbar radiculopathy   • Neck pain   • Calculus of kidney   • Peripheral neuropathy   • Malignant neoplasm of prostate (CMS/HCC)   • Inflammation of sacroiliac joint (CMS/HCC)   • Vitamin D deficiency   • Hearing problem of both ears   • CAD (coronary artery disease)   • Inguinal hernia   • Chronic hydrocele   • Scoliosis of thoracolumbar spine (S/P Surgery w/ hardware, S/P Epidural pain pump)   • Chronic low back pain   • Fatty liver   • Allergic drug reaction   • Ileitis, terminal, without complications (CMS/HCC)   • Memory loss   • Skin cancer   • Lip lesion   • Urinary incontinence   • Pain of both hip joints   • Hematochezia       Advanced Care Planning:  Patient has an advance directive, copy requested.    Review of Systems   Constitutional: Negative.    Respiratory: Negative.    Cardiovascular: Negative.    Gastrointestinal: Positive for constipation.   Skin:  "Negative.    Psychiatric/Behavioral: Negative.        Compared to one year ago, the patient feels his physical health is the same.  Compared to one year ago, the patient feels his mental health is the same.    Reviewed chart for potential of high risk medication in the elderly: yes  Reviewed chart for potential of harmful drug interactions in the elderly:yes    Objective         Vitals:    02/26/20 1349   BP: 126/84   Pulse: 67   Resp: 16   Temp: 98.1 °F (36.7 °C)   TempSrc: Temporal   SpO2: 97%   Weight: 92.1 kg (203 lb)   Height: 185.4 cm (72.99\")       Body mass index is 26.79 kg/m².  Discussed the patient's BMI with him. The BMI is in the acceptable range.    Physical Exam   Constitutional: He is oriented to person, place, and time. He appears well-developed and well-nourished.   Neck: Neck supple.   Cardiovascular: Normal rate, regular rhythm and normal heart sounds.   Pulmonary/Chest: Effort normal and breath sounds normal.   Abdominal: Soft. Bowel sounds are normal.   Neurological: He is alert and oriented to person, place, and time.   Psychiatric: He has a normal mood and affect. His behavior is normal.             Assessment/Plan   Medicare Risks and Personalized Health Plan  CMS Preventative Services Quick Reference  Advance Directive Discussion    The above risks/problems have been discussed with the patient.  Pertinent information has been shared with the patient in the After Visit Summary.  Follow up plans and orders are seen below in the Assessment/Plan Section.    Diagnoses and all orders for this visit:    1. Coronary artery disease involving native coronary artery of native heart with angina pectoris (CMS/[facility]) (Primary)  -     CBC & Differential  -     Comprehensive Metabolic Panel  -     Lipid Panel  -     TSH  -     PSA DIAGNOSTIC  -     CK  -     Hemoglobin A1c  -     Prolactin  -     Iron Profile    2. Hyperlipidemia, unspecified hyperlipidemia type  -     CBC & Differential  -     Comprehensive " Metabolic Panel  -     Lipid Panel  -     TSH  -     PSA DIAGNOSTIC  -     CK  -     Hemoglobin A1c  -     Prolactin  -     Iron Profile    3. Essential hypertension    4. Polyp of colon, unspecified part of colon, unspecified type    5. Gastroesophageal reflux disease without esophagitis    6. Vitamin D deficiency  -     Vitamin D 25 Hydroxy    7. Fatty liver    8. Ileitis, terminal, without complications (CMS/HCC) follow up with GI pending CT abdomen    9. Type 2 diabetes mellitus with other specified complication, without long-term current use of insulin (CMS/HCC)  -     MicroAlbumin, Urine, Random - Urine, Clean Catch    10. Hearing problem of both ears    11. Lumbar radiculopathy improved after alation follow up with pain clinic    12. Neck pain improved after nerve block    13. Peripheral polyneuropathy    14. Chronic low back pain, unspecified back pain laterality, unspecified whether sciatica present    15. Pain of both hip joints    16. Arthritis    17. Inflammation of sacroiliac joint (CMS/HCC)    18. Scoliosis of thoracolumbar spine, unspecified scoliosis type, follow up pain clinic pending MRI    19. Allergic reaction to drug, initial encounter    20. Chronic hydrocele    21. Calculus of kidney    22. Malignant neoplasm of prostate (CMS/HCC)    23. Urinary incontinence, improved after seeing uro and s/p scope    24. Inguinal hernia without obstruction or gangrene, recurrence not specified, unspecified laterality    25. Insomnia, unspecified type    26. Edema of lower extremity compression hose.    27. Memory loss    28. Lip lesion    29. Hematochezia    30. Benign essential microscopic hematuria   -     PSA DIAGNOSTIC    31. Abnormal finding of blood chemistry, unspecified   -     Hemoglobin A1c  -     Iron Profile  Solar lentigo follow up with dermatologist    Follow Up:  Return in about 6 months (around 8/26/2020) for Recheck.     An After Visit Summary and PPPS were given to the patient.       Below  is to historical records for reference only:   Edema of lower extremities arcelia compression hose, 1+ now  Skin cancer follow-up with dermatologist  Memory loss Mini-Mental status examination showed a score of 29.  Continue to watch  Right 4th finger distal lig tear seen by hand surgeon no surgical indication.   Type 2 diabetes mellitus continue med decrease tojeo to 15u from 20 novolog 5 u ac    Essential hypertension continue med   Radiculopathy, cervical  S/p shots and doing better,   Constipation , fiber probiotic, no diarrhea  lumbar disc surgery, 2004 L4-5.  LBP lumbar radiculopathy. continue Flexeril Mobic, MRI L2/3 mod to severe stenosis. continue follow-up with chiropractor, s/p ablation, follow up with pain clinic  tramadol  neuropathy,neurotin 400mg bid continue unable to tolerate lyrica  sacroiliac inflammation continue chiro. tylenol arthritis.  edema resolved after d/c amlodipine  Coronary artery disease a status post a CABG  continue cardio follow up, aldactone caused high potassium.  Anemia,Due to gastritis per dr solorio continue PPI .  Bright red blood per rectum Hemoccult positive m. w/u lab done.  low ferritin sbft if still anemia. s/p colonoscopy2/17/20,:ileitis and s/p upper endoscopy:esophagitis  eczema refill medicine  diarrhea, metformin caused, resolved after d/c metformin  GERD hx of HH surgery continue medicine  s/p EGD  Hyperlipidemia continue atorvastatin 40 mg   Peripheral neuropathy continue medications  tinnitus, loss of hearing  vision change see eye doctor  leg cramps skin lesions,   hydrocele seen by uro  sleep disturbances, continue trazodone  Td to 2010?, prevnar 13 done, zostavax done, refuse flushot. pneumovax done, shingrix informed  prostate ca s/p removal PSA=0.00, now urine hesitation seen by uro.   Urine incont. Cont merbetrq and follow uro  Sleep apnea, sleep study negative for sleep apnea  Constipation, fiber and water, metamucil improved      CT:gall stone, hydrocele and  divertic       CKD watch       Td to HD, patient refuses flu      Gynecomastia and mamm and us done , prolactin slight high, other hormones normal. Watch for now--

## 2020-02-27 LAB
QUANTIFERON CRITERIA: NORMAL
QUANTIFERON MITOGEN VALUE: >10 IU/ML
QUANTIFERON NIL VALUE: 0.05 IU/ML
QUANTIFERON TB1 AG VALUE: 0.31 IU/ML
QUANTIFERON TB2 AG VALUE: 0.28 IU/ML
QUANTIFERON-TB GOLD PLUS: NEGATIVE

## 2020-02-28 ENCOUNTER — HOSPITAL ENCOUNTER (OUTPATIENT)
Dept: CT IMAGING | Facility: HOSPITAL | Age: 80
Discharge: HOME | End: 2020-02-28
Admitting: INTERNAL MEDICINE

## 2020-02-28 ENCOUNTER — HOSPITAL ENCOUNTER (OUTPATIENT)
Dept: MRI IMAGING | Facility: HOSPITAL | Age: 80
Discharge: HOME | End: 2020-02-28

## 2020-02-28 DIAGNOSIS — K50.00 ILEITIS, TERMINAL, WITHOUT COMPLICATIONS (HCC): ICD-10-CM

## 2020-02-28 DIAGNOSIS — M47.814 SPONDYLOSIS OF THORACIC REGION WITHOUT MYELOPATHY OR RADICULOPATHY: ICD-10-CM

## 2020-02-28 LAB — CREAT BLDA-MCNC: 1.5 MG/DL (ref 0.6–1.3)

## 2020-02-28 PROCEDURE — A9270 NON-COVERED ITEM OR SERVICE: HCPCS | Performed by: INTERNAL MEDICINE

## 2020-02-28 PROCEDURE — 25010000002 IOPAMIDOL 61 % SOLUTION: Performed by: INTERNAL MEDICINE

## 2020-02-28 PROCEDURE — 63710000001 BARIUM 0.1 % SUSPENSION: Performed by: INTERNAL MEDICINE

## 2020-02-28 PROCEDURE — 72146 MRI CHEST SPINE W/O DYE: CPT

## 2020-02-28 PROCEDURE — 74160 CT ABDOMEN W/CONTRAST: CPT

## 2020-02-28 PROCEDURE — 82565 ASSAY OF CREATININE: CPT

## 2020-02-28 RX ADMIN — BARIUM SULFATE 1800 ML: 1 SUSPENSION ORAL at 15:21

## 2020-02-28 RX ADMIN — IOPAMIDOL 100 ML: 612 INJECTION, SOLUTION INTRAVENOUS at 15:21

## 2020-02-29 LAB — E HISTOLYT DNA STL QL NAA+NON-PROBE: NEGATIVE

## 2020-03-16 RX ORDER — INSULIN ASPART 100 [IU]/ML
INJECTION, SOLUTION INTRAVENOUS; SUBCUTANEOUS
Qty: 15 ML | Refills: 0 | Status: SHIPPED | OUTPATIENT
Start: 2020-03-16 | End: 2020-10-05 | Stop reason: SDUPTHER

## 2020-03-16 RX ORDER — INSULIN GLARGINE 300 U/ML
INJECTION, SOLUTION SUBCUTANEOUS
Qty: 4.5 ML | Refills: 0 | Status: SHIPPED | OUTPATIENT
Start: 2020-03-16 | End: 2020-05-21

## 2020-03-23 RX ORDER — TRAZODONE HYDROCHLORIDE 50 MG/1
TABLET ORAL
Qty: 30 TABLET | Refills: 3 | Status: SHIPPED | OUTPATIENT
Start: 2020-03-23 | End: 2020-12-07 | Stop reason: SDUPTHER

## 2020-04-20 ENCOUNTER — OFFICE VISIT (OUTPATIENT)
Dept: GASTROENTEROLOGY | Facility: CLINIC | Age: 80
End: 2020-04-20

## 2020-04-20 DIAGNOSIS — K21.9 GASTROESOPHAGEAL REFLUX DISEASE WITHOUT ESOPHAGITIS: ICD-10-CM

## 2020-04-20 DIAGNOSIS — K50.00 ILEITIS, TERMINAL, WITHOUT COMPLICATIONS (HCC): Primary | ICD-10-CM

## 2020-04-20 PROCEDURE — 99442 PR PHYS/QHP TELEPHONE EVALUATION 11-20 MIN: CPT | Performed by: INTERNAL MEDICINE

## 2020-04-20 RX ORDER — BUDESONIDE 3 MG/1
9 CAPSULE, COATED PELLETS ORAL DAILY
Qty: 168 CAPSULE | Refills: 0 | Status: SHIPPED | OUTPATIENT
Start: 2020-04-20 | End: 2020-06-15

## 2020-04-20 NOTE — PROGRESS NOTES
You have chosen to receive care through a telephone visit. Do you consent to use a telephone visit for your medical care today? Yes           Follow Up Note     Date: 2020   Patient Name: Tristan Haines  MRN: [id number]  : 1940     Referring Physician: Cayden Chiang MD    Chief Complaint:.  For follow-up of small bowel ulcers and to discuss the CT scan report and lab work    Interval History:   2020  Tristan Haines is a 80 y.o. male who is here today for follow up for his small bowel ulcers noted in the colonoscopy.  This visit is to discuss his recent CT enterography report and the blood work.  He states that his bowel movements improved now after he had a bowel prep for colonoscopy.  He is not using his MiraLAX is having a regular bowel movement once daily normal.  Is any abdominal pain no nausea vomiting.    2020  Tristan Haines is a 80 y.o. male who is here today for follow up after colonoscopy and biopsy.  Complains of slight constipation since he had a colonoscopy.  He did not fill his MiraLAX.   No abdominal pain no nausea vomiting.  He is here to discuss the pathology report following his colonoscopy     2/15/2020  Tristan Haines is a 80 y.o. male who is here today to establish care with Gastroenterology for evaluation of change in bowel habit since a year. Complains of pellet like stool firm, small bowl movement. Has to strain a lot but BM daily. BM once in 1-2 days. Takes OTC stool softener.  There is no associated rectal pain, hematochezia or melena. No associated abdominal pain or distension. 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   The patient has a history of constipation off and on for the the last many years. There is an associated hematochezia. Blood in wipes mainly.  No associated abdominal pain or distension.   Pt denies nause vomiting or odynophagia or dysphagia. Has history of acid reflux on lansoprazole. There is no history of liver or  "pancreatic disease. He had low HGB before but last HGB was 14g dl.  There is no history of anemia. No prior recent history of EGD.   Last colonoscopy was 2017 and had terminal ileal ulcer. Path most likely NSAID induced ulcer. Has prio h/o colon polyp, No polyps in last colonoscopy. No family history of colon cancer or any GI malignancy.        Subjective      Past Medical History: @[facility]  Past Surgical History:   Past Surgical History:   Procedure Laterality Date   • ABLATION COLPOCLESIS      Spinal   • APPENDECTOMY     • BACK SURGERY  2003    REMOVED BULGING DISC THAT BROKE OFF   • CARDIAC SURGERY      x's 2   • COLONOSCOPY      2017-Chris   • COLONOSCOPY N/A 2/17/2020    Procedure: COLONOSCOPY WITH COLD FORCEP POLYPECTOMY, COLD FORCEP BIOPSIES;  Surgeon: Maksmi Peguero MD;  Location: [facility] ENDOSCOPY;  Service: Gastroenterology;  Laterality: N/A;   • CORONARY ARTERY BYPASS GRAFT  11/2014     MAKER. THEREAFTER, FOUND AN ARTERY WAS KNICKED AND PATIENT TAKEN BACK TO SURGERY TO REPAIR   • ENDOSCOPY     • ENDOSCOPY N/A 8/31/2018    Procedure: ESOPHAGOGASTRODUODENOSCOPY WITH BIOPSY;  Surgeon: Vicky Perez MD;  Location: [facility] ENDOSCOPY;  Service: Gastroenterology   • EYE SURGERY Right     CATARACT   • HERNIA REPAIR  1989    Hiatal hernia   • HERNIA REPAIR  1989    Hiatial   • PROSTATE SURGERY      Prostate cancer s/p removal do PSA   • PROSTATECTOMY     • SKIN BIOPSY      HAVE HAD OVER 200 SPOTS REMOVED OF PRE-MELANOMA   • SMALL INTESTINE SURGERY  1997    REMOVED 12 INCHES OF INTESTINE \"DUE TO NITROGEN BUBBLES FORMING IN INTESTINAL WALLS CAUSING BLISTERS\"       Family History:   Family History   Problem Relation Age of Onset   • Cancer Mother    • Diabetes Father    • Hypertension Father    • Liver disease Father    • Cancer Sister    • Diabetes Brother    • Heart attack Other         grandparent   • Arthritis Other    • Heart attack Other        Social History:   Social History     Socioeconomic " History   • Marital status:      Spouse name: Not on file   • Number of children: Not on file   • Years of education: Not on file   • Highest education level: Not on file   Tobacco Use   • Smoking status: Never Smoker   • Smokeless tobacco: Never Used   Substance and Sexual Activity   • Alcohol use: No   • Drug use: No   • Sexual activity: Defer       Medications:     Current Outpatient Medications:   •  aspirin 81 MG tablet, Take 81 mg by mouth Daily., Disp: , Rfl:   •  atorvastatin (LIPITOR) 40 MG tablet, TAKE 1 TABLET BY MOUTH ONE TIME A DAY , Disp: 90 tablet, Rfl: 0  •  betamethasone dipropionate (DIPROLENE) 0.05 % cream, APPLY TO AFFECTED AREA ONCE DAILY, Disp: 45 g, Rfl: 0  •  carvedilol (COREG) 25 MG tablet, TAKE 1 TABLET BY MOUTH TWO TIMES A DAY , Disp: 180 tablet, Rfl: 0  •  Cholecalciferol (VITAMIN D3) 2000 UNITS tablet, Take 1 tablet by mouth Daily., Disp: , Rfl:   •  gabapentin (NEURONTIN) 400 MG capsule, TAKE 1 CAPSULE BY MOUTH THREE TIMES A DAY  (Patient taking differently: Take 400 mg by mouth 3 (Three) Times a Day.), Disp: 270 capsule, Rfl: 1  •  glucose blood (ONE TOUCH ULTRA TEST) test strip, use to test blood glucose three times daily, Disp: 150 each, Rfl: 0  •  Insulin Pen Needle (BD PEN NEEDLE EDDI U/F) 32G X 4 MM misc, PATIENT TESTING 3-4 TIMES A DAY., Disp: 150 each, Rfl: 11  •  Iron, Ferrous Gluconate, 256 (28 Fe) MG tablet, 1 daily po (Patient taking differently: Take 1 tablet by mouth Daily. 1 daily po), Disp: 30 tablet, Rfl: 3  •  lansoprazole (PREVACID) 30 MG capsule, TAKE 1 CAPSULE BY MOUTH ONE TIME A DAY  (Patient taking differently: Take 30 mg by mouth Daily.), Disp: 90 capsule, Rfl: 0  •  losartan (COZAAR) 100 MG tablet, TAKE 1 TABLET BY MOUTH ONE TIME A DAY , Disp: 90 tablet, Rfl: 0  •  nitroglycerin (NITROSTAT) 0.4 MG SL tablet, Place 1 tablet under the tongue Every 5 (Five) Minutes As Needed for Chest Pain. Take no more than 3 doses in 15 minutes., Disp: 50 tablet, Rfl:  12  •  NOVOLOG FLEXPEN 100 UNIT/ML solution pen-injector sc pen, inject 5 units under the skin 3 times daily with meals as directed, Disp: 15 mL, Rfl: 0  •  polyethylene glycol (MIRALAX) powder, Take 17 g by mouth Daily. (Patient taking differently: Take 17 g by mouth 1 (One) Time.), Disp: 510 g, Rfl: 2  •  Probiotic Product (PROBIOTIC DAILY PO), Take 1 tablet by mouth Daily., Disp: , Rfl:   •  TOUJEO SOLOSTAR 300 UNIT/ML solution pen-injector injection, inject 25 UNITS subcutaneously TWO TIMES A DAY , Disp: 4.5 mL, Rfl: 0  •  traMADol (ULTRAM) 50 MG tablet, Take 1 tablet by mouth 2 (Two) Times a Day As Needed for Moderate Pain ., Disp: 40 tablet, Rfl: 0  •  traZODone (DESYREL) 50 MG tablet, TAKE 1 TABLET BY MOUTH AT BEDTIME , Disp: 30 tablet, Rfl: 3  •  triamcinolone (KENALOG) 0.1 % ointment, Apply  topically to the appropriate area as directed 2 (Two) Times a Day., Disp: 15 g, Rfl: 0    Allergies:   No Known Allergies    Review of Systems:   Review of Systems   Constitutional: Negative for appetite change, fatigue and unexpected weight loss.   Gastrointestinal: Positive for GERD and indigestion. Negative for abdominal distention, abdominal pain, anal bleeding, blood in stool, constipation, diarrhea, nausea, rectal pain and vomiting.       The following portions of the patient's history were reviewed and updated as appropriate: allergies, current medications, past family history, past medical history, past social history, past surgical history and problem list.    Objective     Physical Exam:  Vital Signs: There were no vitals filed for this visit.    Physical Exam not done as this is a telephone visit    Results Review:   I reviewed the patient's new clinical results.    Hospital Outpatient Visit on 02/28/2020   Component Date Value Ref Range Status   • Creatinine 02/28/2020 1.50* 0.60 - 1.30 mg/dL Final    Serial Number: [id number]:  [id number]   Lab on 02/24/2020   Component Date Value Ref Range Status   •  QuantiFERON Criteria 02/24/2020 Comment   Final    The QuantiFERON-TB Gold Plus result is determined by subtracting  the Nil value from either TB antigen (Ag) tube. The mitogen tube  serves as a control for the test.   • QUANTIFERON TB1 AG VALUE 02/24/2020 0.31  IU/mL Final   • QUANTIFERON TB2 AG VALUE 02/24/2020 0.28  IU/mL Final   • QuantiFERON Nil Value 02/24/2020 0.05  IU/mL Final   • QuantiFERON Mitogen Value 02/24/2020 >10.00  IU/mL Final   • QUANTIFERON-TB GOLD PLUS 02/24/2020 Negative  Negative Final    The specimen received for QuantiFERON testing was incubated by the  ordering institution.  Specific procedures outlined in our Directory  of Services and in the package insert for the QuantiFERON Gold  (In Tube) test must be followed to enable for proper stimulation of  cells for the production of interferon gamma.   Office Visit on 02/24/2020   Component Date Value Ref Range Status   • Entamoeba Histolytica Ag 02/26/2020 Negative  Negative Final   Admission on 02/17/2020, Discharged on 02/17/2020   Component Date Value Ref Range Status   • Case Report 02/17/2020    Final                    Value:Surgical Pathology Report                         Case: [id number]                                  Authorizing Provider:  Maksim Peguero MD  Collected:           02/17/2020 11:09 AM          Ordering Location:     [facility]    Received:            02/17/2020 02:01 PM                                 [facility]                                                                    Pathologist:           Keny Steven MD                                                             Specimens:   1) - Large Intestine, terminal ileum ulcer                                                          2) - Large Intestine, Sigmoid Colon                                                       • Final Diagnosis 02/17/2020    Final                    Value:This result contains rich text formatting which cannot be  displayed here.   • Glucose 02/17/2020 86  70 - 130 mg/dL Final    Serial Number: [id number]:  [id number]      [unfilled]    Assessment / Plan      1.  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  NSAID induced versus Crohn's disease.   Patient clearly states that he is not taking any NSAIDs nor he was taking NSAIDs anytime in the recent past.  Biopsies revealed acute inflammation.  Patient did have a similar ulceration 2017.  He did have a ileocolonic resection many years ago for what seemed like a pneumatosis intestinalis as per patient.  Not sure whether this was a Crohn's disease at that time.  His a TB Gold test is negative.  His Entamoeba histolytica antigen was negative.  His a CT enterography done recently does not reveal any signs of proximal small bowel disease or strictures.  From his persistent ulcerations and some mild stricture noted with endoscopy I will treat him with the budesonide 9 mg p.o. daily for 8 weeks  Follow-up in 3 months time    2/24/2020  Suspected NSAID induced terminal ileal ulcers, mild terminal ileal stricture.  Biopsies consistent with acute mild inflammation, no architectural distortion.  He is not on any NSAIDs currently.   He had a prior ileal cecal anastomosis after small bowel resection many years ago.  His last colonoscopy done in 2017 also showed a similar ulcerations and biopsies showed a acute mild inflammation.  He is asymptomatic except mild constipation.  These findings but not consistent with Crohn's disease however given the chronicity and the mild stricture Crohn's disease is a concern.  Will get the TB Gold test, Giardia and amebiasis antigen and also get a CT enterography to assess for any evidence of proximal Crohn's disease.   We will simply monitor for now  We will hold the aspirin for now 2- 4 weeks to enhance the healing     2. Chronic  idiopathic constipation  Now his bowel movements regular once daily and he stopped MiraLAX  We will monitor     3. Gastroesophageal reflux disease without esophagitis  On PPI with good symptomatic control   Will consider reducing the dose to 15 mg Prevacid next visit     4.  Colonic diverticulosis  No signs of any diverticulitis     5.:  Colon Polyps  Small sigmoid polyp path hyperplastic  May not need any further surveillance colonoscopy.  However may need surveillance colonoscopy for his ileal ulcerations, will monitor closely     5.  Internal hemorrhoids  Asymptomatic now    This visit has been rescheduled as a phone visit to comply with patient safety concerns in accordance with CDC recommendations. Total time of discussion was 15 minutes.        Follow Up:   No follow-ups on file.    Maksim Peguero MD  [facility] [place]  4/20/2020  09:18     Please note that portions of this note may have been completed with a voice recognition program. Efforts were made to edit the dictations, but occasionally words are mistranscribed.

## 2020-04-27 RX ORDER — GABAPENTIN 400 MG/1
CAPSULE ORAL
Qty: 270 CAPSULE | Refills: 0 | Status: SHIPPED | OUTPATIENT
Start: 2020-04-27 | End: 2020-08-19 | Stop reason: SDUPTHER

## 2020-04-27 RX ORDER — LANSOPRAZOLE 30 MG/1
30 CAPSULE, DELAYED RELEASE ORAL DAILY
Qty: 90 CAPSULE | Refills: 3 | Status: SHIPPED | OUTPATIENT
Start: 2020-04-27 | End: 2021-03-30 | Stop reason: SDUPTHER

## 2020-05-11 ENCOUNTER — TELEPHONE (OUTPATIENT)
Dept: GASTROENTEROLOGY | Facility: CLINIC | Age: 80
End: 2020-05-11

## 2020-05-11 NOTE — TELEPHONE ENCOUNTER
Patient called today and stated that he is diabetic and that the budesonide is causing his blood sugars to go really high. He also does steroid injections with pain management. He wasn't sure if there was something different that you could prescribe that is not steroid related.    Called him on phone.  Seems like his blood sugar is fluctuating around 300 now part of the cyst with the but is not and partly because of his steroid shot he is not getting for his back pain.  I have advised him to increase his NovoLog to 10 units pre-meal 3 times daily and even go up higher depending on the sugar level and to keep the blood sugar less than 300   We will see him when once his completes the course of budesonide next 4 weeks

## 2020-05-21 RX ORDER — ATORVASTATIN CALCIUM 40 MG/1
TABLET, FILM COATED ORAL
Qty: 90 TABLET | Refills: 1 | Status: SHIPPED | OUTPATIENT
Start: 2020-05-21 | End: 2020-12-07 | Stop reason: SDUPTHER

## 2020-05-21 RX ORDER — CARVEDILOL 25 MG/1
TABLET ORAL
Qty: 180 TABLET | Refills: 1 | Status: SHIPPED | OUTPATIENT
Start: 2020-05-21 | End: 2020-12-18 | Stop reason: SDUPTHER

## 2020-05-21 RX ORDER — BUDESONIDE 3 MG/1
CAPSULE, COATED PELLETS ORAL
Qty: 168 CAPSULE | Refills: 0 | OUTPATIENT
Start: 2020-05-21

## 2020-05-21 RX ORDER — LOSARTAN POTASSIUM 100 MG/1
TABLET ORAL
Qty: 90 TABLET | Refills: 1 | Status: SHIPPED | OUTPATIENT
Start: 2020-05-21

## 2020-05-21 RX ORDER — BETAMETHASONE DIPROPIONATE 0.5 MG/G
CREAM TOPICAL
Qty: 45 G | Refills: 1 | Status: SHIPPED | OUTPATIENT
Start: 2020-05-21 | End: 2022-06-16 | Stop reason: SDUPTHER

## 2020-05-21 RX ORDER — INSULIN GLARGINE 300 U/ML
INJECTION, SOLUTION SUBCUTANEOUS
Qty: 4.5 ML | Refills: 1 | Status: SHIPPED | OUTPATIENT
Start: 2020-05-21 | End: 2020-08-19

## 2020-05-21 NOTE — TELEPHONE ENCOUNTER
We will follow him up after course of budesonide decide on a further course after next appointment

## 2020-07-20 ENCOUNTER — OFFICE VISIT (OUTPATIENT)
Dept: GASTROENTEROLOGY | Facility: CLINIC | Age: 80
End: 2020-07-20

## 2020-07-20 VITALS
WEIGHT: 203 LBS | SYSTOLIC BLOOD PRESSURE: 171 MMHG | HEART RATE: 66 BPM | BODY MASS INDEX: 26.9 KG/M2 | HEIGHT: 73 IN | DIASTOLIC BLOOD PRESSURE: 90 MMHG | TEMPERATURE: 98.4 F

## 2020-07-20 DIAGNOSIS — K50.00 TERMINAL ILEITIS WITHOUT COMPLICATION (HCC): Primary | ICD-10-CM

## 2020-07-20 PROCEDURE — 99213 OFFICE O/P EST LOW 20 MIN: CPT | Performed by: INTERNAL MEDICINE

## 2020-07-20 NOTE — PROGRESS NOTES
Follow Up Note     Date: 2020   Patient Name: Tristan Haines  MRN: [id number]  : 1940     Referring Physician:Cayden Chiang MD    Chief Complaint:    Chief Complaint   Patient presents with   • Follow-up     3 month FU   • ileitis       Interval History:   2020  Tristan Haines is a 80 y.o. male who is here today for follow up for follow-up of his ileitis. Having regular BM now. Takes prn miralax rarely.  Finished his course of steroids  without any issues.   Denies any new symptoms.  He is here to follow-up after course of steroid treatment    Interval History:   2020  Tristan Haines is a 80 y.o. male who is here today for follow up for his small bowel ulcers noted in the colonoscopy.  This visit is to discuss his recent CT enterography report and the blood work.  He states that his bowel movements improved now after he had a bowel prep for colonoscopy.  He is not using his MiraLAX is having a regular bowel movement once daily normal.  Is any abdominal pain no nausea vomiting.     2020  Tristan Haines is a 80 y.o. male who is here today for follow up after colonoscopy and biopsy.  Complains of slight constipation since he had a colonoscopy.  He did not fill his MiraLAX.   No abdominal pain no nausea vomiting.  He is here to discuss the pathology report following his colonoscopy     2/15/2020  Tristan Haines is a 80 y.o. male who is here today to establish care with Gastroenterology for evaluation of change in bowel habit since a year. Complains of pellet like stool firm, small bowl movement. Has to strain a lot but BM daily. BM once in 1-2 days. Takes OTC stool softener.  There is no associated rectal pain, hematochezia or melena. No associated abdominal pain or distension. 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   The patient has a history of constipation off and on for the the last many years. There is an associated hematochezia. Blood in wipes mainly.  No  associated abdominal pain or distension.   Pt denies nause vomiting or odynophagia or dysphagia. Has history of acid reflux on lansoprazole. There is no history of liver or pancreatic disease. He had low HGB before but last HGB was 14g dl.  There is no history of anemia. No prior recent history of EGD.   Last colonoscopy was 2017 and had terminal ileal ulcer. Path most likely NSAID induced ulcer. Has prio h/o colon polyp, No polyps in last colonoscopy. No family history of colon cancer or any GI malignancy.       Subjective      Past Medical History:   Past Medical History:   Diagnosis Date   • Abdominal pain     Hx of.   • Atypical chest pain     Hx of. LAST TIME WAS IN 2017 AND SEEN DR LÓPEZ   • Colon polyps    • COPD (chronic obstructive pulmonary disease) (CMS/HCC)    • Coronary artery disease    • Diabetes mellitus (CMS/HCC)    • Diarrhea     diarrhea, metformin caused, resolved after d/c metformin   • Eczema     Requesting ointment   • Edema of leg    • Elevated cholesterol    • GERD (gastroesophageal reflux disease)    • Hernia     heietal hernia   • History of exercise stress test 2016   • History of kidney stones     ALL PASSED WITHOUT INTERVENTION   • History of nuclear stress test 2016   • Hypertension    • Leg cramps    • Neck pain    • Nephrolithiasis    • Prostate cancer (CMS/HCC)    • Skin lesions    • SOB (shortness of breath)     HISTORY OF. 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 positive    • Vitamin D deficiency      Past Surgical History:   Past Surgical History:   Procedure Laterality Date   • ABLATION COLPOCLESIS      Spinal   • APPENDECTOMY     • BACK SURGERY  2003    REMOVED BULGING DISC THAT BROKE OFF   • CARDIAC SURGERY      x's 2   • COLONOSCOPY      2017-Chris   • COLONOSCOPY N/A 2/17/2020    Procedure: COLONOSCOPY WITH COLD FORCEP POLYPECTOMY, COLD FORCEP BIOPSIES;  Surgeon: Maksim Peguero MD;  Location: [facility] ENDOSCOPY;  Service: Gastroenterology;   "Laterality: N/A;   • CORONARY ARTERY BYPASS GRAFT  11/2014     MAKER. THEREAFTER, FOUND AN ARTERY WAS KNICKED AND PATIENT TAKEN BACK TO SURGERY TO REPAIR   • ENDOSCOPY     • ENDOSCOPY N/A 8/31/2018    Procedure: ESOPHAGOGASTRODUODENOSCOPY WITH BIOPSY;  Surgeon: Vicky Perez MD;  Location: [facility] ENDOSCOPY;  Service: Gastroenterology   • EYE SURGERY Right     CATARACT   • HERNIA REPAIR  1989    Hiatal hernia   • HERNIA REPAIR  1989    Hiatial   • PROSTATE SURGERY      Prostate cancer s/p removal do PSA   • PROSTATECTOMY     • SKIN BIOPSY      HAVE HAD OVER 200 SPOTS REMOVED OF PRE-MELANOMA   • SMALL INTESTINE SURGERY  1997    REMOVED 12 INCHES OF INTESTINE \"DUE TO NITROGEN BUBBLES FORMING IN INTESTINAL WALLS CAUSING BLISTERS\"       Family History:   Family History   Problem Relation Age of Onset   • Cancer Mother    • Diabetes Father    • Hypertension Father    • Liver disease Father    • Cancer Sister    • Diabetes Brother    • Heart attack Other         grandparent   • Arthritis Other    • Heart attack Other        Social History:   Social History     Socioeconomic History   • Marital status:      Spouse name: Not on file   • Number of children: Not on file   • Years of education: Not on file   • Highest education level: Not on file   Tobacco Use   • Smoking status: Never Smoker   • Smokeless tobacco: Never Used   Substance and Sexual Activity   • Alcohol use: No   • Drug use: No   • Sexual activity: Defer       Medications:     Current Outpatient Medications:   •  aspirin 81 MG tablet, Take 81 mg by mouth Daily., Disp: , Rfl:   •  atorvastatin (LIPITOR) 40 MG tablet, TAKE 1 TABLET BY MOUTH ONE TIME A DAY , Disp: 90 tablet, Rfl: 1  •  betamethasone dipropionate (DIPROLENE) 0.05 % cream, APPLY TO AFFECTED AREA ONCE DAILY, Disp: 45 g, Rfl: 1  •  carvedilol (COREG) 25 MG tablet, TAKE 1 TABLET BY MOUTH TWO TIMES A DAY , Disp: 180 tablet, Rfl: 1  •  Cholecalciferol (VITAMIN D3) 2000 UNITS tablet, Take 1 " tablet by mouth Daily., Disp: , Rfl:   •  gabapentin (NEURONTIN) 400 MG capsule, TAKE 1 CAPSULE BY MOUTH THREE TIMES A DAY , Disp: 270 capsule, Rfl: 0  •  glucose blood (ONE TOUCH ULTRA TEST) test strip, use to test blood glucose three times daily for DM E11.9, Disp: 270 each, Rfl: 3  •  Insulin Pen Needle (BD PEN NEEDLE EDDI U/F) 32G X 4 MM misc, PATIENT TESTING 3-4 TIMES A DAY., Disp: 150 each, Rfl: 11  •  lansoprazole (PREVACID) 30 MG capsule, Take 1 capsule by mouth Daily., Disp: 90 capsule, Rfl: 3  •  losartan (COZAAR) 100 MG tablet, TAKE 1 TABLET BY MOUTH ONE TIME A DAY , Disp: 90 tablet, Rfl: 1  •  nitroglycerin (NITROSTAT) 0.4 MG SL tablet, Place 1 tablet under the tongue Every 5 (Five) Minutes As Needed for Chest Pain. Take no more than 3 doses in 15 minutes., Disp: 50 tablet, Rfl: 12  •  NOVOLOG FLEXPEN 100 UNIT/ML solution pen-injector sc pen, inject 5 units under the skin 3 times daily with meals as directed, Disp: 15 mL, Rfl: 0  •  polyethylene glycol (MIRALAX) powder, Take 17 g by mouth Daily. (Patient taking differently: Take 17 g by mouth 1 (One) Time.), Disp: 510 g, Rfl: 2  •  Probiotic Product (PROBIOTIC DAILY PO), Take 1 tablet by mouth Daily., Disp: , Rfl:   •  TOUJEO SOLOSTAR 300 UNIT/ML solution pen-injector injection, INJECT 25 UNITS UNDER THE SKIN TWO TIMES A DAY , Disp: 4.5 mL, Rfl: 1  •  traZODone (DESYREL) 50 MG tablet, TAKE 1 TABLET BY MOUTH AT BEDTIME , Disp: 30 tablet, Rfl: 3    Allergies:   No Known Allergies    Review of Systems:   Review of Systems   Constitutional: Negative for appetite change, fatigue and unexpected weight loss.   Gastrointestinal: Negative for abdominal distention, abdominal pain, anal bleeding, blood in stool, diarrhea, nausea, rectal pain, vomiting, GERD and indigestion.       The following portions of the patient's history were reviewed and updated as appropriate: allergies, current medications, past family history, past medical history, past social history,  "past surgical history and problem list.    Objective     Physical Exam:  Vital Signs:   Vitals:    07/20/20 1428   BP: 171/90   Pulse: 66   Temp: 98.4 °F (36.9 °C)   TempSrc: Temporal   Weight: 92.1 kg (203 lb)   Height: 185.4 cm (72.99\")       Physical Exam   Constitutional: He is oriented to person, place, and time. Vital signs are normal. He appears well-developed and well-nourished.   HENT:   Head: Normocephalic and atraumatic.   Right Ear: External ear normal.   Left Ear: External ear normal.   Nose: Nose normal.   Mouth/Throat: Oropharynx is clear and moist.   Eyes: Conjunctivae are normal.   Neck: Normal range of motion. Neck supple.   Cardiovascular: Normal rate, regular rhythm and normal heart sounds.   Pulmonary/Chest: Effort normal and breath sounds normal.   Abdominal: Soft. Bowel sounds are normal. He exhibits no distension, no ascites and no mass. There is no tenderness.   Musculoskeletal: Normal range of motion.   Neurological: He is alert and oriented to person, place, and time.   Skin: Skin is warm and dry.   Psychiatric: He has a normal mood and affect. His behavior is normal. Judgment normal.   Nursing note and vitals reviewed.      Results Review:   I reviewed the patient's new clinical results.    No visits with results within 90 Day(s) from this visit.   Latest known visit with results is:   Hospital Outpatient Visit on 02/28/2020   Component Date Value Ref Range Status   • Creatinine 02/28/2020 1.50* 0.60 - 1.30 mg/dL Final    Serial Number: [id number]:  [id number]      No radiology results for the last 90 days.    Assessment / Plan      1.  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  He is feeling well now without any GI symptoms.  Bowel movements regular  We will follow-up in 6 months time.  We will schedule him for colonoscopy in 1 year time with repeat evaluation and biopsy of the terminal. "     4/20/2020  Knowledge of this terminal ileal ulcerations and mild stricture is unclear.  NSAID induced versus Crohn's disease.   Patient clearly states that he is not taking any NSAIDs nor he was taking NSAIDs anytime in the recent past.  Biopsies revealed acute inflammation.  Patient did have a similar ulceration 2017.  He did have a ileocolonic resection many years ago for what seemed like a pneumatosis intestinalis as per patient.  Not sure whether this was a Crohn's disease at that time.  His a TB Gold test is negative.  His Entamoeba histolytica antigen was negative.  His a CT enterography done recently does not reveal any signs of proximal small bowel disease or strictures.  From his persistent ulcerations and some mild stricture noted with endoscopy I will treat him with the budesonide 9 mg p.o. daily for 8 weeks  Follow-up in 3 months time     2/24/2020  Suspected NSAID induced terminal ileal ulcers, mild terminal ileal stricture.  Colonoscopy done on 2/17/2020 revealed terminal ileal ulcer, biopsies consistent with acute mild inflammation, no architectural distortion.  He is not on any NSAIDs currently.   He had a prior ileal cecal anastomosis after small bowel resection many years ago.  His last colonoscopy done in 2017 also showed a similar ulcerations and biopsies showed a acute mild inflammation.  He is asymptomatic except mild constipation.  These findings but not consistent with Crohn's disease however given the chronicity and the mild stricture Crohn's disease is a concern.  Will get the TB Gold test, Giardia and amebiasis antigen and also get a CT enterography to assess for any evidence of proximal Crohn's disease.   We will simply monitor for now  We will hold the aspirin for now 2- 4 weeks to enhance the healing       Prior history  2. Chronic idiopathic constipation  Now his bowel movements regular once daily and he stopped MiraLAX  We will monitor     3. Gastroesophageal reflux disease  without esophagitis  On PPI with good symptomatic control   Will consider reducing the dose to 15 mg Prevacid next visit     4.  Colonic diverticulosis  No signs of any diverticulitis     5.:  Colon Polyps  Small sigmoid polyp path hyperplastic  May not need any further surveillance colonoscopy.  However may need surveillance colonoscopy for his ileal ulcerations, will monitor closely     5.  Internal hemorrhoids  Asymptomatic now     This visit has been rescheduled as a phone visit to comply with patient safety concerns in accordance with CDC recommendations. Total time of discussion was 15 minutes.          Follow Up:   No follow-ups on file.    Maksim Peguero MD  [facility]  7/20/2020  15:10     Please note that portions of this note may have been completed with a voice recognition program. Efforts were made to edit the dictations, but occasionally words are mistranscribed.

## 2020-08-13 NOTE — TELEPHONE ENCOUNTER
Caller: CHAVEZ    Relationship:  PHARMACY   Best call back number: [phone]    Medication needed:   Requested Prescriptions     Pending Prescriptions Disp Refills   • Insulin Pen Needle (BD Pen Needle Rimma U/F) 32G X 4 MM misc 150 each 11     Sig: PATIENT TESTING 3-4 TIMES A DAY.       What details did the patient provide when requesting the medication: PATIENT IS OUT COMPLETELY     Does the patient have less than a 3 day supply:  [x] Yes  [] No    What is the patient's preferred pharmacy: [id number] - [place], [place] - [address] - [phone] [facility] [phone] FX

## 2020-08-19 RX ORDER — GABAPENTIN 400 MG/1
400 CAPSULE ORAL 3 TIMES DAILY
Qty: 270 CAPSULE | Refills: 5 | Status: SHIPPED | OUTPATIENT
Start: 2020-08-19 | End: 2020-10-26 | Stop reason: SDUPTHER

## 2020-08-19 RX ORDER — GABAPENTIN 400 MG/1
400 CAPSULE ORAL 3 TIMES DAILY
Qty: 270 CAPSULE | Refills: 0 | Status: CANCELLED | OUTPATIENT
Start: 2020-08-19

## 2020-08-31 ENCOUNTER — TELEPHONE (OUTPATIENT)
Dept: INTERNAL MEDICINE | Facility: CLINIC | Age: 80
End: 2020-08-31

## 2020-08-31 NOTE — TELEPHONE ENCOUNTER
PATIENT REQUEST AN ORDER FOR BLOOD WORK FOR THE STANDARD HEALTH SCREENING. PATIENT HAS A SCHEDULED APPOINTMENT SCHEDULED ON 08/03/2020. PATIENT WOULD LIKE ANOTHER ORDER SUBMITTED SO HE CAN HAVE HIS RESULTS BACK BY THEN. PATIENT TELEPHONE NUMBER VERIFIED [phone]     PLEASE ADVISE

## 2020-09-02 LAB
25(OH)D3+25(OH)D2 SERPL-MCNC: 43.8 NG/ML (ref 30–100)
ALBUMIN SERPL-MCNC: 4.2 G/DL (ref 3.5–5.2)
ALBUMIN/GLOB SERPL: 1.9 G/DL
ALP SERPL-CCNC: 55 U/L (ref 39–117)
ALT SERPL-CCNC: 12 U/L (ref 1–41)
AST SERPL-CCNC: 15 U/L (ref 1–40)
BILIRUB SERPL-MCNC: 0.9 MG/DL (ref 0–1.2)
BUN SERPL-MCNC: 16 MG/DL (ref 8–23)
BUN/CREAT SERPL: 11 (ref 7–25)
CALCIUM SERPL-MCNC: 9.4 MG/DL (ref 8.6–10.5)
CHLORIDE SERPL-SCNC: 107 MMOL/L (ref 98–107)
CHOLEST SERPL-MCNC: 125 MG/DL (ref 0–200)
CK SERPL-CCNC: 132 U/L (ref 20–200)
CO2 SERPL-SCNC: 28.2 MMOL/L (ref 22–29)
CREAT SERPL-MCNC: 1.45 MG/DL (ref 0.76–1.27)
DIFFERENTIAL COMMENT: ABNORMAL
EOSINOPHIL # BLD MANUAL: 0.3 10*3/MM3 (ref 0–0.4)
EOSINOPHIL NFR BLD MANUAL: 5.1 % (ref 0.3–6.2)
ERYTHROCYTE [DISTWIDTH] IN BLOOD BY AUTOMATED COUNT: 13.1 % (ref 12.3–15.4)
GLOBULIN SER CALC-MCNC: 2.2 GM/DL
GLUCOSE SERPL-MCNC: 83 MG/DL (ref 65–99)
HBA1C MFR BLD: 6.37 % (ref 4.8–5.6)
HCT VFR BLD AUTO: 40.8 % (ref 37.5–51)
HDLC SERPL-MCNC: 51 MG/DL (ref 40–60)
HGB BLD-MCNC: 13.9 G/DL (ref 13–17.7)
IRON SATN MFR SERPL: 23 % (ref 20–50)
IRON SERPL-MCNC: 87 MCG/DL (ref 59–158)
LDLC SERPL CALC-MCNC: 56 MG/DL (ref 0–100)
LYMPHOCYTES # BLD MANUAL: 1.85 10*3/MM3 (ref 0.7–3.1)
LYMPHOCYTES NFR BLD MANUAL: 31.3 % (ref 19.6–45.3)
MCH RBC QN AUTO: 30.6 PG (ref 26.6–33)
MCHC RBC AUTO-ENTMCNC: 34.1 G/DL (ref 31.5–35.7)
MCV RBC AUTO: 89.9 FL (ref 79–97)
MICROALBUMIN UR-MCNC: 5.4 UG/ML
MONOCYTES # BLD MANUAL: 0.18 10*3/MM3 (ref 0.1–0.9)
MONOCYTES NFR BLD MANUAL: 3 % (ref 5–12)
NEUTROPHILS # BLD MANUAL: 3.59 10*3/MM3 (ref 1.7–7)
NEUTROPHILS NFR BLD MANUAL: 60.6 % (ref 42.7–76)
PLATELET # BLD AUTO: 142 10*3/MM3 (ref 140–450)
PLATELET BLD QL SMEAR: ABNORMAL
POTASSIUM SERPL-SCNC: 4.2 MMOL/L (ref 3.5–5.2)
PROLACTIN SERPL-MCNC: 18.3 NG/ML (ref 4–15.2)
PROT SERPL-MCNC: 6.4 G/DL (ref 6–8.5)
PSA SERPL-MCNC: <0.014 NG/ML (ref 0–4)
RBC # BLD AUTO: 4.54 10*6/MM3 (ref 4.14–5.8)
RBC MORPH BLD: ABNORMAL
SODIUM SERPL-SCNC: 143 MMOL/L (ref 136–145)
TIBC SERPL-MCNC: 386 MCG/DL
TRIGL SERPL-MCNC: 91 MG/DL (ref 0–150)
TSH SERPL DL<=0.005 MIU/L-ACNC: 1.26 UIU/ML (ref 0.27–4.2)
UIBC SERPL-MCNC: 299 MCG/DL (ref 112–346)
VLDLC SERPL CALC-MCNC: 18.2 MG/DL (ref 5–40)
WBC # BLD AUTO: 5.92 10*3/MM3 (ref 3.4–10.8)

## 2020-09-03 ENCOUNTER — OFFICE VISIT (OUTPATIENT)
Dept: INTERNAL MEDICINE | Facility: CLINIC | Age: 80
End: 2020-09-03

## 2020-09-03 VITALS
WEIGHT: 200.8 LBS | HEART RATE: 56 BPM | DIASTOLIC BLOOD PRESSURE: 82 MMHG | TEMPERATURE: 97.5 F | OXYGEN SATURATION: 96 % | BODY MASS INDEX: 26.61 KG/M2 | SYSTOLIC BLOOD PRESSURE: 126 MMHG | HEIGHT: 73 IN

## 2020-09-03 DIAGNOSIS — E55.9 VITAMIN D DEFICIENCY: ICD-10-CM

## 2020-09-03 DIAGNOSIS — E11.69 TYPE 2 DIABETES MELLITUS WITH OTHER SPECIFIED COMPLICATION, WITHOUT LONG-TERM CURRENT USE OF INSULIN (HCC): ICD-10-CM

## 2020-09-03 DIAGNOSIS — C61 MALIGNANT NEOPLASM OF PROSTATE (HCC): ICD-10-CM

## 2020-09-03 DIAGNOSIS — I25.119 CORONARY ARTERY DISEASE INVOLVING NATIVE CORONARY ARTERY OF NATIVE HEART WITH ANGINA PECTORIS (HCC): Primary | Chronic | ICD-10-CM

## 2020-09-03 DIAGNOSIS — I10 ESSENTIAL HYPERTENSION: ICD-10-CM

## 2020-09-03 DIAGNOSIS — M54.16 LUMBAR RADICULOPATHY: ICD-10-CM

## 2020-09-03 DIAGNOSIS — E78.5 HYPERLIPIDEMIA, UNSPECIFIED HYPERLIPIDEMIA TYPE: ICD-10-CM

## 2020-09-03 DIAGNOSIS — G62.9 PERIPHERAL POLYNEUROPATHY: ICD-10-CM

## 2020-09-03 PROCEDURE — 99214 OFFICE O/P EST MOD 30 MIN: CPT | Performed by: INTERNAL MEDICINE

## 2020-09-03 NOTE — PROGRESS NOTES
Subjective   Tristan Haines is a 80 y.o. male.     Chief Complaint   Patient presents with   • Hypertension     6 mo f/u    • Hyperlipidemia       History of Present Illness   Patient here for follow-up.  Hypertension stable on medication.  Hyperlipidemia stable on medication.  Creatinine stable.  Diabetes A1c 6.37 patient states he experiences low sugar episode at home.  Lumbar radiculopathy ablation helped patient is seeing pain doctor.  CAD stable.  PSA stable.  Colonoscopy in February unremarkable normal hematochezia.  Lip lesion resolved.    Current Outpatient Medications:   •  aspirin 81 MG tablet, Take 81 mg by mouth Daily., Disp: , Rfl:   •  atorvastatin (LIPITOR) 40 MG tablet, TAKE 1 TABLET BY MOUTH ONE TIME A DAY , Disp: 90 tablet, Rfl: 1  •  betamethasone dipropionate (DIPROLENE) 0.05 % cream, APPLY TO AFFECTED AREA ONCE DAILY, Disp: 45 g, Rfl: 1  •  carvedilol (COREG) 25 MG tablet, TAKE 1 TABLET BY MOUTH TWO TIMES A DAY , Disp: 180 tablet, Rfl: 1  •  Cholecalciferol (VITAMIN D3) 2000 UNITS tablet, Take 1 tablet by mouth Daily., Disp: , Rfl:   •  gabapentin (NEURONTIN) 400 MG capsule, Take 1 capsule by mouth 3 (Three) Times a Day., Disp: 270 capsule, Rfl: 5  •  glucose blood (ONE TOUCH ULTRA TEST) test strip, use to test blood glucose three times daily for DM E11.9, Disp: 270 each, Rfl: 3  •  Insulin Glargine, 1 Unit Dial, (Toujeo SoloStar) 300 UNIT/ML solution pen-injector injection, 25u bid sc, Disp: 4.5 mL, Rfl: 11  •  Insulin Pen Needle (BD Pen Needle Rimma U/F) 32G X 4 MM misc, PATIENT TESTING 3-4 TIMES A DAY., Disp: 150 each, Rfl: 11  •  lansoprazole (PREVACID) 30 MG capsule, Take 1 capsule by mouth Daily., Disp: 90 capsule, Rfl: 3  •  losartan (COZAAR) 100 MG tablet, TAKE 1 TABLET BY MOUTH ONE TIME A DAY , Disp: 90 tablet, Rfl: 1  •  nitroglycerin (NITROSTAT) 0.4 MG SL tablet, Place 1 tablet under the tongue Every 5 (Five) Minutes As Needed for Chest Pain. Take no more than 3 doses in 15 minutes.,  Disp: 50 tablet, Rfl: 12  •  NOVOLOG FLEXPEN 100 UNIT/ML solution pen-injector sc pen, inject 5 units under the skin 3 times daily with meals as directed, Disp: 15 mL, Rfl: 0  •  polyethylene glycol (MIRALAX) powder, Take 17 g by mouth Daily. (Patient taking differently: Take 17 g by mouth 1 (One) Time.), Disp: 510 g, Rfl: 2  •  Probiotic Product (PROBIOTIC DAILY PO), Take 1 tablet by mouth Daily., Disp: , Rfl:   •  traZODone (DESYREL) 50 MG tablet, TAKE 1 TABLET BY MOUTH AT BEDTIME , Disp: 30 tablet, Rfl: 3    The following portions of the patient's history were reviewed and updated as appropriate: allergies, current medications, past family history, past medical history, past social history, past surgical history and problem list.    Review of Systems   Constitutional: Negative.    Respiratory: Negative.    Cardiovascular: Negative.    Gastrointestinal: Negative.    Musculoskeletal: Negative.    Skin: Negative.    Neurological: Negative.    Psychiatric/Behavioral: Negative.        Objective   Physical Exam   Constitutional: He is oriented to person, place, and time. He appears well-nourished.   Neck: Neck supple.   Cardiovascular: Normal rate, regular rhythm and normal heart sounds.   Pulmonary/Chest: Effort normal and breath sounds normal.   Abdominal: Bowel sounds are normal.   Neurological: He is alert and oriented to person, place, and time.   Skin: Skin is warm.   Psychiatric: He has a normal mood and affect.       All tests have been reviewed.    Assessment/Plan   There are no diagnoses linked to this encounter.          1. Coronary artery disease involving native coronary artery of native heart with angina pectoris (CMS/[facility]) (Primary) follow-up with cardiologist       2. Hyperlipidemia, continue medication     3. Essential hypertension continue medication     5. Gastroesophageal reflux disease without esophagitis continue medication     6. Vitamin D deficiency continue supplement     7. Fatty liver  diet     8.  Colon polyp, ileitis, terminal, without complications (CMS/HCC) follow up with GI. CT abdomen NSD, colonoscopy 2/2020     9. Type 2 diabetes mellitus with other specified complication, without long-term current use of insulin (CMS/[facility]) cut down toujeo to 10 bid from 14 bid do lab in 3 mo     10. Hearing problem of both ears     11. Lumbar radiculopathy improved after alation follow up with pain clinic     12. Neck pain improved after nerve block     13. Peripheral polyneuropathy        15. Pain of both hip joints     18. Scoliosis of thoracolumbar spine, unspecified scoliosis type, follow up pain clinics/p ablation     20. Chronic hydrocele     21. Calculus of kidney     22. Malignant neoplasm of prostate (CMS/HCC) PSA stable     23. Urinary incontinence, improved after seeing uro and s/p scope     24. Inguinal hernia without obstruction or gangrene, recurrence not specified, unspecified laterality     25. Insomnia, continue medication     26. Edema of lower extremity compression hose.     27. Memory loss stable      30. Benign essential microscopic hematuria seen by urologist     Solar lentigo follow up with dermatologist     Follow Up:  Return in about 6 months (around 8/26/2020) for Recheck.      An After Visit Summary and PPPS were given to the patient.     Below is to historical records for reference only:   Edema of lower extremities arcelia compression hose, 1+ now  Skin cancer follow-up with dermatologist  Memory loss Mini-Mental status examination showed a score of 29.  Continue to watch  Right 4th finger distal lig tear seen by hand surgeon no surgical indication.   Type 2 diabetes mellitus continue med decrease tojeo to 15u from 20 novolog 5 u ac    Essential hypertension continue med   Radiculopathy, cervical  S/p shots and doing better,   Constipation , fiber probiotic, no diarrhea  lumbar disc surgery, 2004 L4-5.  LBP lumbar radiculopathy. continue Flexeril Mobic, MRI L2/3 mod to severe  stenosis. continue follow-up with chiropractor, s/p ablation, follow up with pain clinic  tramadol  neuropathy,neurotin 400mg bid continue unable to tolerate lyrica  sacroiliac inflammation continue chiro. tylenol arthritis.  edema resolved after d/c amlodipine  Coronary artery disease a status post a CABG  continue cardio follow up, aldactone caused high potassium.  Anemia,Due to gastritis per dr solorio continue PPI .  Bright red blood per rectum Hemoccult positive m. w/u lab done.  low ferritin sbft if still anemia. s/p colonoscopy2/17/20,:ileitis and s/p upper endoscopy:esophagitis  eczema refill medicine  diarrhea, metformin caused, resolved after d/c metformin  GERD hx of HH surgery continue medicine  s/p EGD  Hyperlipidemia continue atorvastatin 40 mg   Peripheral neuropathy continue medications  tinnitus, loss of hearing  vision change see eye doctor  leg cramps skin lesions,   hydrocele seen by uro  sleep disturbances, continue trazodone  Td to 2010?, prevnar 13 done, zostavax done, refuse flushot. pneumovax done, shingrix informed  prostate ca s/p removal PSA=0.00, now urine hesitation seen by uro.   Urine incont. Cont merbetrq and follow uro  Sleep apnea, sleep study negative for sleep apnea  Constipation, fiber and water, metamucil improved      CT:gall stone, hydrocele and divertic       CKD watch       Td to HD, patient refuses flu      Gynecomastia and mamm and us done , prolactin slight high, other hormones normal. Watch for now--

## 2020-09-14 DIAGNOSIS — R22.1 MASS OF RIGHT SIDE OF NECK: Primary | ICD-10-CM

## 2020-09-18 ENCOUNTER — HOSPITAL ENCOUNTER (OUTPATIENT)
Dept: ULTRASOUND IMAGING | Facility: HOSPITAL | Age: 80
Discharge: HOME | End: 2020-09-18
Admitting: INTERNAL MEDICINE

## 2020-09-18 PROCEDURE — 76536 US EXAM OF HEAD AND NECK: CPT

## 2020-10-05 DIAGNOSIS — E11.69 TYPE 2 DIABETES MELLITUS WITH OTHER SPECIFIED COMPLICATION, WITHOUT LONG-TERM CURRENT USE OF INSULIN (HCC): Primary | ICD-10-CM

## 2020-10-05 RX ORDER — INSULIN ASPART 100 [IU]/ML
INJECTION, SOLUTION INTRAVENOUS; SUBCUTANEOUS
Qty: 15 ML | Refills: 11 | Status: SHIPPED | OUTPATIENT
Start: 2020-10-05 | End: 2020-12-16 | Stop reason: SDUPTHER

## 2020-10-05 NOTE — TELEPHONE ENCOUNTER
PT CALLED STATING HE IS COMPLETELY OUT OF:   NOVOLOG FLEXPEN 100 UNIT/ML solution pen-injector sc pen     [id number] - [place], [place] - [address] - [phone] PH - [phone] FX

## 2020-10-26 ENCOUNTER — OFFICE VISIT (OUTPATIENT)
Dept: INTERNAL MEDICINE | Facility: CLINIC | Age: 80
End: 2020-10-26

## 2020-10-26 VITALS
WEIGHT: 200.8 LBS | HEART RATE: 62 BPM | TEMPERATURE: 97.8 F | HEIGHT: 73 IN | DIASTOLIC BLOOD PRESSURE: 90 MMHG | OXYGEN SATURATION: 98 % | BODY MASS INDEX: 26.61 KG/M2 | SYSTOLIC BLOOD PRESSURE: 140 MMHG

## 2020-10-26 DIAGNOSIS — G62.9 PERIPHERAL POLYNEUROPATHY: ICD-10-CM

## 2020-10-26 DIAGNOSIS — E11.69 TYPE 2 DIABETES MELLITUS WITH OTHER SPECIFIED COMPLICATION, WITHOUT LONG-TERM CURRENT USE OF INSULIN (HCC): Primary | ICD-10-CM

## 2020-10-26 DIAGNOSIS — I10 ESSENTIAL HYPERTENSION: ICD-10-CM

## 2020-10-26 DIAGNOSIS — I25.119 CORONARY ARTERY DISEASE INVOLVING NATIVE CORONARY ARTERY OF NATIVE HEART WITH ANGINA PECTORIS (HCC): ICD-10-CM

## 2020-10-26 PROBLEM — Z95.1 HX OF CABG: Status: ACTIVE | Noted: 2020-10-26

## 2020-10-26 PROCEDURE — 99214 OFFICE O/P EST MOD 30 MIN: CPT | Performed by: INTERNAL MEDICINE

## 2020-10-26 RX ORDER — GABAPENTIN 400 MG/1
400 CAPSULE ORAL 2 TIMES DAILY
Qty: 60 CAPSULE | Refills: 3 | OUTPATIENT
Start: 2020-10-26 | End: 2021-05-14

## 2020-10-26 NOTE — PROGRESS NOTES
Subjective   Tristan Haines is a 80 y.o. male.     Chief Complaint   Patient presents with   • Diabetes     pt states that he would like to discuss about his insulin as it is still high       History of Present Illness   Patient here for follow-up of.  The patient complains some low sugar episodes.  Patient does have some diet changes recently also.  Peripheral neuropathy patient self lowered the Neurontin to twice a day now.  Patient also complains of 2 weeks ago had an episode of knife cutting left precordial chest pain no short of breath no palpitation no syncopal episode no dizziness no radiation pain.  Pain lasted at 3 minutes.  Not pressure in nature.  Patient checked her blood pressure at that time was high.  And later recovered.  Patient denies any under stress or exertional.  Blood pressure today 140/90.    Current Outpatient Medications:   •  aspirin 81 MG tablet, Take 81 mg by mouth Daily., Disp: , Rfl:   •  atorvastatin (LIPITOR) 40 MG tablet, TAKE 1 TABLET BY MOUTH ONE TIME A DAY , Disp: 90 tablet, Rfl: 1  •  betamethasone dipropionate (DIPROLENE) 0.05 % cream, APPLY TO AFFECTED AREA ONCE DAILY, Disp: 45 g, Rfl: 1  •  carvedilol (COREG) 25 MG tablet, TAKE 1 TABLET BY MOUTH TWO TIMES A DAY , Disp: 180 tablet, Rfl: 1  •  Cholecalciferol (VITAMIN D3) 2000 UNITS tablet, Take 1 tablet by mouth Daily., Disp: , Rfl:   •  gabapentin (NEURONTIN) 400 MG capsule, Take 1 capsule by mouth 2 (two) times a day., Disp: 60 capsule, Rfl: 3  •  glucose blood (ONE TOUCH ULTRA TEST) test strip, use to test blood glucose three times daily for DM E11.9, Disp: 270 each, Rfl: 3  •  insulin aspart (NovoLOG FlexPen) 100 UNIT/ML solution pen-injector sc pen, INJECT 5 UNITS UNDER THE SKIN 3 TIMES DAILY WITH MEALS AS DIRECTED, Disp: 15 mL, Rfl: 11  •  Insulin Glargine, 1 Unit Dial, (Toujeo SoloStar) 300 UNIT/ML solution pen-injector injection, 25u bid sc, Disp: 4.5 mL, Rfl: 11  •  Insulin Pen Needle (BD Pen Needle Rimma U/F) 32G X 4 MM  misc, PATIENT TESTING 3-4 TIMES A DAY., Disp: 150 each, Rfl: 11  •  lansoprazole (PREVACID) 30 MG capsule, Take 1 capsule by mouth Daily., Disp: 90 capsule, Rfl: 3  •  losartan (COZAAR) 100 MG tablet, TAKE 1 TABLET BY MOUTH ONE TIME A DAY , Disp: 90 tablet, Rfl: 1  •  nitroglycerin (NITROSTAT) 0.4 MG SL tablet, Place 1 tablet under the tongue Every 5 (Five) Minutes As Needed for Chest Pain. Take no more than 3 doses in 15 minutes., Disp: 50 tablet, Rfl: 12  •  polyethylene glycol (MIRALAX) powder, Take 17 g by mouth Daily. (Patient taking differently: Take 17 g by mouth 1 (One) Time.), Disp: 510 g, Rfl: 2  •  Probiotic Product (PROBIOTIC DAILY PO), Take 1 tablet by mouth Daily., Disp: , Rfl:   •  traZODone (DESYREL) 50 MG tablet, TAKE 1 TABLET BY MOUTH AT BEDTIME , Disp: 30 tablet, Rfl: 3    The following portions of the patient's history were reviewed and updated as appropriate: allergies, current medications, past family history, past medical history, past social history, past surgical history and problem list.    Review of Systems   Constitutional: Negative.    Respiratory: Negative.    Cardiovascular: Negative.    Gastrointestinal: Negative.    Musculoskeletal: Negative.    Skin: Negative.    Neurological: Negative.    Psychiatric/Behavioral: Negative.        Objective   Physical Exam  HENT:      Head: Normocephalic and atraumatic.      Nose: Nose normal.   Eyes:      Pupils: Pupils are equal, round, and reactive to light.   Neck:      Musculoskeletal: Neck supple.   Cardiovascular:      Rate and Rhythm: Normal rate and regular rhythm.      Heart sounds: Normal heart sounds.   Pulmonary:      Effort: Pulmonary effort is normal.      Breath sounds: Normal breath sounds.   Abdominal:      General: Bowel sounds are normal.   Skin:     General: Skin is warm.   Neurological:      General: No focal deficit present.      Mental Status: He is alert and oriented to person, place, and time.   Psychiatric:         Mood and  Affect: Mood normal.         Behavior: Behavior normal.         All tests have been reviewed.    Assessment/Plan   Diagnoses and all orders for this visit:    Type 2 diabetes mellitus with other specified complication, without long-term current use of insulin (CMS/[facility]) lower the toujeo to 15 daily from 10 bid, and novology 4 u am, 8u lunch and 4 u dinner    Peripheral polyneuropathy patient lower the dose to 400 bid.   -     gabapentin (NEURONTIN) 400 MG capsule; Take 1 capsule by mouth 2 (two) times a day.    Chest pain , hx of CABG , need stress test, cont NTG , and ASA     HTN cont , watch     1 mo

## 2020-11-09 ENCOUNTER — TRANSCRIBE ORDERS (OUTPATIENT)
Dept: LAB | Facility: HOSPITAL | Age: 80
End: 2020-11-09

## 2020-11-09 ENCOUNTER — LAB (OUTPATIENT)
Dept: LAB | Facility: HOSPITAL | Age: 80
End: 2020-11-09

## 2020-11-09 DIAGNOSIS — Z01.818 PRE-OP EXAMINATION: Primary | ICD-10-CM

## 2020-11-09 DIAGNOSIS — Z01.818 PRE-OP EXAMINATION: ICD-10-CM

## 2020-11-09 PROCEDURE — C9803 HOPD COVID-19 SPEC COLLECT: HCPCS

## 2020-11-09 PROCEDURE — U0004 COV-19 TEST NON-CDC HGH THRU: HCPCS

## 2020-11-10 LAB — SARS-COV-2 RNA RESP QL NAA+PROBE: NOT DETECTED

## 2020-11-11 ENCOUNTER — APPOINTMENT (OUTPATIENT)
Dept: NUCLEAR MEDICINE | Facility: HOSPITAL | Age: 80
End: 2020-11-11

## 2020-11-11 ENCOUNTER — HOSPITAL ENCOUNTER (OUTPATIENT)
Dept: NUCLEAR MEDICINE | Facility: HOSPITAL | Age: 80
Discharge: HOME | End: 2020-11-11

## 2020-11-11 ENCOUNTER — TELEPHONE (OUTPATIENT)
Dept: INTERNAL MEDICINE | Facility: CLINIC | Age: 80
End: 2020-11-11

## 2020-11-11 DIAGNOSIS — Z20.822 ENCOUNTER FOR PREOPERATIVE SCREENING LABORATORY TESTING FOR COVID-19 VIRUS: Primary | ICD-10-CM

## 2020-11-11 DIAGNOSIS — Z01.812 ENCOUNTER FOR PREOPERATIVE SCREENING LABORATORY TESTING FOR COVID-19 VIRUS: Primary | ICD-10-CM

## 2020-11-11 PROCEDURE — U0004 COV-19 TEST NON-CDC HGH THRU: HCPCS | Performed by: INTERNAL MEDICINE

## 2020-11-11 NOTE — TELEPHONE ENCOUNTER
Patient called and states he was supposed to have a stress test this morning however was not given any directions from scheduling regarding not taking beta blockers or caffiene. He had both this morning so he had to cancel. He is upset that this information was not relayed to him. He would like to speak with dr luciano personally. He did not reschedule as he is not sure what he wants to do now.

## 2020-11-13 ENCOUNTER — HOSPITAL ENCOUNTER (OUTPATIENT)
Dept: CARDIOLOGY | Facility: HOSPITAL | Age: 80
Discharge: HOME | End: 2020-11-13

## 2020-11-13 ENCOUNTER — APPOINTMENT (OUTPATIENT)
Dept: NUCLEAR MEDICINE | Facility: HOSPITAL | Age: 80
End: 2020-11-13

## 2020-11-13 PROCEDURE — 93018 CV STRESS TEST I&R ONLY: CPT | Performed by: INTERNAL MEDICINE

## 2020-11-13 PROCEDURE — 78452 HT MUSCLE IMAGE SPECT MULT: CPT | Performed by: INTERNAL MEDICINE

## 2020-11-13 PROCEDURE — 25010000002 REGADENOSON 0.4 MG/5ML SOLUTION: Performed by: INTERNAL MEDICINE

## 2020-11-13 PROCEDURE — 0 TECHNETIUM SESTAMIBI: Performed by: INTERNAL MEDICINE

## 2020-11-13 PROCEDURE — A9500 TC99M SESTAMIBI: HCPCS | Performed by: INTERNAL MEDICINE

## 2020-11-13 PROCEDURE — 93017 CV STRESS TEST TRACING ONLY: CPT

## 2020-11-13 PROCEDURE — 78452 HT MUSCLE IMAGE SPECT MULT: CPT

## 2020-11-13 RX ADMIN — TECHNETIUM TC 99M SESTAMIBI 1 DOSE: 1 INJECTION INTRAVENOUS at 09:05

## 2020-11-13 RX ADMIN — REGADENOSON 0.4 MG: 0.08 INJECTION, SOLUTION INTRAVENOUS at 11:41

## 2020-11-13 RX ADMIN — TECHNETIUM TC 99M SESTAMIBI 1 DOSE: 1 INJECTION INTRAVENOUS at 11:44

## 2020-11-23 ENCOUNTER — OFFICE VISIT (OUTPATIENT)
Dept: INTERNAL MEDICINE | Facility: CLINIC | Age: 80
End: 2020-11-23

## 2020-11-23 VITALS
BODY MASS INDEX: 25.71 KG/M2 | RESPIRATION RATE: 16 BRPM | TEMPERATURE: 97.5 F | SYSTOLIC BLOOD PRESSURE: 124 MMHG | HEIGHT: 73 IN | OXYGEN SATURATION: 98 % | HEART RATE: 64 BPM | WEIGHT: 194 LBS | DIASTOLIC BLOOD PRESSURE: 72 MMHG

## 2020-11-23 DIAGNOSIS — I10 ESSENTIAL HYPERTENSION: ICD-10-CM

## 2020-11-23 DIAGNOSIS — K21.9 GASTROESOPHAGEAL REFLUX DISEASE WITHOUT ESOPHAGITIS: ICD-10-CM

## 2020-11-23 DIAGNOSIS — E11.69 TYPE 2 DIABETES MELLITUS WITH OTHER SPECIFIED COMPLICATION, WITHOUT LONG-TERM CURRENT USE OF INSULIN (HCC): ICD-10-CM

## 2020-11-23 DIAGNOSIS — I25.119 CORONARY ARTERY DISEASE INVOLVING NATIVE CORONARY ARTERY OF NATIVE HEART WITH ANGINA PECTORIS (HCC): Primary | Chronic | ICD-10-CM

## 2020-11-23 LAB
BH CV NUCLEAR PRIOR STUDY: 1
BH CV STRESS BP STAGE 2: NORMAL
BH CV STRESS BP STAGE 4: NORMAL
BH CV STRESS COMMENTS STAGE 1: NORMAL
BH CV STRESS DOSE REGADENOSON STAGE 1: 0.4
BH CV STRESS DURATION MIN STAGE 1: 1
BH CV STRESS DURATION MIN STAGE 2: 1
BH CV STRESS DURATION MIN STAGE 3: 1
BH CV STRESS DURATION MIN STAGE 4: 1
BH CV STRESS DURATION SEC STAGE 1: 0
BH CV STRESS DURATION SEC STAGE 2: 0
BH CV STRESS DURATION SEC STAGE 3: 0
BH CV STRESS DURATION SEC STAGE 4: 0
BH CV STRESS GRADE STAGE 1: 10
BH CV STRESS HR STAGE 1: 85
BH CV STRESS HR STAGE 2: 97
BH CV STRESS HR STAGE 3: 93
BH CV STRESS HR STAGE 4: 88
BH CV STRESS METS STAGE 1: 5
BH CV STRESS PROTOCOL 1: NORMAL
BH CV STRESS RECOVERY BP: NORMAL MMHG
BH CV STRESS RECOVERY HR: 87 BPM
BH CV STRESS SPEED STAGE 1: 1.7
BH CV STRESS STAGE 1: 1
BH CV STRESS STAGE 2: 2
BH CV STRESS STAGE 3: 3
BH CV STRESS STAGE 4: 4
LV EF NUC BP: 69 %
MAXIMAL PREDICTED HEART RATE: 140 BPM
PERCENT MAX PREDICTED HR: 69.29 %
STRESS BASELINE BP: NORMAL MMHG
STRESS BASELINE HR: 63 BPM
STRESS O2 SAT REST: 96 %
STRESS PERCENT HR: 82 %
STRESS POST PEAK BP: NORMAL MMHG
STRESS POST PEAK HR: 97 BPM
STRESS TARGET HR: 119 BPM

## 2020-11-23 PROCEDURE — 99214 OFFICE O/P EST MOD 30 MIN: CPT | Performed by: INTERNAL MEDICINE

## 2020-11-23 NOTE — PROGRESS NOTES
Subjective   Tristan Haines is a 80 y.o. male.     Chief Complaint   Patient presents with   • Blood Pressure Check     pt states that he has been having high bp readings ever since he took his stress test.    • Hiatal Hernia       History of Present Illness   Recent blood pressure elevation after chemical stress test.  Denies any headache or blurry vision.  Blood pressure today normal.  Patient complains mild tingling sensation pricking sensation in the left chest after eating.  Epigastric area mild discomfort.  Patient thought to have hiatal hernia again.  Stress test within normal limits.    Current Outpatient Medications:   •  aspirin 81 MG tablet, Take 81 mg by mouth Daily., Disp: , Rfl:   •  atorvastatin (LIPITOR) 40 MG tablet, TAKE 1 TABLET BY MOUTH ONE TIME A DAY , Disp: 90 tablet, Rfl: 1  •  betamethasone dipropionate (DIPROLENE) 0.05 % cream, APPLY TO AFFECTED AREA ONCE DAILY, Disp: 45 g, Rfl: 1  •  carvedilol (COREG) 25 MG tablet, TAKE 1 TABLET BY MOUTH TWO TIMES A DAY , Disp: 180 tablet, Rfl: 1  •  Cholecalciferol (VITAMIN D3) 2000 UNITS tablet, Take 1 tablet by mouth Daily., Disp: , Rfl:   •  gabapentin (NEURONTIN) 400 MG capsule, Take 1 capsule by mouth 2 (two) times a day., Disp: 60 capsule, Rfl: 3  •  glucose blood (ONE TOUCH ULTRA TEST) test strip, use to test blood glucose three times daily for DM E11.9, Disp: 270 each, Rfl: 3  •  insulin aspart (NovoLOG FlexPen) 100 UNIT/ML solution pen-injector sc pen, INJECT 5 UNITS UNDER THE SKIN 3 TIMES DAILY WITH MEALS AS DIRECTED, Disp: 15 mL, Rfl: 11  •  Insulin Glargine, 1 Unit Dial, (Toujeo SoloStar) 300 UNIT/ML solution pen-injector injection, 25u bid sc, Disp: 4.5 mL, Rfl: 11  •  Insulin Pen Needle (BD Pen Needle Rimma U/F) 32G X 4 MM misc, PATIENT TESTING 3-4 TIMES A DAY., Disp: 150 each, Rfl: 11  •  lansoprazole (PREVACID) 30 MG capsule, Take 1 capsule by mouth Daily., Disp: 90 capsule, Rfl: 3  •  losartan (COZAAR) 100 MG tablet, TAKE 1 TABLET BY MOUTH  ONE TIME A DAY , Disp: 90 tablet, Rfl: 1  •  nitroglycerin (NITROSTAT) 0.4 MG SL tablet, Place 1 tablet under the tongue Every 5 (Five) Minutes As Needed for Chest Pain. Take no more than 3 doses in 15 minutes., Disp: 50 tablet, Rfl: 12  •  polyethylene glycol (MIRALAX) powder, Take 17 g by mouth Daily. (Patient taking differently: Take 17 g by mouth 1 (One) Time.), Disp: 510 g, Rfl: 2  •  Probiotic Product (PROBIOTIC DAILY PO), Take 1 tablet by mouth Daily., Disp: , Rfl:   •  traZODone (DESYREL) 50 MG tablet, TAKE 1 TABLET BY MOUTH AT BEDTIME , Disp: 30 tablet, Rfl: 3    The following portions of the patient's history were reviewed and updated as appropriate: allergies, current medications, past family history, past medical history, past social history, past surgical history and problem list.    Review of Systems   Constitutional: Negative.    Respiratory: Negative.    Cardiovascular: Negative.    Gastrointestinal: Negative.    Musculoskeletal: Negative.    Skin: Negative.    Neurological: Negative.    Psychiatric/Behavioral: Negative.        Objective   Physical Exam  Neck:      Musculoskeletal: Neck supple.   Cardiovascular:      Rate and Rhythm: Normal rate and regular rhythm.      Heart sounds: Normal heart sounds.   Pulmonary:      Effort: Pulmonary effort is normal.      Breath sounds: Normal breath sounds.   Abdominal:      General: Bowel sounds are normal.   Skin:     General: Skin is warm.   Neurological:      Mental Status: He is alert and oriented to person, place, and time.         All tests have been reviewed.    Assessment/Plan   There are no diagnoses linked to this encounter.          Type 2 diabetes mellitus with other specified complication, without long-term current use of insulin (CMS/[facility]) lower the toujeo to 15 daily from 10 bid, and novology 4 u am, 8u lunch and 4 u dinner A1c stable now     Peripheral polyneuropathy patient lower the dose to 400 bid.   Patient cut down to once a day doing  well.  -     gabapentin (NEURONTIN) 400 MG capsule; Take 1 capsule by mouth 2 (two) times a day.     Chest pain , hx of CABG , stress test within normal cont NTG , and ASA probably due to reflux disease asked patient to use Pepcid as needed call if no better     HTN probably due to chemical stress test.  Normalizing now.  Continue medication continue to watch.  Follow-up in March for annual wellness check.

## 2020-12-07 RX ORDER — ATORVASTATIN CALCIUM 40 MG/1
40 TABLET, FILM COATED ORAL DAILY
Qty: 90 TABLET | Refills: 3 | Status: SHIPPED | OUTPATIENT
Start: 2020-12-07 | End: 2021-11-12

## 2020-12-07 RX ORDER — TRAZODONE HYDROCHLORIDE 50 MG/1
50 TABLET ORAL
Qty: 90 TABLET | Refills: 3 | Status: SHIPPED | OUTPATIENT
Start: 2020-12-07 | End: 2021-11-12

## 2020-12-07 NOTE — TELEPHONE ENCOUNTER
Caller: Tristan Haines    Relationship: Self    Best call back number: [phone]    Medication needed:   Requested Prescriptions     Pending Prescriptions Disp Refills   • traZODone (DESYREL) 50 MG tablet 30 tablet 3     Sig: Take 1 tablet by mouth every night at bedtime.   • atorvastatin (LIPITOR) 40 MG tablet 90 tablet 1     Sig: Take 1 tablet by mouth Daily. 200001       When do you need the refill by: ASAP    What details did the patient provide when requesting the medication: OUT OF MEDICATION    Does the patient have less than a 3 day supply:  [x] Yes  [] No    What is the patient's preferred pharmacy: [id number] - [place], [place] - [address] - [phone]  - [phone] FX

## 2020-12-16 DIAGNOSIS — E11.69 TYPE 2 DIABETES MELLITUS WITH OTHER SPECIFIED COMPLICATION, WITHOUT LONG-TERM CURRENT USE OF INSULIN (HCC): ICD-10-CM

## 2020-12-16 RX ORDER — INSULIN ASPART 100 [IU]/ML
INJECTION, SOLUTION INTRAVENOUS; SUBCUTANEOUS
Qty: 15 ML | Refills: 11 | Status: SHIPPED | OUTPATIENT
Start: 2020-12-16 | End: 2022-02-03

## 2020-12-16 NOTE — TELEPHONE ENCOUNTER
Caller: Tristan Haines    Relationship: Self    Best call back number: [phone]     Medication needed:   Requested Prescriptions     Pending Prescriptions Disp Refills   • insulin aspart (NovoLOG FlexPen) 100 UNIT/ML solution pen-injector sc pen 15 mL 11     Sig: INJECT 5 UNITS UNDER THE SKIN 3 TIMES DAILY WITH MEALS AS DIRECTED       When do you need the refill by: 12/16/20    What details did the patient provide when requesting the medication: PATIENT WAS TOLD TO INCREASE INSULIN FROM 5 UNITS TO 8 UNITS PER MEAL SO HE ONLY HAS 1 PEN LEFT AND NEEDS A NEW SCRIPT WITH NEW UNITS ON IT.    Does the patient have less than a 3 day supply:  [x] Yes  [] No    What is the patient's preferred pharmacy:    [facility] IN [place]

## 2020-12-18 NOTE — TELEPHONE ENCOUNTER
Caller: Tristan Haines    Relationship: Self    Best call back number: [phone]    Medication needed:   Requested Prescriptions     Pending Prescriptions Disp Refills   • carvedilol (COREG) 25 MG tablet 180 tablet 1     Sig: Take 1 tablet by mouth 2 (Two) Times a Day.       When do you need the refill by: 12/18/2020    What details did the patient provide when requesting the medication: PATIENT STATES THAT HE IS OUT OF THE MEDICATION. PATIENT STATES THAT THE PHARMACY SAID THEY WOULD CONTACT THE OFFICE AND REQUEST THE REFILL. PATIENT WOULD LIKE A CALL WHEN AND IF THE MEDICATION IS SENT TO THE PHARMACY.    Does the patient have less than a 3 day supply:  [x] Yes  [] No    What is the patient's preferred pharmacy: [id number] - [place], [place] - [address] - [phone]  - [phone]

## 2020-12-21 RX ORDER — CARVEDILOL 25 MG/1
25 TABLET ORAL 2 TIMES DAILY
Qty: 180 TABLET | Refills: 3 | Status: SHIPPED | OUTPATIENT
Start: 2020-12-21 | End: 2020-12-22 | Stop reason: SDUPTHER

## 2020-12-22 RX ORDER — CARVEDILOL 25 MG/1
25 TABLET ORAL 2 TIMES DAILY
Qty: 180 TABLET | Refills: 3 | Status: SHIPPED | OUTPATIENT
Start: 2020-12-22 | End: 2021-11-12

## 2020-12-22 NOTE — TELEPHONE ENCOUNTER
Caller: Tristan Haines    Relationship: Self    Best call back number: [phone]    Medication needed:   Requested Prescriptions     Pending Prescriptions Disp Refills   • carvedilol (COREG) 25 MG tablet 180 tablet 3     Sig: Take 1 tablet by mouth 2 (Two) Times a Day.       When do you need the refill by: ASAP    What details did the patient provide when requesting the medication: PATIENT STATES THAT HE HAS CALLED AND HIS MEDICATION HAS NOT BEEN CALLED IN.    Does the patient have less than a 3 day supply:  [x] Yes  [] No    What is the patient's preferred pharmacy: [id number] - [place], [place] - [address] - [phone]  - [phone] FX

## 2021-01-20 ENCOUNTER — OFFICE VISIT (OUTPATIENT)
Dept: GASTROENTEROLOGY | Facility: CLINIC | Age: 81
End: 2021-01-20

## 2021-01-20 VITALS
HEART RATE: 60 BPM | HEIGHT: 73 IN | TEMPERATURE: 97.5 F | RESPIRATION RATE: 16 BRPM | DIASTOLIC BLOOD PRESSURE: 77 MMHG | SYSTOLIC BLOOD PRESSURE: 160 MMHG | BODY MASS INDEX: 26.24 KG/M2 | WEIGHT: 198 LBS

## 2021-01-20 DIAGNOSIS — K59.04 CHRONIC IDIOPATHIC CONSTIPATION: ICD-10-CM

## 2021-01-20 DIAGNOSIS — Z01.818 PREOP TESTING: Primary | ICD-10-CM

## 2021-01-20 DIAGNOSIS — K50.00 TERMINAL ILEITIS WITHOUT COMPLICATION (HCC): Primary | ICD-10-CM

## 2021-01-20 PROCEDURE — 99213 OFFICE O/P EST LOW 20 MIN: CPT | Performed by: INTERNAL MEDICINE

## 2021-01-20 RX ORDER — SODIUM CHLORIDE 9 MG/ML
70 INJECTION, SOLUTION INTRAVENOUS CONTINUOUS PRN
Status: CANCELLED | OUTPATIENT
Start: 2021-01-20

## 2021-01-20 RX ORDER — BISACODYL 5 MG/1
20 TABLET, DELAYED RELEASE ORAL ONCE
Qty: 4 TABLET | Refills: 0 | Status: SHIPPED | OUTPATIENT
Start: 2021-01-20 | End: 2021-01-20

## 2021-01-20 NOTE — PROGRESS NOTES
Follow Up Note     Date: 2021   Patient Name: Tristan Haines  MRN: [id number]  : 1940     Referring Physician: Cayden Chiang MD    Chief Complaint:    Chief Complaint   Patient presents with   • Follow-up   • Terminal Ileitis       Interval History:   2021  Tristan Haines is a 81 y.o. male who is here today for follow up for small bowel disease, inflammation.  He states that he recently developed chest pain mostly on the left side chest and was already seen by cardiology and felt that it is noncardiac origin.  He did have a cardiac stress test done in 2020 which was normal.  No further chest pain episodes recently.  He has been having some constipation recently not taking his MiraLAX.    2020  Tristan Haines is a 80 y.o. male who is here today for follow up for follow-up of his ileitis. Having regular BM now. Takes prn miralax rarely.  Finished his course of steroids  without any issues.   Denies any new symptoms.  He is here to follow-up after course of steroid treatment     Interval History:   2020  Tristan Haines is a 80 y.o. male who is here today for follow up for his small bowel ulcers noted in the colonoscopy.  This visit is to discuss his recent CT enterography report and the blood work.  He states that his bowel movements improved now after he had a bowel prep for colonoscopy.  He is not using his MiraLAX is having a regular bowel movement once daily normal.  Is any abdominal pain no nausea vomiting.     2020  Tristan Haines is a 80 y.o. male who is here today for follow up after colonoscopy and biopsy.  Complains of slight constipation since he had a colonoscopy.  He did not fill his MiraLAX.   No abdominal pain no nausea vomiting.  He is here to discuss the pathology report following his colonoscopy     2/15/2020  Tristan Haines is a 80 y.o. male who is here today to establish care with Gastroenterology for evaluation of change in bowel habit since a year. Complains of  pellet like stool firm, small bowl movement. Has to strain a lot but BM daily. BM once in 1-2 days. Takes OTC stool softener.  There is no associated rectal pain, hematochezia or melena. No associated abdominal pain or distension. 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   The patient has a history of constipation off and on for the the last many years. There is an associated hematochezia. Blood in wipes mainly.  No associated abdominal pain or distension.   Pt denies nause vomiting or odynophagia or dysphagia. Has history of acid reflux on lansoprazole. There is no history of liver or pancreatic disease. He had low HGB before but last HGB was 14g dl.  There is no history of anemia. No prior recent history of EGD.   Last colonoscopy was 2017 and had terminal ileal ulcer. Path most likely NSAID induced ulcer. Has prio h/o colon polyp, No polyps in last colonoscopy. No family history of colon cancer or any GI malignancy.   Subjective      Past Medical History:   Past Medical History:   Diagnosis Date   • Abdominal pain     Hx of.   • Atypical chest pain     Hx of. LAST TIME WAS IN 2017 AND SEEN DR LÓPEZ   • Colon polyps    • COPD (chronic obstructive pulmonary disease) (CMS/HCC)    • Coronary artery disease    • Diabetes mellitus (CMS/HCC)    • Diarrhea     diarrhea, metformin caused, resolved after d/c metformin   • Eczema     Requesting ointment   • Edema of leg    • Elevated cholesterol    • GERD (gastroesophageal reflux disease)    • Hernia     heietal hernia   • History of exercise stress test 2016   • History of kidney stones     ALL PASSED WITHOUT INTERVENTION   • History of nuclear stress test 2016   • Hypertension    • Leg cramps    • Neck pain    • Nephrolithiasis    • Prostate cancer (CMS/HCC)    • Skin lesions    • SOB (shortness of breath)     HISTORY OF. 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 THEREAFTER, FOUND AN ARTERY WAS KNICKED AND PATIENT TAKEN BACK TO SURGERY TO REPAIR   • ENDOSCOPY     • ENDOSCOPY N/A 8/31/2018    Procedure: ESOPHAGOGASTRODUODENOSCOPY WITH BIOPSY;  Surgeon: Vicky Perez MD;  Location: [facility] ENDOSCOPY;  Service: Gastroenterology   • HERNIA REPAIR  1989    Hiatal hernia   • LASER ABLATION      Spinal x8   • PROSTATE SURGERY      Prostate cancer s/p removal do PSA   • PROSTATECTOMY     • SKIN BIOPSY      HAVE HAD OVER 200 SPOTS REMOVED OF PRE-MELANOMA   • SMALL INTESTINE SURGERY  1997    REMOVED 12 INCHES OF INTESTINE \"DUE TO NITROGEN BUBBLES FORMING IN INTESTINAL WALLS CAUSING BLISTERS\"       Family History:   Family History   Problem Relation Age of Onset   • Cancer Mother    • Diabetes Father    • Hypertension Father    • Alcohol abuse Father    • Cirrhosis Father    • Liver cancer Father    • Cancer Sister    • Diabetes Brother    • Colon cancer Brother    • Heart attack Other         grandparent   • Arthritis Other    • Heart attack Other        Social History:   Social History     Socioeconomic History   • Marital status:      Spouse name: Not on file   • Number of children: Not on file   • Years of education: Not on file   • Highest education level: Not on file   Tobacco Use   • Smoking status: Never Smoker   • Smokeless tobacco: Never Used   Substance and Sexual Activity   • Alcohol use: Not Currently     Comment: Hx wine use in past   • Drug use: No   • " Sexual activity: Defer       Medications:     Current Outpatient Medications:   •  aspirin 81 MG tablet, Take 81 mg by mouth Daily., Disp: , Rfl:   •  atorvastatin (LIPITOR) 40 MG tablet, Take 1 tablet by mouth Daily. 200001, Disp: 90 tablet, Rfl: 3  •  betamethasone dipropionate (DIPROLENE) 0.05 % cream, APPLY TO AFFECTED AREA ONCE DAILY, Disp: 45 g, Rfl: 1  •  carvedilol (COREG) 25 MG tablet, Take 1 tablet by mouth 2 (Two) Times a Day., Disp: 180 tablet, Rfl: 3  •  Cholecalciferol (VITAMIN D3) 2000 UNITS tablet, Take 1 tablet by mouth Daily., Disp: , Rfl:   •  gabapentin (NEURONTIN) 400 MG capsule, Take 1 capsule by mouth 2 (two) times a day. (Patient taking differently: Take 800 mg by mouth Every Night.), Disp: 60 capsule, Rfl: 3  •  glucose blood (ONE TOUCH ULTRA TEST) test strip, use to test blood glucose three times daily for DM E11.9, Disp: 270 each, Rfl: 3  •  insulin aspart (NovoLOG FlexPen) 100 UNIT/ML solution pen-injector sc pen, INJECT 8 UNITS UNDER THE SKIN 3 TIMES DAILY WITH MEALS AS DIRECTED, Disp: 15 mL, Rfl: 11  •  Insulin Glargine, 1 Unit Dial, (Toujeo SoloStar) 300 UNIT/ML solution pen-injector injection, 25u bid sc, Disp: 4.5 mL, Rfl: 11  •  Insulin Pen Needle (BD Pen Needle Rimma U/F) 32G X 4 MM misc, PATIENT TESTING 3-4 TIMES A DAY., Disp: 150 each, Rfl: 11  •  lansoprazole (PREVACID) 30 MG capsule, Take 1 capsule by mouth Daily., Disp: 90 capsule, Rfl: 3  •  losartan (COZAAR) 100 MG tablet, TAKE 1 TABLET BY MOUTH ONE TIME A DAY , Disp: 90 tablet, Rfl: 1  •  nitroglycerin (NITROSTAT) 0.4 MG SL tablet, Place 1 tablet under the tongue Every 5 (Five) Minutes As Needed for Chest Pain. Take no more than 3 doses in 15 minutes., Disp: 50 tablet, Rfl: 12  •  Phenylephrine-Cocoa Butter (PREPARATION H RE), Insert  into the rectum., Disp: , Rfl:   •  polyethylene glycol (MIRALAX) powder, Take 17 g by mouth Daily. (Patient taking differently: Take 17 g by mouth 1 (One) Time.), Disp: 510 g, Rfl: 2  •   "Probiotic Product (PROBIOTIC DAILY PO), Take 1 tablet by mouth Daily., Disp: , Rfl:   •  traZODone (DESYREL) 50 MG tablet, Take 1 tablet by mouth every night at bedtime. (Patient taking differently: Take 25 mg by mouth every night at bedtime.), Disp: 90 tablet, Rfl: 3    Allergies:   No Known Allergies    Review of Systems:   Review of Systems   Constitutional: Positive for appetite change, fatigue and unexpected weight loss.   HENT: Negative for trouble swallowing.    Cardiovascular: Negative for chest pain.   Gastrointestinal: Positive for abdominal pain, constipation, nausea, GERD and indigestion. Negative for abdominal distention, anal bleeding, blood in stool, diarrhea, rectal pain and vomiting.       The following portions of the patient's history were reviewed and updated as appropriate: allergies, current medications, past family history, past medical history, past social history, past surgical history and problem list.    Objective     Physical Exam:  Vital Signs:   Vitals:    01/20/21 1403   BP: 160/77   Pulse: 60   Resp: 16   Temp: 97.5 °F (36.4 °C)   Weight: 89.8 kg (198 lb)   Height: 185.4 cm (73\")       Physical Exam  Vitals signs and nursing note reviewed.   Constitutional:       Appearance: Normal appearance. He is well-developed.   HENT:      Head: Normocephalic and atraumatic.      Right Ear: External ear normal.      Left Ear: External ear normal.   Eyes:      Conjunctiva/sclera: Conjunctivae normal.   Neck:      Musculoskeletal: Normal range of motion and neck supple.      Thyroid: No thyromegaly.      Trachea: No tracheal deviation.   Cardiovascular:      Rate and Rhythm: Normal rate and regular rhythm.      Heart sounds: No murmur.   Pulmonary:      Effort: Pulmonary effort is normal. No respiratory distress.      Breath sounds: Normal breath sounds.   Abdominal:      General: Bowel sounds are normal. There is no distension.      Palpations: Abdomen is soft. There is no mass.      Tenderness: " There is no abdominal tenderness. There is no guarding or rebound.      Hernia: No hernia is present.   Musculoskeletal: Normal range of motion.   Skin:     General: Skin is warm and dry.   Neurological:      Mental Status: He is alert and oriented to person, place, and time.      Cranial Nerves: No cranial nerve deficit.      Sensory: No sensory deficit.   Psychiatric:         Behavior: Behavior normal.         Thought Content: Thought content normal.         Judgment: Judgment normal.         Results Review:   I reviewed the patient's new clinical results.    Admission on 11/11/2020, Discharged on 11/11/2020   Component Date Value Ref Range Status   • SARS-CoV-2 BRYAN 11/11/2020 Not Detected  Not Detected Final   Lab on 11/09/2020   Component Date Value Ref Range Status   • SARS-CoV-2 BRYAN 11/09/2020 Not Detected  Not Detected Final   Office Visit on 10/26/2020   Component Date Value Ref Range Status   • BH CV STRESS PROTOCOL 1 11/13/2020 Pharmacologic   Final   • Stage 1 11/13/2020 1   Final   • Duration Min Stage 1 11/13/2020 1   Final   • Duration Sec Stage 1 11/13/2020 0   Final   • Grade Stage 1 11/13/2020 10   Final   • Speed Stage 1 11/13/2020 1.7   Final   • BH CV STRESS METS STAGE 1 11/13/2020 5   Final   • Stress Dose Regadenoson Stage 1 11/13/2020 0.4   Final   • Stress Comments Stage 1 11/13/2020 10 sec bolus injection   Final   • Target HR (85%) 11/13/2020 119  bpm Final   • Max. Pred. HR (100%) 11/13/2020 140  bpm Final   • HR Stage 1 11/13/2020 85   Final   • Stage 2 11/13/2020 2   Final   • HR Stage 2 11/13/2020 97   Final   • BP Stage 2 11/13/2020 160/97   Final   • Duration Min Stage 2 11/13/2020 1   Final   • Duration Sec Stage 2 11/13/2020 0   Final   • Stage 3 11/13/2020 3   Final   • HR Stage 3 11/13/2020 93   Final   • Duration Min Stage 3 11/13/2020 1   Final   • Duration Sec Stage 3 11/13/2020 0   Final   • Stage 4 11/13/2020 4   Final   • HR Stage 4 11/13/2020 88   Final   • BP Stage 4  11/13/2020 191/98   Final   • Duration Min Stage 4 11/13/2020 1   Final   • Duration Sec Stage 4 11/13/2020 0   Final   • Baseline HR 11/13/2020 63  bpm Final   • Baseline BP 11/13/2020 197/104  mmHg Final   • O2 sat rest 11/13/2020 96  % Final   • Peak HR 11/13/2020 97  bpm Final   • Percent Max Pred HR 11/13/2020 69.29  % Final   • Percent Target HR 11/13/2020 82  % Final   • Peak BP 11/13/2020 197/104  mmHg Final   • Recovery HR 11/13/2020 87  bpm Final   • Recovery BP 11/13/2020 185/91  mmHg Final   • Nuc Stress EF 11/13/2020 69  % Final   • Nuclear Prior Study 11/13/2020 1   Final      No radiology results for the last 90 days.    Assessment / Plan      1.  Terminal ileitis with aphthous ulcerations and terminal ileal mild stricture  2.  Suspected Crohn's disease of small bowel  1/20/2021  Apart from worsening constipation no other new GI symptoms.  Denies any blood in the stool.  No abdominal pain as such except intermittent occasional feeling of indigestion in the epigastric region.  He was treated with a short course of budesonide last year.   We will schedule him for colonoscopy to assess the disease again, if he still has a persistent ulceration stricture, we will treat him as a Crohn's disease of the small bowel.  He did have a TB Gold test, Entamoeba antigen test which were negative.     The indications, technique, alternatives and potential risk and complications were discussed with the patient including but not limited to bleeding, bowel perforations, missing lesions and anesthetic complications. The patient understands and wishes to proceed with the procedure and has given their verbal consent. Written patient education information was given to the patient.   The patient will call if they have further questions before procedure.     7/20/2000  Status post short course budesonide 8 weeks.  He is feeling well now without any GI symptoms.  Bowel movements regular  We will follow-up in 6 months time.  We  will schedule him for colonoscopy in 1 year time with repeat evaluation and biopsy of the terminal.      4/20/2020  Nature of this terminal ileal ulcerations and mild stricture is unclear.  NSAID induced versus Crohn's disease.   Patient clearly states that he is not taking any NSAIDs nor he was taking NSAIDs anytime in the recent past.  Biopsies revealed acute inflammation.  Patient did have a similar ulceration 2017.  He did have a ileocolonic resection many years ago for what seemed like a pneumatosis intestinalis as per patient.  Not sure whether this was a Crohn's disease at that time. His a TB Gold test is negative.  His Entamoeba histolytica antigen was negative.  His a CT enterography done recently does not reveal any signs of proximal small bowel disease or strictures. From his persistent ulcerations and some mild stricture noted with endoscopy I will treat him with the budesonide 9 mg p.o. daily for 8 weeks  Follow-up in 3 months time     2/24/2020  Suspected NSAID induced terminal ileal ulcers, mild terminal ileal stricture.  Colonoscopy done on 2/17/2020 revealed terminal ileal ulcer, biopsies consistent with acute mild inflammation, no architectural distortion.  He is not on any NSAIDs currently.   He had a prior ileal cecal anastomosis after small bowel resection many years ago.  His last colonoscopy done in 2017 also showed a similar ulcerations and biopsies showed a acute mild inflammation.  He is asymptomatic except mild constipation.  These findings but not consistent with Crohn's disease however given the chronicity and the mild stricture Crohn's disease is a concern.  Will get the TB Gold test, Giardia and amebiasis antigen and also get a CT enterography to assess for any evidence of proximal Crohn's disease.      3. Chronic idiopathic constipation  This could be related to possible Crohn's.  Bowel movement once in 2 days or three days. Still feels contipated  Advised to retsrat miralsx     Prior  history  4. Gastroesophageal reflux disease without esophagitis  On PPI with good symptomatic control   Will consider reducing the dose to 15 mg Prevacid next visit     5.  Colonic diverticulosis  No signs of any diverticulitis    6.  Internal hemorrhoids  Asymptomatic now            Follow Up:   No follow-ups on file.    Maksim Peguero MD  [facility]  1/20/2021  14:07 EST     Please note that portions of this note may have been completed with a voice recognition program.

## 2021-01-21 PROBLEM — K50.00 TERMINAL ILEITIS WITHOUT COMPLICATION: Status: ACTIVE | Noted: 2021-01-21

## 2021-01-27 DIAGNOSIS — R07.89 ATYPICAL CHEST PAIN: Primary | ICD-10-CM

## 2021-01-27 RX ORDER — SODIUM CHLORIDE 9 MG/ML
70 INJECTION, SOLUTION INTRAVENOUS CONTINUOUS PRN
Status: CANCELLED | OUTPATIENT
Start: 2021-01-27

## 2021-01-28 ENCOUNTER — TELEPHONE (OUTPATIENT)
Dept: GASTROENTEROLOGY | Facility: CLINIC | Age: 81
End: 2021-01-28

## 2021-01-28 PROBLEM — R07.89 ATYPICAL CHEST PAIN: Status: ACTIVE | Noted: 2021-01-21

## 2021-01-28 NOTE — TELEPHONE ENCOUNTER
----- Message from Mariely Vega sent at 1/27/2021  1:09 PM EST -----  Regarding: EGD  The patient calls stating he spoke with someone yesterday asking if he could have an EGD at the same time he has his colonoscopy but no one has called him back.  Please call the patient.  Thank you.

## 2021-02-03 ENCOUNTER — TELEPHONE (OUTPATIENT)
Dept: GASTROENTEROLOGY | Facility: CLINIC | Age: 81
End: 2021-02-03

## 2021-02-03 NOTE — TELEPHONE ENCOUNTER
Patient has cancelled procedures for right now.  He is having back ablation on 02/09, and also is having some BP issues that he is trying to get under control.  Per Dr. Peguero, there is not an emergency reason for scopes right now, patient can have later after back procedure.  Patient will call us after 02/09 and let us know about rescheduling.

## 2021-02-25 DIAGNOSIS — K50.00 ILEITIS, TERMINAL, WITHOUT COMPLICATIONS (HCC): ICD-10-CM

## 2021-02-25 DIAGNOSIS — R07.89 ATYPICAL CHEST PAIN: Primary | ICD-10-CM

## 2021-02-25 RX ORDER — SODIUM CHLORIDE 9 MG/ML
70 INJECTION, SOLUTION INTRAVENOUS CONTINUOUS PRN
Status: CANCELLED | OUTPATIENT
Start: 2021-03-29

## 2021-02-25 RX ORDER — BISACODYL 5 MG/1
20 TABLET, DELAYED RELEASE ORAL ONCE
Qty: 4 TABLET | Refills: 0 | Status: SHIPPED | OUTPATIENT
Start: 2021-02-25 | End: 2021-02-25

## 2021-02-25 RX ORDER — POLYETHYLENE GLYCOL 3350 17 G/17G
238 POWDER, FOR SOLUTION ORAL ONCE
Qty: 238 G | Refills: 0 | Status: SHIPPED | OUTPATIENT
Start: 2021-02-25 | End: 2021-02-25

## 2021-03-03 ENCOUNTER — OFFICE VISIT (OUTPATIENT)
Dept: INTERNAL MEDICINE | Facility: CLINIC | Age: 81
End: 2021-03-03

## 2021-03-03 VITALS
HEIGHT: 73 IN | DIASTOLIC BLOOD PRESSURE: 70 MMHG | HEART RATE: 63 BPM | BODY MASS INDEX: 25.69 KG/M2 | OXYGEN SATURATION: 96 % | WEIGHT: 193.8 LBS | TEMPERATURE: 97.3 F | SYSTOLIC BLOOD PRESSURE: 130 MMHG

## 2021-03-03 DIAGNOSIS — N20.0 CALCULUS OF KIDNEY: ICD-10-CM

## 2021-03-03 DIAGNOSIS — K76.0 FATTY LIVER: ICD-10-CM

## 2021-03-03 DIAGNOSIS — M54.2 NECK PAIN: ICD-10-CM

## 2021-03-03 DIAGNOSIS — R60.0 EDEMA OF LOWER EXTREMITY: ICD-10-CM

## 2021-03-03 DIAGNOSIS — G62.9 PERIPHERAL POLYNEUROPATHY: ICD-10-CM

## 2021-03-03 DIAGNOSIS — T78.40XA ALLERGIC REACTION TO DRUG, INITIAL ENCOUNTER: Primary | ICD-10-CM

## 2021-03-03 DIAGNOSIS — I10 ESSENTIAL HYPERTENSION: ICD-10-CM

## 2021-03-03 DIAGNOSIS — N43.3 CHRONIC HYDROCELE: Chronic | ICD-10-CM

## 2021-03-03 DIAGNOSIS — I25.119 CORONARY ARTERY DISEASE INVOLVING NATIVE CORONARY ARTERY OF NATIVE HEART WITH ANGINA PECTORIS (HCC): Chronic | ICD-10-CM

## 2021-03-03 DIAGNOSIS — M46.1 INFLAMMATION OF SACROILIAC JOINT (HCC): ICD-10-CM

## 2021-03-03 DIAGNOSIS — K21.9 GASTROESOPHAGEAL REFLUX DISEASE WITHOUT ESOPHAGITIS: ICD-10-CM

## 2021-03-03 DIAGNOSIS — E55.9 VITAMIN D DEFICIENCY: ICD-10-CM

## 2021-03-03 DIAGNOSIS — E11.69 TYPE 2 DIABETES MELLITUS WITH OTHER SPECIFIED COMPLICATION, WITHOUT LONG-TERM CURRENT USE OF INSULIN (HCC): ICD-10-CM

## 2021-03-03 DIAGNOSIS — K40.90 INGUINAL HERNIA WITHOUT OBSTRUCTION OR GANGRENE, RECURRENCE NOT SPECIFIED, UNSPECIFIED LATERALITY: Chronic | ICD-10-CM

## 2021-03-03 DIAGNOSIS — Z95.1 HX OF CABG: ICD-10-CM

## 2021-03-03 DIAGNOSIS — K50.00 TERMINAL ILEITIS WITHOUT COMPLICATION (HCC): ICD-10-CM

## 2021-03-03 DIAGNOSIS — C44.90 SKIN CANCER: ICD-10-CM

## 2021-03-03 DIAGNOSIS — K63.5 POLYP OF COLON, UNSPECIFIED PART OF COLON, UNSPECIFIED TYPE: ICD-10-CM

## 2021-03-03 DIAGNOSIS — R93.89 ABNORMAL FINDINGS ON DIAGNOSTIC IMAGING OF OTHER SPECIFIED BODY STRUCTURES: ICD-10-CM

## 2021-03-03 DIAGNOSIS — H91.93 HEARING PROBLEM OF BOTH EARS: Chronic | ICD-10-CM

## 2021-03-03 DIAGNOSIS — G47.00 INSOMNIA, UNSPECIFIED TYPE: Chronic | ICD-10-CM

## 2021-03-03 DIAGNOSIS — M41.9 SCOLIOSIS OF THORACOLUMBAR SPINE, UNSPECIFIED SCOLIOSIS TYPE: ICD-10-CM

## 2021-03-03 DIAGNOSIS — R32 URINARY INCONTINENCE, UNSPECIFIED TYPE: ICD-10-CM

## 2021-03-03 DIAGNOSIS — E78.5 HYPERLIPIDEMIA, UNSPECIFIED HYPERLIPIDEMIA TYPE: ICD-10-CM

## 2021-03-03 DIAGNOSIS — M54.16 LUMBAR RADICULOPATHY: ICD-10-CM

## 2021-03-03 DIAGNOSIS — C61 MALIGNANT NEOPLASM OF PROSTATE (HCC): ICD-10-CM

## 2021-03-03 PROBLEM — R41.3 MEMORY LOSS: Status: RESOLVED | Noted: 2019-06-24 | Resolved: 2021-03-03

## 2021-03-03 PROBLEM — G89.29 CHRONIC LOW BACK PAIN: Status: RESOLVED | Noted: 2017-10-12 | Resolved: 2021-03-03

## 2021-03-03 PROBLEM — M25.551 PAIN OF BOTH HIP JOINTS: Status: RESOLVED | Noted: 2019-12-16 | Resolved: 2021-03-03

## 2021-03-03 PROBLEM — K92.1 HEMATOCHEZIA: Status: RESOLVED | Noted: 2020-01-16 | Resolved: 2021-03-03

## 2021-03-03 PROBLEM — M54.50 CHRONIC LOW BACK PAIN: Status: RESOLVED | Noted: 2017-10-12 | Resolved: 2021-03-03

## 2021-03-03 PROBLEM — M25.552 PAIN OF BOTH HIP JOINTS: Status: RESOLVED | Noted: 2019-12-16 | Resolved: 2021-03-03

## 2021-03-03 PROBLEM — K13.0 LIP LESION: Status: RESOLVED | Noted: 2019-07-24 | Resolved: 2021-03-03

## 2021-03-03 PROBLEM — R07.89 ATYPICAL CHEST PAIN: Status: RESOLVED | Noted: 2021-01-21 | Resolved: 2021-03-03

## 2021-03-03 PROCEDURE — 99214 OFFICE O/P EST MOD 30 MIN: CPT | Performed by: INTERNAL MEDICINE

## 2021-03-03 PROCEDURE — G0439 PPPS, SUBSEQ VISIT: HCPCS | Performed by: INTERNAL MEDICINE

## 2021-03-03 NOTE — PROGRESS NOTES
The ABCs of the Annual Wellness Visit  Subsequent Medicare Wellness Visit    Chief Complaint   Patient presents with   • Medicare Wellness-subsequent       Subjective   History of Present Illness:  Tristan Haines is a 81 y.o. male who presents for a Subsequent Medicare Wellness Visit.    Patient here for follow-up and also Medicare wellness.  Recent chest pain stress test low risk.  Coffee overeating make it worse.  Patient does have hiatal hernia in the past.  Hypertension stable on medication.  Diabetes is stable on medication.  History of ileitis pending repeat upper endoscopy and colonoscopy.  Vitamin deficiency stable on medication.    HEALTH RISK ASSESSMENT    Recent Hospitalizations:  No hospitalization(s) within the last year.    Current Medical Providers:  Patient Care Team:  Cayden Chiang MD as PCP - General (Internal Medicine)  Vicky Perez MD as Consulting Physician (General Surgery)    Smoking Status:  Social History     Tobacco Use   Smoking Status Never Smoker   Smokeless Tobacco Never Used       Alcohol Consumption:  Social History     Substance and Sexual Activity   Alcohol Use Not Currently    Comment: Hx wine use in past       Depression Screen:   PHQ-2/PHQ-9 Depression Screening 3/3/2021   Little interest or pleasure in doing things 0   Feeling down, depressed, or hopeless 0   Trouble falling or staying asleep, or sleeping too much -   Feeling tired or having little energy -   Poor appetite or overeating -   Feeling bad about yourself - or that you are a failure or have let yourself or your family down -   Trouble concentrating on things, such as reading the newspaper or watching television -   Moving or speaking so slowly that other people could have noticed. Or the opposite - being so fidgety or restless that you have been moving around a lot more than usual -   Thoughts that you would be better off dead, or of hurting yourself in some way -   Total Score 0   If you checked off any problems,  how difficult have these problems made it for you to do your work, take care of things at home, or get along with other people? -       Fall Risk Screen:  BO Fall Risk Assessment was completed, and patient is at LOW risk for falls.Assessment completed on:3/3/2021    Health Habits and Functional and Cognitive Screening:  Functional & Cognitive Status 3/3/2021   Do you have difficulty preparing food and eating? No   Do you have difficulty bathing yourself, getting dressed or grooming yourself? No   Do you have difficulty using the toilet? No   Do you have difficulty moving around from place to place? No   Do you have trouble with steps or getting out of a bed or a chair? No   Current Diet Diabetic Diet   Dental Exam Up to date   Eye Exam Not up to date   Exercise (times per week) 0 times per week   Current Exercise Activities Include None   Do you need help using the phone?  No   Are you deaf or do you have serious difficulty hearing?  Yes   Do you need help with transportation? No   Do you need help shopping? No   Do you need help preparing meals?  No   Do you need help with housework?  Yes   Do you need help with laundry? No   Do you need help taking your medications? Yes   Do you need help managing money? Yes   Do you ever drive or ride in a car without wearing a seat belt? No   Have you felt unusual stress, anger or loneliness in the last month? No   Who do you live with? Spouse   If you need help, do you have trouble finding someone available to you? Yes   Have you been bothered in the last four weeks by sexual problems? Yes   Do you have difficulty concentrating, remembering or making decisions? Yes         Does the patient have evidence of cognitive impairment? No    Asprin use counseling:Taking ASA appropriately as indicated    Age-appropriate Screening Schedule:  Refer to the list below for future screening recommendations based on patient's age, sex and/or medical conditions. Orders for these recommended  tests are listed in the plan section. The patient has been provided with a written plan.    Health Maintenance   Topic Date Due   • TDAP/TD VACCINES (1 - Tdap) Never done   • ZOSTER VACCINE (2 of 2) 11/10/2015   • DIABETIC EYE EXAM  09/18/2020   • HEMOGLOBIN A1C  03/01/2021   • INFLUENZA VACCINE  10/26/2021 (Originally 8/1/2020)   • LIPID PANEL  09/01/2021   • URINE MICROALBUMIN  09/01/2021   • COLONOSCOPY  02/17/2030          The following portions of the patient's history were reviewed and updated as appropriate: allergies, current medications, past family history, past medical history, past social history, past surgical history and problem list.    Outpatient Medications Prior to Visit   Medication Sig Dispense Refill   • aspirin 81 MG tablet Take 81 mg by mouth Daily.     • atorvastatin (LIPITOR) 40 MG tablet Take 1 tablet by mouth Daily. 200001 90 tablet 3   • betamethasone dipropionate (DIPROLENE) 0.05 % cream APPLY TO AFFECTED AREA ONCE DAILY 45 g 1   • carvedilol (COREG) 25 MG tablet Take 1 tablet by mouth 2 (Two) Times a Day. 180 tablet 3   • Cholecalciferol (VITAMIN D3) 2000 UNITS tablet Take 1 tablet by mouth Daily.     • gabapentin (NEURONTIN) 400 MG capsule Take 1 capsule by mouth 2 (two) times a day. (Patient taking differently: Take 800 mg by mouth Every Night.) 60 capsule 3   • glucose blood (ONE TOUCH ULTRA TEST) test strip use to test blood glucose three times daily for DM E11.9 270 each 3   • insulin aspart (NovoLOG FlexPen) 100 UNIT/ML solution pen-injector sc pen INJECT 8 UNITS UNDER THE SKIN 3 TIMES DAILY WITH MEALS AS DIRECTED 15 mL 11   • Insulin Glargine, 1 Unit Dial, (Toujeo SoloStar) 300 UNIT/ML solution pen-injector injection 25u bid sc 4.5 mL 11   • Insulin Pen Needle (BD Pen Needle Rimma U/F) 32G X 4 MM misc PATIENT TESTING 3-4 TIMES A DAY. 150 each 11   • lansoprazole (PREVACID) 30 MG capsule Take 1 capsule by mouth Daily. 90 capsule 3   • losartan (COZAAR) 100 MG tablet TAKE 1 TABLET  BY MOUTH ONE TIME A DAY  90 tablet 1   • nitroglycerin (NITROSTAT) 0.4 MG SL tablet Place 1 tablet under the tongue Every 5 (Five) Minutes As Needed for Chest Pain. Take no more than 3 doses in 15 minutes. 50 tablet 12   • Phenylephrine-Cocoa Butter (PREPARATION H RE) Insert  into the rectum.     • polyethylene glycol (MIRALAX) powder Take 17 g by mouth Daily. (Patient taking differently: Take 17 g by mouth 1 (One) Time.) 510 g 2   • Probiotic Product (PROBIOTIC DAILY PO) Take 1 tablet by mouth Daily.     • traZODone (DESYREL) 50 MG tablet Take 1 tablet by mouth every night at bedtime. (Patient taking differently: Take 25 mg by mouth every night at bedtime.) 90 tablet 3     No facility-administered medications prior to visit.       Patient Active Problem List   Diagnosis   • Colon polyp   • Diabetes mellitus (CMS/HCC)   • Edema of lower extremity   • Gastroesophageal reflux disease   • Hyperlipidemia   • Hypertension   • Insomnia   • Lumbar radiculopathy   • Neck pain   • Calculus of kidney   • Peripheral polyneuropathy   • Malignant neoplasm of prostate (CMS/HCC)   • Inflammation of sacroiliac joint (CMS/HCC)   • Vitamin D deficiency   • Hearing problem of both ears   • CAD (coronary artery disease)   • Inguinal hernia   • Chronic hydrocele   • Scoliosis of thoracolumbar spine (S/P Surgery w/ hardware, S/P Epidural pain pump)   • Fatty liver   • Skin cancer   • Urinary incontinence   • Hx of CABG   • Terminal ileitis without complication (CMS/HCC)       Advanced Care Planning:  ACP discussion was held with the patient during this visit. Patient has an advance directive (not in EMR), copy requested.    Review of Systems   Constitutional: Negative.    Respiratory: Negative.    Cardiovascular: Negative.    Gastrointestinal: Negative.    Musculoskeletal: Negative.    Skin: Negative.    Neurological: Negative.    Psychiatric/Behavioral: Negative.        Compared to one year ago, the patient feels his physical health is  "the same.  Compared to one year ago, the patient feels his mental health is the same.    Reviewed chart for potential of high risk medication in the elderly: yes  Reviewed chart for potential of harmful drug interactions in the elderly:no    Objective         Vitals:    03/03/21 1347   BP: 130/70   Pulse: 63   Temp: 97.3 °F (36.3 °C)   SpO2: 96%   Weight: 87.9 kg (193 lb 12.8 oz)   Height: 185.4 cm (73\")       Body mass index is 25.57 kg/m².  Discussed the patient's BMI with him. The BMI is above average; BMI management plan is completed.    Physical Exam  Neck:      Musculoskeletal: Neck supple.   Cardiovascular:      Rate and Rhythm: Normal rate and regular rhythm.      Heart sounds: Normal heart sounds.   Pulmonary:      Effort: Pulmonary effort is normal.      Breath sounds: Normal breath sounds.   Abdominal:      General: Bowel sounds are normal.   Skin:     General: Skin is warm.   Neurological:      Mental Status: He is alert and oriented to person, place, and time.               Assessment/Plan   Medicare Risks and Personalized Health Plan  CMS Preventative Services Quick Reference  Advance Directive Discussion    The above risks/problems have been discussed with the patient.  Pertinent information has been shared with the patient in the After Visit Summary.  Follow up plans and orders are seen below in the Assessment/Plan Section.    Diagnoses and all orders for this visit:        2. Essential hypertension continue medication    3. Hyperlipidemia, unspecified hyperlipidemia type continue medication  -     Comprehensive Metabolic Panel  -     Lipid Panel  -     CK    4. Hx of CABG    5. Coronary artery disease involving native coronary artery of native heart with angina pectoris (CMS/[facility]) continue medication  -     CBC & Differential    6. Hearing problem of both ears    7. Vitamin D deficiency continue supplement    8. Type 2 diabetes mellitus with other specified complication, without long-term current use " of insulin (CMS/[facility]) continue medication  -     Hemoglobin A1c  -     MicroAlbumin, Urine, Random - Urine, Clean Catch    9. Terminal ileitis without complication (CMS/HCC) pending colonoscopy    10. Inguinal hernia without obstruction or gangrene, recurrence not specified, unspecified laterality    11. Gastroesophageal reflux disease without esophagitis pending upper endoscopy    12. Fatty liver check lab    14. Urinary incontinence, unspecified type    15. Chronic hydrocele    16. Calculus of kidney    18. Malignant neoplasm of prostate (CMS/[facility]) check lab  -     PSA DIAGNOSTIC    19. Peripheral polyneuropathy continue medication    20. Lumbar radiculopathy follow-up with pain clinic    21. Insomnia, unspecified type continue medication    22. Edema of lower extremity mild    23. Scoliosis of thoracolumbar spine, unspecified scoliosis type follow-up with pain clinic    24. Inflammation of sacroiliac joint (CMS/[facility]) stable    25. Neck pain continue to watch      pending colon and EGD  Chest pain normal stress test ?relate to .  Follow Up:  Return in about 6 months (around 9/3/2021) for Recheck.     An After Visit Summary and PPPS were given to the patient.     ----Below is to historical records for reference only:   Edema of lower extremities arcelia compression hose, 1+ now  Skin cancer follow-up with dermatologist  Memory loss Mini-Mental status examination showed a score of 29.  Continue to watch  Right 4th finger distal lig tear seen by hand surgeon no surgical indication.    Radiculopathy, cervical  S/p shots and doing better,   Constipation , fiber probiotic, no diarrhea  lumbar disc surgery, 2004 L4-5.  LBP lumbar radiculopathy. continue Flexeril Mobic, MRI L2/3 mod to severe stenosis. continue follow-up with chiropractor, s/p ablation, follow up with pain clinic  tramadol  neuropathy,neurotin 400mg bid continue unable to tolerate lyrica  sacroiliac inflammation continue chiro. tylenol arthritis.  edema  resolved after d/c amlodipine   aldactone caused high potassium.  Anemia resolved. Due to gastritis per dr solorio continue PPI .  Bright red blood per rectum Hemoccult positive m. w/u lab done.  low ferritin sbft if still anemia. s/p colonoscopy2/17/20,:ileitis and s/p upper endoscopy:esophagitis  eczema refill medicine  diarrhea, metformin caused, resolved after d/c metformin  GERD hx of HH surgery continue medicine  s/p EGD  Peripheral neuropathy continue medications  tinnitus, loss of hearing  vision change see eye doctor  leg cramps skin lesions,   hydrocele seen by uro  sleep disturbances, continue trazodone  Td to 2010?, prevnar 13 done, zostavax done, refuse flushot. pneumovax done, shingrix informed  prostate ca s/p removal PSA=0.00, now urine hesitation seen by uro.   Urine incont. Cont merbetrq and follow uro  Sleep apnea, sleep study negative for sleep apnea  Constipation, fiber and water, metamucil improved      CT:gall stone, hydrocele and divertic       CKD watch       Td to HD, patient refuses flu      Gynecomastia and mamm and us done , prolactin slight high, other hormones normal. Watch for now--

## 2021-03-10 PROBLEM — T78.40XA ALLERGIC DRUG REACTION: Status: RESOLVED | Noted: 2017-11-07 | Resolved: 2021-03-10

## 2021-03-11 LAB
ALBUMIN SERPL-MCNC: 4.1 G/DL (ref 3.5–5.2)
ALBUMIN/GLOB SERPL: 1.9 G/DL
ALP SERPL-CCNC: 55 U/L (ref 39–117)
ALT SERPL-CCNC: 10 U/L (ref 1–41)
AST SERPL-CCNC: 17 U/L (ref 1–40)
BASOPHILS # BLD AUTO: 0.05 10*3/MM3 (ref 0–0.2)
BASOPHILS NFR BLD AUTO: 1 % (ref 0–1.5)
BILIRUB SERPL-MCNC: 0.5 MG/DL (ref 0–1.2)
BUN SERPL-MCNC: 13 MG/DL (ref 8–23)
BUN/CREAT SERPL: 8.8 (ref 7–25)
CALCIUM SERPL-MCNC: 9.2 MG/DL (ref 8.6–10.5)
CHLORIDE SERPL-SCNC: 105 MMOL/L (ref 98–107)
CHOLEST SERPL-MCNC: 137 MG/DL (ref 0–200)
CK SERPL-CCNC: 180 U/L (ref 20–200)
CO2 SERPL-SCNC: 27.6 MMOL/L (ref 22–29)
CREAT SERPL-MCNC: 1.48 MG/DL (ref 0.76–1.27)
EOSINOPHIL # BLD AUTO: 0.2 10*3/MM3 (ref 0–0.4)
EOSINOPHIL NFR BLD AUTO: 3.8 % (ref 0.3–6.2)
ERYTHROCYTE [DISTWIDTH] IN BLOOD BY AUTOMATED COUNT: 12.7 % (ref 12.3–15.4)
GLOBULIN SER CALC-MCNC: 2.2 GM/DL
GLUCOSE SERPL-MCNC: 119 MG/DL (ref 65–99)
HBA1C MFR BLD: 6.3 % (ref 4.8–5.6)
HCT VFR BLD AUTO: 41.3 % (ref 37.5–51)
HDLC SERPL-MCNC: 64 MG/DL (ref 40–60)
HGB BLD-MCNC: 14 G/DL (ref 13–17.7)
IMM GRANULOCYTES # BLD AUTO: 0.02 10*3/MM3 (ref 0–0.05)
IMM GRANULOCYTES NFR BLD AUTO: 0.4 % (ref 0–0.5)
LDLC SERPL CALC-MCNC: 60 MG/DL (ref 0–100)
LYMPHOCYTES # BLD AUTO: 1.72 10*3/MM3 (ref 0.7–3.1)
LYMPHOCYTES NFR BLD AUTO: 33 % (ref 19.6–45.3)
MCH RBC QN AUTO: 31.3 PG (ref 26.6–33)
MCHC RBC AUTO-ENTMCNC: 33.9 G/DL (ref 31.5–35.7)
MCV RBC AUTO: 92.2 FL (ref 79–97)
MICROALBUMIN UR-MCNC: 10.2 UG/ML
MONOCYTES # BLD AUTO: 0.4 10*3/MM3 (ref 0.1–0.9)
MONOCYTES NFR BLD AUTO: 7.7 % (ref 5–12)
NEUTROPHILS # BLD AUTO: 2.82 10*3/MM3 (ref 1.7–7)
NEUTROPHILS NFR BLD AUTO: 54.1 % (ref 42.7–76)
NRBC BLD AUTO-RTO: 0 /100 WBC (ref 0–0.2)
PLATELET # BLD AUTO: 142 10*3/MM3 (ref 140–450)
POTASSIUM SERPL-SCNC: 4.5 MMOL/L (ref 3.5–5.2)
PROT SERPL-MCNC: 6.3 G/DL (ref 6–8.5)
PSA SERPL-MCNC: <0.014 NG/ML (ref 0–4)
RBC # BLD AUTO: 4.48 10*6/MM3 (ref 4.14–5.8)
SODIUM SERPL-SCNC: 142 MMOL/L (ref 136–145)
TRIGL SERPL-MCNC: 60 MG/DL (ref 0–150)
TSH SERPL DL<=0.005 MIU/L-ACNC: 1.43 UIU/ML (ref 0.27–4.2)
VLDLC SERPL CALC-MCNC: 13 MG/DL (ref 5–40)
WBC # BLD AUTO: 5.21 10*3/MM3 (ref 3.4–10.8)

## 2021-03-18 PROBLEM — R07.89 ATYPICAL CHEST PAIN: Status: ACTIVE | Noted: 2021-03-18

## 2021-03-18 PROBLEM — K50.00 ILEITIS, TERMINAL, WITHOUT COMPLICATIONS (HCC): Status: ACTIVE | Noted: 2021-03-18

## 2021-03-25 ENCOUNTER — LAB (OUTPATIENT)
Dept: LAB | Facility: HOSPITAL | Age: 81
End: 2021-03-25

## 2021-03-25 DIAGNOSIS — Z01.818 PREOP TESTING: ICD-10-CM

## 2021-03-25 PROCEDURE — U0005 INFEC AGEN DETEC AMPLI PROBE: HCPCS

## 2021-03-25 PROCEDURE — C9803 HOPD COVID-19 SPEC COLLECT: HCPCS

## 2021-03-25 PROCEDURE — U0004 COV-19 TEST NON-CDC HGH THRU: HCPCS

## 2021-03-26 LAB — SARS-COV-2 RNA NOSE QL NAA+PROBE: NOT DETECTED

## 2021-03-29 ENCOUNTER — HOSPITAL ENCOUNTER (OUTPATIENT)
Facility: HOSPITAL | Age: 81
Setting detail: HOSPITAL OUTPATIENT SURGERY
Discharge: HOME | End: 2021-03-29
Attending: INTERNAL MEDICINE | Admitting: INTERNAL MEDICINE

## 2021-03-29 ENCOUNTER — ANESTHESIA EVENT (OUTPATIENT)
Dept: GASTROENTEROLOGY | Facility: HOSPITAL | Age: 81
End: 2021-03-29

## 2021-03-29 ENCOUNTER — ANESTHESIA (OUTPATIENT)
Dept: GASTROENTEROLOGY | Facility: HOSPITAL | Age: 81
End: 2021-03-29

## 2021-03-29 VITALS
WEIGHT: 187 LBS | OXYGEN SATURATION: 99 % | DIASTOLIC BLOOD PRESSURE: 80 MMHG | BODY MASS INDEX: 24.78 KG/M2 | TEMPERATURE: 98.3 F | HEART RATE: 68 BPM | RESPIRATION RATE: 16 BRPM | SYSTOLIC BLOOD PRESSURE: 148 MMHG | HEIGHT: 73 IN

## 2021-03-29 DIAGNOSIS — K50.00 ILEITIS, TERMINAL, WITHOUT COMPLICATIONS (HCC): ICD-10-CM

## 2021-03-29 DIAGNOSIS — R07.89 ATYPICAL CHEST PAIN: ICD-10-CM

## 2021-03-29 PROCEDURE — 25010000002 PROPOFOL 200 MG/20ML EMULSION: Performed by: NURSE ANESTHETIST, CERTIFIED REGISTERED

## 2021-03-29 PROCEDURE — 43239 EGD BIOPSY SINGLE/MULTIPLE: CPT | Performed by: INTERNAL MEDICINE

## 2021-03-29 PROCEDURE — 45380 COLONOSCOPY AND BIOPSY: CPT | Performed by: INTERNAL MEDICINE

## 2021-03-29 PROCEDURE — 88305 TISSUE EXAM BY PATHOLOGIST: CPT | Performed by: INTERNAL MEDICINE

## 2021-03-29 PROCEDURE — C1726 CATH, BAL DIL, NON-VASCULAR: HCPCS | Performed by: INTERNAL MEDICINE

## 2021-03-29 PROCEDURE — 82962 GLUCOSE BLOOD TEST: CPT

## 2021-03-29 PROCEDURE — 45386 COLONOSCOPY W/BALLOON DILAT: CPT | Performed by: INTERNAL MEDICINE

## 2021-03-29 RX ORDER — LIDOCAINE HYDROCHLORIDE 20 MG/ML
INJECTION, SOLUTION INTRAVENOUS AS NEEDED
Status: DISCONTINUED | OUTPATIENT
Start: 2021-03-29 | End: 2021-03-29 | Stop reason: SURG

## 2021-03-29 RX ORDER — SODIUM CHLORIDE 0.9 % (FLUSH) 0.9 %
10 SYRINGE (ML) INJECTION AS NEEDED
Status: DISCONTINUED | OUTPATIENT
Start: 2021-03-29 | End: 2021-03-29 | Stop reason: HOSPADM

## 2021-03-29 RX ORDER — PROPOFOL 10 MG/ML
INJECTION, EMULSION INTRAVENOUS AS NEEDED
Status: DISCONTINUED | OUTPATIENT
Start: 2021-03-29 | End: 2021-03-29 | Stop reason: SURG

## 2021-03-29 RX ORDER — DEXTROSE, SODIUM CHLORIDE, SODIUM LACTATE, POTASSIUM CHLORIDE, AND CALCIUM CHLORIDE 5; .6; .31; .03; .02 G/100ML; G/100ML; G/100ML; G/100ML; G/100ML
125 INJECTION, SOLUTION INTRAVENOUS CONTINUOUS
Status: DISCONTINUED | OUTPATIENT
Start: 2021-03-29 | End: 2021-03-29 | Stop reason: HOSPADM

## 2021-03-29 RX ORDER — SODIUM CHLORIDE 9 MG/ML
70 INJECTION, SOLUTION INTRAVENOUS CONTINUOUS PRN
Status: DISCONTINUED | OUTPATIENT
Start: 2021-03-29 | End: 2021-03-29

## 2021-03-29 RX ORDER — SIMETHICONE 20 MG/.3ML
EMULSION ORAL AS NEEDED
Status: DISCONTINUED | OUTPATIENT
Start: 2021-03-29 | End: 2021-03-29 | Stop reason: HOSPADM

## 2021-03-29 RX ADMIN — PROPOFOL 100 MG: 10 INJECTION, EMULSION INTRAVENOUS at 10:28

## 2021-03-29 RX ADMIN — PROPOFOL 200 MG: 10 INJECTION, EMULSION INTRAVENOUS at 10:16

## 2021-03-29 RX ADMIN — LIDOCAINE HYDROCHLORIDE 60 MG: 20 INJECTION, SOLUTION INTRAVENOUS at 10:16

## 2021-03-29 RX ADMIN — PROPOFOL 100 MG: 10 INJECTION, EMULSION INTRAVENOUS at 10:21

## 2021-03-29 RX ADMIN — PROPOFOL 100 MG: 10 INJECTION, EMULSION INTRAVENOUS at 10:36

## 2021-03-29 RX ADMIN — SODIUM CHLORIDE, SODIUM LACTATE, POTASSIUM CHLORIDE, CALCIUM CHLORIDE AND DEXTROSE MONOHYDRATE 125 ML/HR: 5; 600; 310; 30; 20 INJECTION, SOLUTION INTRAVENOUS at 08:50

## 2021-03-30 LAB
GLUCOSE BLDC GLUCOMTR-MCNC: 114 MG/DL (ref 70–130)
GLUCOSE BLDC GLUCOMTR-MCNC: 84 MG/DL (ref 70–130)

## 2021-03-30 RX ORDER — NITROGLYCERIN 0.4 MG/1
0.4 TABLET SUBLINGUAL
Qty: 50 TABLET | Refills: 12 | Status: SHIPPED | OUTPATIENT
Start: 2021-03-30

## 2021-03-30 RX ORDER — LANSOPRAZOLE 30 MG/1
30 CAPSULE, DELAYED RELEASE ORAL DAILY
Qty: 90 CAPSULE | Refills: 3 | Status: SHIPPED | OUTPATIENT
Start: 2021-03-30 | End: 2021-05-12 | Stop reason: SDUPTHER

## 2021-03-30 NOTE — TELEPHONE ENCOUNTER
Caller: Tristan Haines    Relationship: Self    Best call back number: [phone]    Medication needed:   Requested Prescriptions     Pending Prescriptions Disp Refills   • nitroglycerin (Nitrostat) 0.4 MG SL tablet 50 tablet 12     Sig: Place 1 tablet under the tongue Every 5 (Five) Minutes As Needed for Chest Pain. Take no more than 3 doses in 15 minutes.   • lansoprazole (PREVACID) 30 MG capsule 90 capsule 3     Sig: Take 1 capsule by mouth Daily.       When do you need the refill by: 03/30/21    What additional details did the patient provide when requesting the medication: [facility] IS OUT OF THESE TWO MEDICATIONS.  HE NEEDS THEM ASAP    Does the patient have less than a 3 day supply:  [x] Yes  [] No    What is the patient's preferred pharmacy: [id number] - [place], [place] - [address] - [phone] [facility] [phone] FX

## 2021-04-01 LAB
LAB AP CASE REPORT: NORMAL
PATH REPORT.FINAL DX SPEC: NORMAL

## 2021-04-29 ENCOUNTER — OFFICE VISIT (OUTPATIENT)
Dept: INTERNAL MEDICINE | Facility: CLINIC | Age: 81
End: 2021-04-29

## 2021-04-29 VITALS
HEIGHT: 73 IN | DIASTOLIC BLOOD PRESSURE: 80 MMHG | TEMPERATURE: 97.8 F | SYSTOLIC BLOOD PRESSURE: 140 MMHG | OXYGEN SATURATION: 98 % | BODY MASS INDEX: 25.31 KG/M2 | WEIGHT: 191 LBS | HEART RATE: 59 BPM

## 2021-04-29 DIAGNOSIS — M54.16 LUMBAR RADICULOPATHY: ICD-10-CM

## 2021-04-29 DIAGNOSIS — H10.12 ALLERGIC CONJUNCTIVITIS OF LEFT EYE: Primary | ICD-10-CM

## 2021-04-29 DIAGNOSIS — E11.69 TYPE 2 DIABETES MELLITUS WITH OTHER SPECIFIED COMPLICATION, WITHOUT LONG-TERM CURRENT USE OF INSULIN (HCC): ICD-10-CM

## 2021-04-29 DIAGNOSIS — I10 ESSENTIAL HYPERTENSION: ICD-10-CM

## 2021-04-29 PROCEDURE — 99214 OFFICE O/P EST MOD 30 MIN: CPT | Performed by: INTERNAL MEDICINE

## 2021-04-29 RX ORDER — OLOPATADINE HYDROCHLORIDE 1 MG/ML
1 SOLUTION/ DROPS OPHTHALMIC 2 TIMES DAILY
Qty: 5 ML | Refills: 0 | Status: SHIPPED | OUTPATIENT
Start: 2021-04-29 | End: 2021-09-08

## 2021-04-29 NOTE — PROGRESS NOTES
Subjective   Tristan Haines is a 81 y.o. male.     Chief Complaint   Patient presents with   • Eye Problem     4 days; itching, burning, watery; saw eye doctor 2 days ago   • Diabetes     increase in insulin   • Back Pain     recent epidural injection       History of Present Illness       Current Outpatient Medications:   •  aspirin 81 MG tablet, Take 81 mg by mouth Daily., Disp: , Rfl:   •  atorvastatin (LIPITOR) 40 MG tablet, Take 1 tablet by mouth Daily. 200001, Disp: 90 tablet, Rfl: 3  •  betamethasone dipropionate (DIPROLENE) 0.05 % cream, APPLY TO AFFECTED AREA ONCE DAILY, Disp: 45 g, Rfl: 1  •  carvedilol (COREG) 25 MG tablet, Take 1 tablet by mouth 2 (Two) Times a Day., Disp: 180 tablet, Rfl: 3  •  Cholecalciferol (VITAMIN D3) 2000 UNITS tablet, Take 1 tablet by mouth Daily., Disp: , Rfl:   •  gabapentin (NEURONTIN) 400 MG capsule, Take 1 capsule by mouth 2 (two) times a day. (Patient taking differently: Take 800 mg by mouth Every Night.), Disp: 60 capsule, Rfl: 3  •  glucose blood (ONE TOUCH ULTRA TEST) test strip, use to test blood glucose three times daily for DM E11.9, Disp: 270 each, Rfl: 3  •  insulin aspart (NovoLOG FlexPen) 100 UNIT/ML solution pen-injector sc pen, INJECT 8 UNITS UNDER THE SKIN 3 TIMES DAILY WITH MEALS AS DIRECTED (Patient taking differently: 8 Units. INJECT 8 UNITS UNDER THE SKIN 3 TIMES DAILY WITH MEALS AS DIRECTED), Disp: 15 mL, Rfl: 11  •  Insulin Glargine, 1 Unit Dial, (Toujeo SoloStar) 300 UNIT/ML solution pen-injector injection, 25u bid sc (Patient taking differently: 25 Units. 25u bid sc), Disp: 4.5 mL, Rfl: 11  •  Insulin Pen Needle (BD Pen Needle Rimma U/F) 32G X 4 MM misc, PATIENT TESTING 3-4 TIMES A DAY., Disp: 150 each, Rfl: 11  •  lansoprazole (PREVACID) 30 MG capsule, Take 1 capsule by mouth Daily., Disp: 90 capsule, Rfl: 3  •  losartan (COZAAR) 100 MG tablet, TAKE 1 TABLET BY MOUTH ONE TIME A DAY , Disp: 90 tablet, Rfl: 1  •  nitroglycerin (Nitrostat) 0.4 MG SL tablet,  Place 1 tablet under the tongue Every 5 (Five) Minutes As Needed for Chest Pain. Take no more than 3 doses in 15 minutes., Disp: 50 tablet, Rfl: 12  •  Phenylephrine-Cocoa Butter (PREPARATION H RE), Insert  into the rectum., Disp: , Rfl:   •  polyethylene glycol (MIRALAX) powder, Take 17 g by mouth Daily. (Patient taking differently: Take 17 g by mouth 1 (One) Time.), Disp: 510 g, Rfl: 2  •  Probiotic Product (PROBIOTIC DAILY PO), Take 1 tablet by mouth Daily., Disp: , Rfl:   •  traZODone (DESYREL) 50 MG tablet, Take 1 tablet by mouth every night at bedtime., Disp: 90 tablet, Rfl: 3  •  olopatadine (Patanol) 0.1 % ophthalmic solution, Administer 1 drop into the left eye 2 (Two) Times a Day., Disp: 5 mL, Rfl: 0    The following portions of the patient's history were reviewed and updated as appropriate: allergies, current medications, past family history, past medical history, past social history, past surgical history and problem list.    Review of Systems   Constitutional: Negative.    Respiratory: Negative.    Cardiovascular: Negative.    Gastrointestinal: Negative.    Musculoskeletal: Positive for back pain.   Skin: Negative.    Neurological: Negative.    Psychiatric/Behavioral: Negative.        Objective   Physical Exam  Cardiovascular:      Rate and Rhythm: Normal rate and regular rhythm.      Heart sounds: Normal heart sounds.   Pulmonary:      Effort: Pulmonary effort is normal.      Breath sounds: Normal breath sounds.   Abdominal:      General: Bowel sounds are normal.   Musculoskeletal:      Cervical back: Neck supple.   Skin:     General: Skin is warm.   Neurological:      Mental Status: He is alert and oriented to person, place, and time.   Psychiatric:         Behavior: Behavior normal.         All tests have been reviewed.    Assessment/Plan   Diagnoses and all orders for this visit:    Allergic conjunctivitis of left eye start med  -     olopatadine (Patanol) 0.1 % ophthalmic solution; Administer 1  drop into the left eye 2 (Two) Times a Day.    Essential hypertension continue blood pressure medication continue to watch if too high patient knows to call    Type 2 diabetes mellitus with other specified complication, without long-term current use of insulin (CMS/[facility]) occasional low sugar may need to lower to 20 units Toujeo patient to adjust    Lumbar radiculopathy stable now    Upper back pain MRI showed multiple level of arthritis bone spur status post epidural no help continue to follow-up with pain doctor.    Follow-up as scheduled in September

## 2021-05-12 ENCOUNTER — OFFICE VISIT (OUTPATIENT)
Dept: GASTROENTEROLOGY | Facility: CLINIC | Age: 81
End: 2021-05-12

## 2021-05-12 ENCOUNTER — TELEPHONE (OUTPATIENT)
Dept: GASTROENTEROLOGY | Facility: CLINIC | Age: 81
End: 2021-05-12

## 2021-05-12 VITALS
DIASTOLIC BLOOD PRESSURE: 80 MMHG | BODY MASS INDEX: 25.31 KG/M2 | SYSTOLIC BLOOD PRESSURE: 140 MMHG | HEIGHT: 73 IN | WEIGHT: 191 LBS | TEMPERATURE: 98 F

## 2021-05-12 DIAGNOSIS — K59.04 CHRONIC IDIOPATHIC CONSTIPATION: ICD-10-CM

## 2021-05-12 DIAGNOSIS — K91.30 GASTROINTESTINAL ANASTOMOTIC STRICTURE: ICD-10-CM

## 2021-05-12 DIAGNOSIS — K50.00 TERMINAL ILEITIS WITHOUT COMPLICATION (HCC): Primary | ICD-10-CM

## 2021-05-12 DIAGNOSIS — K21.9 GASTROESOPHAGEAL REFLUX DISEASE WITHOUT ESOPHAGITIS: ICD-10-CM

## 2021-05-12 PROCEDURE — 99213 OFFICE O/P EST LOW 20 MIN: CPT | Performed by: INTERNAL MEDICINE

## 2021-05-12 RX ORDER — LANSOPRAZOLE 15 MG/1
15 CAPSULE, DELAYED RELEASE ORAL DAILY
Qty: 90 CAPSULE | Refills: 3 | Status: SHIPPED | OUTPATIENT
Start: 2021-05-12 | End: 2021-05-17

## 2021-05-12 NOTE — PROGRESS NOTES
a     Follow Up Note     Date: 2021   Patient Name: Tristan Haines  MRN: [id number]  : 1940     Referring Physician: Cayden Chiang MD    Chief Complaint:    Chief Complaint   Patient presents with   • Follow-up   • Terminal ileitis       Interval History:   2021  Tristan Haines is a 81 y.o. male who is here today for follow up for ileitis, anastomotic stricture.  He states that since the procedure he has been doing very well and he is passing soft stool 1 or 2 daily without any issues.  Denies any abdominal pain.  Previous abdominal pain has resolved now.  No nausea or vomiting.    2021  Tristan Haines is a 81 y.o. male who is here today for follow up for small bowel disease, inflammation.  He states that he recently developed chest pain mostly on the left side chest and was already seen by cardiology and felt that it is noncardiac origin.  He did have a cardiac stress test done in 2020 which was normal.  No further chest pain episodes recently.  He has been having some constipation recently not taking his MiraLAX.     2020  Tristan Haines is a 80 y.o. male who is here today for follow up for follow-up of his ileitis. Having regular BM now. Takes prn miralax rarely.  Finished his course of steroids  without any issues.   Denies any new symptoms.  He is here to follow-up after course of steroid treatment     Interval History:   2020  Tristan Haines is a 80 y.o. male who is here today for follow up for his small bowel ulcers noted in the colonoscopy.  This visit is to discuss his recent CT enterography report and the blood work.  He states that his bowel movements improved now after he had a bowel prep for colonoscopy.  He is not using his MiraLAX is having a regular bowel movement once daily normal.  Is any abdominal pain no nausea vomiting.     2020  Tristan Haines is a 80 y.o. male who is here today for follow up after colonoscopy and biopsy.  Complains of slight constipation since he  had a colonoscopy.  He did not fill his MiraLAX.   No abdominal pain no nausea vomiting.  He is here to discuss the pathology report following his colonoscopy     2/15/2020  Tristan Haines is a 80 y.o. male who is here today to establish care with Gastroenterology for evaluation of change in bowel habit since a year. Complains of pellet like stool firm, small bowl movement. Has to strain a lot but BM daily. BM once in 1-2 days. Takes OTC stool softener.  There is no associated rectal pain, hematochezia or melena. No associated abdominal pain or distension. 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   The patient has a history of constipation off and on for the the last many years. There is an associated hematochezia. Blood in wipes mainly.  No associated abdominal pain or distension.   Pt denies nause vomiting or odynophagia or dysphagia. Has history of acid reflux on lansoprazole. There is no history of liver or pancreatic disease. He had low HGB before but last HGB was 14g dl.  There is no history of anemia. No prior recent history of EGD.   Last colonoscopy was 2017 and had terminal ileal ulcer. Path most likely NSAID induced ulcer. Has prio h/o colon polyp, No polyps in last colonoscopy. No family history of colon cancer or any GI malignancy.     Subjective      Past Medical History:   Past Medical History:   Diagnosis Date   • Abdominal pain     Hx of.   • Arthritis    • Atypical chest pain     Hx of. LAST TIME WAS IN 2017 AND SEEN DR LÓPEZ   • Colon polyps    • COPD (chronic obstructive pulmonary disease) (CMS/HCC)    • Coronary artery disease    • Diabetes mellitus (CMS/HCC)    • Diarrhea     diarrhea, metformin caused, resolved after d/c metformin   • Eczema     Requesting ointment   • Edema of leg    • Elevated cholesterol    • GERD (gastroesophageal reflux disease)    • Hernia     heietal hernia   • History of exercise stress test 2016   • History of kidney stones     ALL  "PASSED WITHOUT INTERVENTION   • History of nuclear stress test 2016   • Hypertension    • Leg cramps    • Neck pain    • Nephrolithiasis    • Neuropathy    • Prostate cancer (CMS/HCC)    • Skin lesions    • SOB (shortness of breath)     HISTORY OF. 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 positive    • Vitamin D deficiency      Past Surgical History:   Past Surgical History:   Procedure Laterality Date   • APPENDECTOMY     • BACK SURGERY  2003    REMOVED BULGING DISC THAT BROKE OFF   • CARDIAC SURGERY      x's 2   • CATARACT EXTRACTION Right    • COLONOSCOPY      2017-Chris   • COLONOSCOPY N/A 2/17/2020    Procedure: COLONOSCOPY WITH COLD FORCEP POLYPECTOMY, COLD FORCEP BIOPSIES;  Surgeon: Maksim Peguero MD;  Location: [facility] ENDOSCOPY;  Service: Gastroenterology;  Laterality: N/A;   • COLONOSCOPY N/A 3/29/2021    Procedure: COLONOSCOPY WITH BIOPSIES AND DILITATION OF SMALL BOWEL STRICTURE;  Surgeon: Maksim Peguero MD;  Location: [facility] ENDOSCOPY;  Service: Gastroenterology;  Laterality: N/A;   • CORONARY ARTERY BYPASS GRAFT  11/2014     MAKER. THEREAFTER, FOUND AN ARTERY WAS KNICKED AND PATIENT TAKEN BACK TO SURGERY TO REPAIR   • ENDOSCOPY     • ENDOSCOPY N/A 8/31/2018    Procedure: ESOPHAGOGASTRODUODENOSCOPY WITH BIOPSY;  Surgeon: Vicky Perez MD;  Location: [facility] ENDOSCOPY;  Service: Gastroenterology   • ENDOSCOPY N/A 3/29/2021    Procedure: ESOPHAGOGASTRODUODENOSCOPY WITH BIOPSIES;  Surgeon: Maksim Peguero MD;  Location: [facility] ENDOSCOPY;  Service: Gastroenterology;  Laterality: N/A;   • HERNIA REPAIR  1989    Hiatal hernia   • LASER ABLATION      Spinal x9   • PROSTATE SURGERY      Prostate cancer s/p removal do PSA   • PROSTATECTOMY     • SKIN BIOPSY      HAVE HAD OVER 200 SPOTS REMOVED OF PRE-MELANOMA   • SMALL INTESTINE SURGERY  1997    REMOVED 12 INCHES OF INTESTINE \"DUE TO NITROGEN BUBBLES FORMING IN INTESTINAL WALLS CAUSING BLISTERS\"       Family " History:   Family History   Problem Relation Age of Onset   • Cancer Mother    • Diabetes Father    • Hypertension Father    • Alcohol abuse Father    • Cirrhosis Father    • Liver cancer Father    • Cancer Sister    • Diabetes Brother    • Colon cancer Brother    • Heart attack Other         grandparent   • Arthritis Other    • Heart attack Other        Social History:   Social History     Socioeconomic History   • Marital status:      Spouse name: Not on file   • Number of children: Not on file   • Years of education: Not on file   • Highest education level: Not on file   Tobacco Use   • Smoking status: Never Smoker   • Smokeless tobacco: Never Used   Vaping Use   • Vaping Use: Never used   Substance and Sexual Activity   • Alcohol use: Not Currently     Comment: Hx wine use in past   • Drug use: No   • Sexual activity: Defer       Medications:     Current Outpatient Medications:   •  aspirin 81 MG tablet, Take 81 mg by mouth Daily., Disp: , Rfl:   •  atorvastatin (LIPITOR) 40 MG tablet, Take 1 tablet by mouth Daily. 200001, Disp: 90 tablet, Rfl: 3  •  betamethasone dipropionate (DIPROLENE) 0.05 % cream, APPLY TO AFFECTED AREA ONCE DAILY, Disp: 45 g, Rfl: 1  •  carvedilol (COREG) 25 MG tablet, Take 1 tablet by mouth 2 (Two) Times a Day., Disp: 180 tablet, Rfl: 3  •  Cholecalciferol (VITAMIN D3) 2000 UNITS tablet, Take 1 tablet by mouth Daily., Disp: , Rfl:   •  gabapentin (NEURONTIN) 400 MG capsule, Take 1 capsule by mouth 2 (two) times a day. (Patient taking differently: Take 800 mg by mouth Every Night.), Disp: 60 capsule, Rfl: 3  •  glucose blood (ONE TOUCH ULTRA TEST) test strip, use to test blood glucose three times daily for DM E11.9, Disp: 270 each, Rfl: 3  •  insulin aspart (NovoLOG FlexPen) 100 UNIT/ML solution pen-injector sc pen, INJECT 8 UNITS UNDER THE SKIN 3 TIMES DAILY WITH MEALS AS DIRECTED (Patient taking differently: 8 Units. INJECT 8 UNITS UNDER THE SKIN 3 TIMES DAILY WITH MEALS AS  DIRECTED), Disp: 15 mL, Rfl: 11  •  Insulin Glargine, 1 Unit Dial, (Toujeo SoloStar) 300 UNIT/ML solution pen-injector injection, 25u bid sc (Patient taking differently: 25 Units. 25u bid sc), Disp: 4.5 mL, Rfl: 11  •  Insulin Pen Needle (BD Pen Needle Rimma U/F) 32G X 4 MM misc, PATIENT TESTING 3-4 TIMES A DAY., Disp: 150 each, Rfl: 11  •  lansoprazole (PREVACID) 30 MG capsule, Take 1 capsule by mouth Daily., Disp: 90 capsule, Rfl: 3  •  losartan (COZAAR) 100 MG tablet, TAKE 1 TABLET BY MOUTH ONE TIME A DAY , Disp: 90 tablet, Rfl: 1  •  nitroglycerin (Nitrostat) 0.4 MG SL tablet, Place 1 tablet under the tongue Every 5 (Five) Minutes As Needed for Chest Pain. Take no more than 3 doses in 15 minutes., Disp: 50 tablet, Rfl: 12  •  olopatadine (Patanol) 0.1 % ophthalmic solution, Administer 1 drop into the left eye 2 (Two) Times a Day., Disp: 5 mL, Rfl: 0  •  Phenylephrine-Cocoa Butter (PREPARATION H RE), Insert  into the rectum., Disp: , Rfl:   •  polyethylene glycol (MIRALAX) powder, Take 17 g by mouth Daily. (Patient taking differently: Take 17 g by mouth 1 (One) Time.), Disp: 510 g, Rfl: 2  •  Probiotic Product (PROBIOTIC DAILY PO), Take 1 tablet by mouth Daily., Disp: , Rfl:   •  traZODone (DESYREL) 50 MG tablet, Take 1 tablet by mouth every night at bedtime., Disp: 90 tablet, Rfl: 3    Allergies:   No Known Allergies    Review of Systems:   Review of Systems   Constitutional: Negative for appetite change, fatigue and unexpected weight loss.   Gastrointestinal: Negative for abdominal distention, abdominal pain, anal bleeding, blood in stool, constipation, diarrhea, nausea, rectal pain, vomiting, GERD and indigestion.       The following portions of the patient's history were reviewed and updated as appropriate: allergies, current medications, past family history, past medical history, past social history, past surgical history and problem list.    Objective     Physical Exam:  Vital Signs:   Vitals:    05/12/21  "1321   BP: 140/80   Temp: 98 °F (36.7 °C)   TempSrc: Temporal   Weight: 86.6 kg (191 lb)   Height: 185.4 cm (73\")       Physical Exam  Vitals and nursing note reviewed.   Constitutional:       Appearance: He is well-developed.   HENT:      Head: Normocephalic and atraumatic.      Right Ear: External ear normal.      Left Ear: External ear normal.   Eyes:      Conjunctiva/sclera: Conjunctivae normal.   Neck:      Thyroid: No thyromegaly.      Trachea: No tracheal deviation.   Cardiovascular:      Rate and Rhythm: Normal rate and regular rhythm.      Heart sounds: No murmur heard.     Pulmonary:      Effort: Pulmonary effort is normal. No respiratory distress.      Breath sounds: Normal breath sounds.   Abdominal:      General: Bowel sounds are normal. There is no distension.      Palpations: Abdomen is soft. There is no mass.      Tenderness: There is no abdominal tenderness.      Hernia: No hernia is present.   Musculoskeletal:         General: Normal range of motion.      Cervical back: Normal range of motion and neck supple.   Skin:     General: Skin is warm and dry.   Neurological:      Mental Status: He is alert and oriented to person, place, and time.      Cranial Nerves: No cranial nerve deficit.      Sensory: No sensory deficit.   Psychiatric:         Mood and Affect: Mood normal.         Behavior: Behavior normal.         Thought Content: Thought content normal.         Judgment: Judgment normal.         Results Review:   I reviewed the patient's new clinical results.    Admission on 03/29/2021, Discharged on 03/29/2021   Component Date Value Ref Range Status   • Case Report 03/29/2021    Final                    Value:Surgical Pathology Report                         Case: [id number]                                  Authorizing Provider:  Maksim Peguero MD  Collected:           03/29/2021 10:16 AM          Ordering Location:     [facility]    Received:            03/29/2021 02:00 PM     "                             SURG ENDO                                                                    Pathologist:           Keny Burr MD                                                       Specimens:   1) - Small Intestine, Duodenum, bx for change in bowel habits                                       2) - Gastric, Antrum, gastric antrum body bx for h-pylori                                           3) - Esophagus, random esophageal bx for atypical chest pains                                       4) - Small Intestine, Ileum,  TERMINAL ILEUM BX FOR ANASTOMATIC ULCER                               5) - Large Intestine, Right / Ascending Colon, SUSPECTED IBD                                                                  6) - Large Intestine, Transverse Colon, BX FOR SUSPECTED IBD                                        7) - Large Intestine, Left / Descending Colon, FOR SUSPECTED IBD                                    8) - Large Intestine, Sigmoid Colon, BX FOR SUSPECTED IBD                                           9) - Large Intestine, Rectum, BX FOR SUSPECTED IBD                                        • Final Diagnosis 03/29/2021    Final                    Value:This result contains rich text formatting which cannot be displayed here.   • Glucose 03/29/2021 84  70 - 130 mg/dL Final    Serial Number: [id number]:  [id number]   • Glucose 03/29/2021 114  70 - 130 mg/dL Final    Serial Number: [id number]:  [id number]   Lab on 03/25/2021   Component Date Value Ref Range Status   • SARS-CoV-2 BRYAN 03/25/2021 Not Detected  Not Detected Final   Office Visit on 03/03/2021   Component Date Value Ref Range Status   • WBC 03/10/2021 5.21  3.40 - 10.80 10*3/mm3 Final   • RBC 03/10/2021 4.48  4.14 - 5.80 10*6/mm3 Final   • Hemoglobin 03/10/2021 14.0  13.0 - 17.7 g/dL Final   • Hematocrit 03/10/2021 41.3  37.5 - 51.0 % Final   • MCV 03/10/2021 92.2  79.0 - 97.0 fL Final   • MCH 03/10/2021 31.3  26.6 - 33.0 pg  Final   • MCHC 03/10/2021 33.9  31.5 - 35.7 g/dL Final   • RDW 03/10/2021 12.7  12.3 - 15.4 % Final   • Platelets 03/10/2021 142  140 - 450 10*3/mm3 Final   • Neutrophil Rel % 03/10/2021 54.1  42.7 - 76.0 % Final   • Lymphocyte Rel % 03/10/2021 33.0  19.6 - 45.3 % Final   • Monocyte Rel % 03/10/2021 7.7  5.0 - 12.0 % Final   • Eosinophil Rel % 03/10/2021 3.8  0.3 - 6.2 % Final   • Basophil Rel % 03/10/2021 1.0  0.0 - 1.5 % Final   • Neutrophils Absolute 03/10/2021 2.82  1.70 - 7.00 10*3/mm3 Final   • Lymphocytes Absolute 03/10/2021 1.72  0.70 - 3.10 10*3/mm3 Final   • Monocytes Absolute 03/10/2021 0.40  0.10 - 0.90 10*3/mm3 Final   • Eosinophils Absolute 03/10/2021 0.20  0.00 - 0.40 10*3/mm3 Final   • Basophils Absolute 03/10/2021 0.05  0.00 - 0.20 10*3/mm3 Final   • Immature Granulocyte Rel % 03/10/2021 0.4  0.0 - 0.5 % Final   • Immature Grans Absolute 03/10/2021 0.02  0.00 - 0.05 10*3/mm3 Final   • nRBC 03/10/2021 0.0  0.0 - 0.2 /100 WBC Final   • Glucose 03/10/2021 119* 65 - 99 mg/dL Final   • BUN 03/10/2021 13  8 - 23 mg/dL Final   • Creatinine 03/10/2021 1.48* 0.76 - 1.27 mg/dL Final   • eGFR Non  Am 03/10/2021 46* >60 mL/min/1.73 Final    Comment: The MDRD GFR formula is only valid for adults with stable  renal function between ages 18 and 70.     • eGFR  Am 03/10/2021 55* >60 mL/min/1.73 Final   • BUN/Creatinine Ratio 03/10/2021 8.8  7.0 - 25.0 Final   • Sodium 03/10/2021 142  136 - 145 mmol/L Final   • Potassium 03/10/2021 4.5  3.5 - 5.2 mmol/L Final   • Chloride 03/10/2021 105  98 - 107 mmol/L Final   • Total CO2 03/10/2021 27.6  22.0 - 29.0 mmol/L Final   • Calcium 03/10/2021 9.2  8.6 - 10.5 mg/dL Final   • Total Protein 03/10/2021 6.3  6.0 - 8.5 g/dL Final   • Albumin 03/10/2021 4.10  3.50 - 5.20 g/dL Final   • Globulin 03/10/2021 2.2  gm/dL Final   • A/G Ratio 03/10/2021 1.9  g/dL Final   • Total Bilirubin 03/10/2021 0.5  0.0 - 1.2 mg/dL Final   • Alkaline Phosphatase 03/10/2021 55  39 -  117 U/L Final   • AST (SGOT) 03/10/2021 17  1 - 40 U/L Final   • ALT (SGPT) 03/10/2021 10  1 - 41 U/L Final   • Total Cholesterol 03/10/2021 137  0 - 200 mg/dL Final    Comment: Cholesterol Reference Ranges  (U.S. Department of Health and Human Services ATP III  Classifications)  Desirable          <200 mg/dL  Borderline High    200-239 mg/dL  High Risk          >240 mg/dL  Triglyceride Reference Ranges  (U.S. Department of Health and Human Services ATP III  Classifications)  Normal           <150 mg/dL  Borderline High  150-199 mg/dL  High             200-499 mg/dL  Very High        >500 mg/dL  HDL Reference Ranges  (U.S. Department of Health and Human Services ATP III  Classifcations)  Low     <40 mg/dl (major risk factor for CHD)  High    >60 mg/dl ('negative' risk factor for CHD)  LDL Reference Ranges  (U.S. Department of Health and Human Services ATP III  Classifcations)  Optimal          <100 mg/dL  Near Optimal     100-129 mg/dL  Borderline High  130-159 mg/dL  High             160-189 mg/dL  Very High        >189 mg/dL     • Triglycerides 03/10/2021 60  0 - 150 mg/dL Final   • HDL Cholesterol 03/10/2021 64* 40 - 60 mg/dL Final   • VLDL Cholesterol Tima 03/10/2021 13  5 - 40 mg/dL Final   • LDL Chol Calc (NIH) 03/10/2021 60  0 - 100 mg/dL Final   • TSH 03/10/2021 1.430  0.270 - 4.200 uIU/mL Final   • PSA 03/10/2021 <0.014  0.000 - 4.000 ng/mL Final    Results may be falsely decreased if patient taking Biotin.   • Hemoglobin A1C 03/10/2021 6.30* 4.80 - 5.60 % Final    Comment: Hemoglobin A1C Ranges:  Increased Risk for Diabetes  5.7% to 6.4%  Diabetes                     >= 6.5%  Diabetic Goal                < 7.0%     • Microalbumin, Urine 03/10/2021 10.2  Not Estab. ug/mL Final   • Creatine Kinase 03/10/2021 180  20 - 200 U/L Final      No radiology results for the last 90 days.    Assessment / Plan      1.  Terminal ileitis with aphthous ulcerations   2.    Anastomotic stricture  5/12/2021  He had a  colonoscopy done on 3/29/2021 which again revealed anastomotic stricture at the ileocolonic anastomosis.  I was unable to pass the scope.  Stricture was dilated with the TTS balloon 10-11-12 mm and subsequently scope was passed.  Just proximal to the anastomotic stricture there were a few  aphthous ulcerations.  No endoscopic signs of any colitis noted.  Multiple biopsies obtained from terminal ileum and the colonic segments.  My were normal.  Biopsies were obtained from the cecum ascending colon transverse colon descending colon sigmoid colon and rectum were unremarkable.  No convincing pathological evidence of colitis or ileitis.  Stricture could be from the prior surgical anastomosis rather than Crohn's related. Unclear these few aphthous ulceration proximal to anastomotic stricture is from prolonged stay of stool proximal to the stricture or Crohn's aphthous ulcerations. As pathologically no significant colitis or ileitis noted.  We will simply monitor for now.any medications  Repeat colonoscopy in 2 years time    1/20/2021  Apart from worsening constipation no other new GI symptoms.  Denies any blood in the stool.  No abdominal pain as such except intermittent occasional feeling of indigestion in the epigastric region.  He was treated with a short course of budesonide last year.   We will schedule him for colonoscopy to assess the disease again, if he still has a persistent ulceration stricture, we will treat him as a Crohn's disease of the small bowel.  He did have a TB Gold test, Entamoeba antigen test which were negative.      7/20/2000  Status post short course budesonide 8 weeks.  He is feeling well now without any GI symptoms.  Bowel movements regular  We will follow-up in 6 months time.  We will schedule him for colonoscopy in 1 year time with repeat evaluation and biopsy of the terminal.      4/20/2020  Nature of this terminal ileal ulcerations and mild stricture is unclear.  NSAID induced versus  Crohn's disease.   Patient clearly states that he is not taking any NSAIDs nor he was taking NSAIDs anytime in the recent past.  Biopsies revealed acute inflammation.  Patient did have a similar ulceration 2017.  He did have a ileocolonic resection many years ago for what seemed like a pneumatosis intestinalis as per patient.  Not sure whether this was a Crohn's disease at that time. His a TB Gold test is negative.  His Entamoeba histolytica antigen was negative.  His a CT enterography done recently does not reveal any signs of proximal small bowel disease or strictures. From his persistent ulcerations and some mild stricture noted with endoscopy I will treat him with the budesonide 9 mg p.o. daily for 8 weeks  Follow-up in 3 months time     2/24/2020  Suspected NSAID induced terminal ileal ulcers, mild terminal ileal stricture.  Colonoscopy done on 2/17/2020 revealed terminal ileal ulcer, biopsies consistent with acute mild inflammation, no architectural distortion.  He is not on any NSAIDs currently.   He had a prior ileal cecal anastomosis after small bowel resection many years ago.  His last colonoscopy done in 2017 also showed a similar ulcerations and biopsies showed a acute mild inflammation.  He is asymptomatic except mild constipation.  These findings but not consistent with Crohn's disease however given the chronicity and the mild stricture Crohn's disease is a concern.  Will get the TB Gold test, Giardia and amebiasis antigen and also get a CT enterography to assess for any evidence of proximal Crohn's disease.      3. Chronic idiopathic constipation  Patient is currently taking stool softeners with good a daily bowel movement  We will continue the same    4. Gastroesophageal reflux disease without esophagitis  5/12/2021  EGD done on 3/29/2021 revealed a small hiatal hernia otherwise esophagus was normal.  Mild patchy erythema in the distal stomach noted.  Biopsies from the duodenum stomach and esophagus  were unremarkable.  Will reduce the dose of Prevacid to 15mg po daily         Prior history  5.  Colonic diverticulosis  No signs of any diverticulitis     6.  Internal hemorrhoids  Asymptomatic now        Follow Up:   No follow-ups on file.    Maksim Peguero MD  [facility]  5/12/2021  13:52 EDT     Please note that portions of this note may have been completed with a voice recognition program.

## 2021-05-14 DIAGNOSIS — G62.9 PERIPHERAL POLYNEUROPATHY: ICD-10-CM

## 2021-05-16 RX ORDER — GABAPENTIN 400 MG/1
CAPSULE ORAL
Qty: 270 CAPSULE | Refills: 1 | Status: SHIPPED | OUTPATIENT
Start: 2021-05-16 | End: 2022-02-07

## 2021-05-17 RX ORDER — LANSOPRAZOLE 15 MG/1
CAPSULE, DELAYED RELEASE ORAL
Qty: 90 CAPSULE | Refills: 0 | Status: SHIPPED | OUTPATIENT
Start: 2021-05-17 | End: 2022-02-22

## 2021-05-19 ENCOUNTER — OFFICE VISIT (OUTPATIENT)
Dept: INTERNAL MEDICINE | Facility: CLINIC | Age: 81
End: 2021-05-19

## 2021-05-19 VITALS
WEIGHT: 190.12 LBS | SYSTOLIC BLOOD PRESSURE: 142 MMHG | TEMPERATURE: 97.8 F | HEIGHT: 73 IN | DIASTOLIC BLOOD PRESSURE: 79 MMHG | BODY MASS INDEX: 25.2 KG/M2 | OXYGEN SATURATION: 96 % | HEART RATE: 74 BPM

## 2021-05-19 DIAGNOSIS — H00.025 HORDEOLUM INTERNUM OF LEFT LOWER EYELID: Primary | ICD-10-CM

## 2021-05-19 PROCEDURE — 99213 OFFICE O/P EST LOW 20 MIN: CPT | Performed by: NURSE PRACTITIONER

## 2021-05-19 RX ORDER — ERYTHROMYCIN 5 MG/G
OINTMENT OPHTHALMIC 2 TIMES DAILY
Qty: 1 G | Refills: 0 | Status: SHIPPED | OUTPATIENT
Start: 2021-05-19 | End: 2021-05-26

## 2021-05-19 RX ORDER — TRAMADOL HYDROCHLORIDE 50 MG/1
50 TABLET ORAL 2 TIMES DAILY PRN
COMMUNITY
Start: 2021-05-11 | End: 2021-09-08

## 2021-05-19 NOTE — PROGRESS NOTES
"  Office Visit      Patient Name: Tristan Haines  : 1940   MRN: [id number]   Care Team: Patient Care Team:  Cayden Chiang MD as PCP - General (Internal Medicine)  Vicky Perez MD as Consulting Physician (General Surgery)    Chief Complaint  Left eye stye    Subjective     Subjective      Tristan Haines presents to [facility] for eye problem.  Symptoms started 4 days ago.  Left eye became pruritic and sore near the bottom lid.  Woke up this morning and has a lesion on the inner part of the lower lid.  Was treated for allergic conjunctivitis few weeks ago and the symptoms have improved.  Has not currently tried anything for the symptoms.  Denies discharge, fever, pain, vision changes.    Review of Systems   Constitutional: Negative for chills and fatigue.   HENT: Negative for congestion, rhinorrhea, sneezing and sore throat.    Eyes: Positive for itching. Negative for pain and discharge.   Respiratory: Negative for shortness of breath.    Cardiovascular: Negative for chest pain.   Gastrointestinal: Negative for abdominal pain.   Psychiatric/Behavioral: Negative for sleep disturbance.       Objective     Objective   Vital Signs:   /79   Pulse 74   Temp 97.8 °F (36.6 °C) (Temporal)   Ht 185.4 cm (73\")   Wt 86.2 kg (190 lb 1.9 oz)   SpO2 96%   BMI 25.08 kg/m²     Physical Exam  Vitals and nursing note reviewed.   Constitutional:       General: He is not in acute distress.     Appearance: Normal appearance. He is not toxic-appearing.   Eyes:      General:         Left eye: Hordeolum (Left lower inner lid) present.     Extraocular Movements: Extraocular movements intact.      Conjunctiva/sclera:      Right eye: Right conjunctiva is not injected. No exudate.     Left eye: Left conjunctiva is not injected. No exudate.     Pupils: Pupils are equal, round, and reactive to light.     Neck:      Vascular: No carotid bruit.   Cardiovascular:      Rate and Rhythm: Normal rate and " regular rhythm.      Heart sounds: Normal heart sounds. No murmur heard.     Pulmonary:      Effort: Pulmonary effort is normal. No respiratory distress.      Breath sounds: Normal breath sounds. No wheezing.   Abdominal:      General: Bowel sounds are normal. There is no distension.      Palpations: Abdomen is soft.      Tenderness: There is no abdominal tenderness.   Musculoskeletal:         General: Normal range of motion.      Cervical back: Neck supple. No tenderness.   Skin:     General: Skin is warm and dry.      Findings: No rash.   Neurological:      General: No focal deficit present.      Mental Status: He is alert.   Psychiatric:         Mood and Affect: Mood normal.         Behavior: Behavior normal.       Assessment / Plan      Assessment/Plan   Problem List Items Addressed This Visit     None      Visit Diagnoses     Hordeolum internum of left lower eyelid    -  Primary    Instructed to not touch or scratch the eye.  Wash hands after touching.  Erythromycin ointment twice daily and warm compresses to the site as needed.  RTC if symptoms worsen or fail to improve.  Any vision changes or pain with eye movement report to ER.           Follow Up   Return if symptoms worsen or fail to improve.  Patient was given instructions and counseling regarding his condition or for health maintenance advice. Please see specific information pulled into the AVS if appropriate.     AAKASH Dent  [facility]

## 2021-06-01 NOTE — TELEPHONE ENCOUNTER
Caller: Tristan Haines    Relationship: Self    Best call back number: [phone]    Medication needed:   Requested Prescriptions     Pending Prescriptions Disp Refills   • glucose blood (ONE TOUCH ULTRA TEST) test strip 270 each 3     Sig: use to test blood glucose three times daily for DM E11.9       When do you need the refill by: 06/01/2021    What additional details did the patient provide when requesting the medication: PATIENT STATES THAT HE HAS LESS THAN A THREE DAY SUPPLY. THE PATIENT WOULD LIKE TO KNOW IF THE DOCTOR CAN INCREASE THE NUMBER OF TEST STIRPS HE GET WITH IN 90 DAYS. HE STATES THAT HE IS CURRENTLY TAKING STEROID SHOTS  AND HAS TO TEST MORE THAN HE DID BEFORE     Does the patient have less than a 3 day supply:  [x] Yes  [] No    What is the patient's preferred pharmacy: [id number] - [place], [place] - [address] - [phone]  - [phone] FX

## 2021-08-19 RX ORDER — PEN NEEDLE, DIABETIC 32GX 5/32"
NEEDLE, DISPOSABLE MISCELLANEOUS
Qty: 150 EACH | Refills: 0 | Status: SHIPPED | OUTPATIENT
Start: 2021-08-19 | End: 2021-11-22

## 2021-08-19 NOTE — TELEPHONE ENCOUNTER
Rx Refill Note  Requested Prescriptions     Pending Prescriptions Disp Refills   • glucose blood (ONE TOUCH ULTRA TEST) test strip 270 each 3     Sig: use to test blood glucose three times daily for DM E11.9      Last office visit with prescribing clinician: 4/29/2021      Next office visit with prescribing clinician: 9/8/2021            Saadia Stark LPN  08/19/21, 11:33 EDT

## 2021-09-08 ENCOUNTER — OFFICE VISIT (OUTPATIENT)
Dept: INTERNAL MEDICINE | Facility: CLINIC | Age: 81
End: 2021-09-08

## 2021-09-08 VITALS
HEART RATE: 66 BPM | OXYGEN SATURATION: 94 % | WEIGHT: 190 LBS | HEIGHT: 73 IN | TEMPERATURE: 96.8 F | SYSTOLIC BLOOD PRESSURE: 120 MMHG | BODY MASS INDEX: 25.18 KG/M2 | DIASTOLIC BLOOD PRESSURE: 80 MMHG

## 2021-09-08 DIAGNOSIS — K50.00 TERMINAL ILEITIS WITHOUT COMPLICATION (HCC): ICD-10-CM

## 2021-09-08 DIAGNOSIS — E11.69 TYPE 2 DIABETES MELLITUS WITH OTHER SPECIFIED COMPLICATION, WITHOUT LONG-TERM CURRENT USE OF INSULIN (HCC): ICD-10-CM

## 2021-09-08 DIAGNOSIS — I10 ESSENTIAL HYPERTENSION: Primary | ICD-10-CM

## 2021-09-08 DIAGNOSIS — M41.9 SCOLIOSIS OF THORACOLUMBAR SPINE, UNSPECIFIED SCOLIOSIS TYPE: ICD-10-CM

## 2021-09-08 DIAGNOSIS — E78.5 HYPERLIPIDEMIA, UNSPECIFIED HYPERLIPIDEMIA TYPE: ICD-10-CM

## 2021-09-08 PROBLEM — R07.89 ATYPICAL CHEST PAIN: Status: RESOLVED | Noted: 2021-03-18 | Resolved: 2021-09-08

## 2021-09-08 PROCEDURE — 99214 OFFICE O/P EST MOD 30 MIN: CPT | Performed by: INTERNAL MEDICINE

## 2021-09-08 NOTE — PROGRESS NOTES
Subjective   Tristan Haines is a 81 y.o. male.     Chief Complaint   Patient presents with   • Follow-up   • Hypertension   • Hyperlipidemia   • Diabetes       History of Present Illness   Patient here for follow-up of.  Weight loss on purpose.  Hypertension stable on medication.  Hyperlipidemia stable on medication.  Diabetes is stable on medication.  Back pain stable now.  History of a terminal ileitis stable now.    Current Outpatient Medications:   •  aspirin 81 MG tablet, Take 81 mg by mouth Daily., Disp: , Rfl:   •  atorvastatin (LIPITOR) 40 MG tablet, Take 1 tablet by mouth Daily. 200001, Disp: 90 tablet, Rfl: 3  •  BD Pen Needle Rimma 2nd Gen 32G X 4 MM misc, USE 3 TO 4 PENS DAILY AS DIRECTED, Disp: 150 each, Rfl: 0  •  betamethasone dipropionate (DIPROLENE) 0.05 % cream, APPLY TO AFFECTED AREA ONCE DAILY, Disp: 45 g, Rfl: 1  •  carvedilol (COREG) 25 MG tablet, Take 1 tablet by mouth 2 (Two) Times a Day., Disp: 180 tablet, Rfl: 3  •  Cholecalciferol (VITAMIN D3) 2000 UNITS tablet, Take 1 tablet by mouth Daily., Disp: , Rfl:   •  gabapentin (NEURONTIN) 400 MG capsule, TAKE 1 CAPSULE BY MOUTH THREE TIMES A DAY , Disp: 270 capsule, Rfl: 1  •  glucose blood (ONE TOUCH ULTRA TEST) test strip, use to test blood glucose three times daily for DM E11.9, Disp: 270 each, Rfl: 3  •  insulin aspart (NovoLOG FlexPen) 100 UNIT/ML solution pen-injector sc pen, INJECT 8 UNITS UNDER THE SKIN 3 TIMES DAILY WITH MEALS AS DIRECTED (Patient taking differently: 8 Units. INJECT 8 UNITS UNDER THE SKIN 3 TIMES DAILY WITH MEALS AS DIRECTED), Disp: 15 mL, Rfl: 11  •  Insulin Glargine, 1 Unit Dial, (Toujeo SoloStar) 300 UNIT/ML solution pen-injector injection, 25u bid sc (Patient taking differently: 25 Units. 25u bid sc), Disp: 4.5 mL, Rfl: 11  •  lansoprazole (PREVACID) 15 MG capsule, TAKE 1 CAPSULE BY MOUTH EVERY DAY , Disp: 90 capsule, Rfl: 0  •  losartan (COZAAR) 100 MG tablet, TAKE 1 TABLET BY MOUTH ONE TIME A DAY , Disp: 90 tablet,  Rfl: 1  •  nitroglycerin (Nitrostat) 0.4 MG SL tablet, Place 1 tablet under the tongue Every 5 (Five) Minutes As Needed for Chest Pain. Take no more than 3 doses in 15 minutes., Disp: 50 tablet, Rfl: 12  •  Phenylephrine-Cocoa Butter (PREPARATION H RE), Insert  into the rectum., Disp: , Rfl:   •  polyethylene glycol (MIRALAX) powder, Take 17 g by mouth Daily. (Patient taking differently: Take 17 g by mouth 1 (One) Time.), Disp: 510 g, Rfl: 2  •  Probiotic Product (PROBIOTIC DAILY PO), Take 1 tablet by mouth Daily., Disp: , Rfl:   •  traZODone (DESYREL) 50 MG tablet, Take 1 tablet by mouth every night at bedtime., Disp: 90 tablet, Rfl: 3    The following portions of the patient's history were reviewed and updated as appropriate: allergies, current medications, past family history, past medical history, past social history, past surgical history and problem list.    Review of Systems   Constitutional: Negative.    Respiratory: Negative.    Cardiovascular: Negative.    Gastrointestinal: Negative.    Musculoskeletal: Negative.    Skin: Negative.    Neurological: Negative.    Psychiatric/Behavioral: Negative.        Objective   Physical Exam  HENT:      Head: Normocephalic and atraumatic.   Cardiovascular:      Rate and Rhythm: Normal rate and regular rhythm.      Heart sounds: Normal heart sounds.   Pulmonary:      Effort: Pulmonary effort is normal.      Breath sounds: Normal breath sounds.   Abdominal:      General: Bowel sounds are normal.   Musculoskeletal:      Cervical back: Neck supple.   Skin:     General: Skin is warm.   Neurological:      Mental Status: He is alert and oriented to person, place, and time.         All tests have been reviewed.    Assessment/Plan   Diagnoses and all orders for this visit:    Essential hypertension  -     CBC & Differential    Type 2 diabetes mellitus with other specified complication, without long-term current use of insulin (CMS/[facility])  -     Cancel: CK  -     Cancel:  Comprehensive Metabolic Panel  -     Cancel: Lipid Panel  -     Cancel: Hemoglobin A1c  -     Hemoglobin A1c    Scoliosis of thoracolumbar spine, unspecified scoliosis type    Hyperlipidemia, unspecified hyperlipidemia type  -     CK  -     Comprehensive Metabolic Panel  -     Lipid Panel    Terminal ileitis without complication (CMS/HCC)  -     C-reactive Protein  -     Sedimentation Rate  -     Vitamin B12  -     Folate  -     Iron Profile      6 mo wellness and do labs

## 2021-09-09 RX ORDER — INSULIN GLARGINE 300 U/ML
INJECTION, SOLUTION SUBCUTANEOUS
Qty: 4.5 ML | Refills: 11 | Status: SHIPPED | OUTPATIENT
Start: 2021-09-09

## 2021-09-09 NOTE — TELEPHONE ENCOUNTER
Caller: Tristan Hainse    Relationship: Self    Best call back number:     Medication needed:   Requested Prescriptions     Pending Prescriptions Disp Refills   • Toujeo SoloStar 300 UNIT/ML solution pen-injector injection [Pharmacy Med Name: Toujeo SoloStar Subcutaneous Solution Pen-injector 300 UNIT/ML] 4.5 mL 0     Sig: INJECT 25 UNITS SUBCUTANEOUSLY INTO THE APPROPRIATE AREA TWO TIMES A DAY AS DIRECTED.       When do you need the refill by: ASAP    What additional details did the patient provide when requesting the medication: OUT OF MEDICATION    Does the patient have less than a 3 day supply:  [x] Yes  [] No    What is the patient's preferred pharmacy: [id number] - [place], [place] - [address] - [phone]  - [phone] FX

## 2021-11-12 RX ORDER — ATORVASTATIN CALCIUM 40 MG/1
TABLET, FILM COATED ORAL
Qty: 90 TABLET | Refills: 3 | Status: SHIPPED | OUTPATIENT
Start: 2021-11-12 | End: 2022-11-08

## 2021-11-12 RX ORDER — CARVEDILOL 25 MG/1
TABLET ORAL
Qty: 180 TABLET | Refills: 3 | Status: SHIPPED | OUTPATIENT
Start: 2021-11-12 | End: 2022-11-08

## 2021-11-12 RX ORDER — TRAZODONE HYDROCHLORIDE 50 MG/1
TABLET ORAL
Qty: 90 TABLET | Refills: 3 | Status: SHIPPED | OUTPATIENT
Start: 2021-11-12 | End: 2022-11-08

## 2021-11-12 NOTE — TELEPHONE ENCOUNTER
Rx Refill Note  Requested Prescriptions     Pending Prescriptions Disp Refills   • traZODone (DESYREL) 50 MG tablet [Pharmacy Med Name: traZODone HCl Oral Tablet 50 MG] 90 tablet 3     Sig: TAKE 1 TABLET BY MOUTH AT BEDTIME   • carvedilol (COREG) 25 MG tablet [Pharmacy Med Name: Carvedilol Oral Tablet 25 MG] 180 tablet 3     Sig: TAKE 1 TABLET BY MOUTH TWO TIMES A DAY   • atorvastatin (LIPITOR) 40 MG tablet [Pharmacy Med Name: Atorvastatin Calcium Oral Tablet 40 MG] 90 tablet 3     Sig: TAKE 1 TABLET BY MOUTH EVERY DAY      Last office visit with prescribing clinician: 9/8/2021      Next office visit with prescribing clinician: 3/8/2022            Vesna Steven MA  11/12/21, 08:03 EST

## 2021-11-22 RX ORDER — PEN NEEDLE, DIABETIC 32GX 5/32"
NEEDLE, DISPOSABLE MISCELLANEOUS
Qty: 200 EACH | Refills: 0 | Status: SHIPPED | OUTPATIENT
Start: 2021-11-22 | End: 2022-01-14

## 2021-11-22 NOTE — TELEPHONE ENCOUNTER
Rx Refill Note  Requested Prescriptions     Pending Prescriptions Disp Refills   • BD Pen Needle Rimma 2nd Gen 32G X 4 MM misc [Pharmacy Med Name: BD Pen Needle Rimma 2nd Gen Miscellaneous 32G X 4 MM] 200 each 0     Sig: USE TO INJECT INSULIN 3-4 TIMES DAILY AS DIRECTED      Last office visit with prescribing clinician: 9/8/2021      Next office visit with prescribing clinician: 3/8/2022            Vesna Steven MA  11/22/21, 07:56 EST

## 2022-01-12 ENCOUNTER — TELEMEDICINE (OUTPATIENT)
Dept: INTERNAL MEDICINE | Facility: CLINIC | Age: 82
End: 2022-01-12

## 2022-01-12 ENCOUNTER — HOSPITAL ENCOUNTER (OUTPATIENT)
Dept: GENERAL RADIOLOGY | Facility: HOSPITAL | Age: 82
Discharge: HOME | End: 2022-01-12
Admitting: INTERNAL MEDICINE

## 2022-01-12 DIAGNOSIS — U07.1 COVID-19 VIRUS INFECTION: Primary | ICD-10-CM

## 2022-01-12 PROCEDURE — 71046 X-RAY EXAM CHEST 2 VIEWS: CPT

## 2022-01-12 PROCEDURE — 99213 OFFICE O/P EST LOW 20 MIN: CPT | Performed by: INTERNAL MEDICINE

## 2022-01-12 NOTE — PROGRESS NOTES
Subjective   Tristan Haines is a 82 y.o. male.     Chief Complaint   Patient presents with   • Covid-19 Home Monitoring Video Visit       History of Present Illness   1/2 covid now soa fatigue, occa cough, wheeze, no achy now. Change in taste , smell ok, Mab Iv done , listless, no appetite, occa diarrhea. No fever or chill. Mild running nose,     Current Outpatient Medications:   •  aspirin 81 MG tablet, Take 81 mg by mouth Daily., Disp: , Rfl:   •  atorvastatin (LIPITOR) 40 MG tablet, TAKE 1 TABLET BY MOUTH EVERY DAY, Disp: 90 tablet, Rfl: 3  •  BD Pen Needle Rimma 2nd Gen 32G X 4 MM misc, USE TO INJECT INSULIN 3-4 TIMES DAILY AS DIRECTED, Disp: 200 each, Rfl: 0  •  betamethasone dipropionate (DIPROLENE) 0.05 % cream, APPLY TO AFFECTED AREA ONCE DAILY, Disp: 45 g, Rfl: 1  •  carvedilol (COREG) 25 MG tablet, TAKE 1 TABLET BY MOUTH TWO TIMES A DAY, Disp: 180 tablet, Rfl: 3  •  Cholecalciferol (VITAMIN D3) 2000 UNITS tablet, Take 1 tablet by mouth Daily., Disp: , Rfl:   •  gabapentin (NEURONTIN) 400 MG capsule, TAKE 1 CAPSULE BY MOUTH THREE TIMES A DAY , Disp: 270 capsule, Rfl: 1  •  glucose blood (ONE TOUCH ULTRA TEST) test strip, use to test blood glucose three times daily for DM E11.9, Disp: 270 each, Rfl: 3  •  insulin aspart (NovoLOG FlexPen) 100 UNIT/ML solution pen-injector sc pen, INJECT 8 UNITS UNDER THE SKIN 3 TIMES DAILY WITH MEALS AS DIRECTED (Patient taking differently: 8 Units. INJECT 8 UNITS UNDER THE SKIN 3 TIMES DAILY WITH MEALS AS DIRECTED), Disp: 15 mL, Rfl: 11  •  lansoprazole (PREVACID) 15 MG capsule, TAKE 1 CAPSULE BY MOUTH EVERY DAY , Disp: 90 capsule, Rfl: 0  •  losartan (COZAAR) 100 MG tablet, TAKE 1 TABLET BY MOUTH ONE TIME A DAY , Disp: 90 tablet, Rfl: 1  •  nitroglycerin (Nitrostat) 0.4 MG SL tablet, Place 1 tablet under the tongue Every 5 (Five) Minutes As Needed for Chest Pain. Take no more than 3 doses in 15 minutes., Disp: 50 tablet, Rfl: 12  •  Phenylephrine-Cocoa Butter (PREPARATION H  RE), Insert  into the rectum., Disp: , Rfl:   •  Probiotic Product (PROBIOTIC DAILY PO), Take 1 tablet by mouth Daily., Disp: , Rfl:   •  Toujeo SoloStar 300 UNIT/ML solution pen-injector injection, INJECT 25 UNITS SUBCUTANEOUSLY INTO THE APPROPRIATE AREA TWO TIMES A DAY AS DIRECTED., Disp: 4.5 mL, Rfl: 11  •  traZODone (DESYREL) 50 MG tablet, TAKE 1 TABLET BY MOUTH AT BEDTIME, Disp: 90 tablet, Rfl: 3  •  polyethylene glycol (MIRALAX) powder, Take 17 g by mouth Daily. (Patient taking differently: Take 17 g by mouth 1 (One) Time.), Disp: 510 g, Rfl: 2    The following portions of the patient's history were reviewed and updated as appropriate: allergies, current medications, past family history, past medical history, past social history, past surgical history and problem list.    Review of Systems   Constitutional: Positive for fatigue. Negative for chills and fever.   HENT: Positive for congestion and rhinorrhea. Negative for sore throat.    Respiratory: Positive for cough, shortness of breath and wheezing.    Cardiovascular: Negative.  Negative for chest pain.   Gastrointestinal: Negative.    Musculoskeletal: Negative.  Negative for arthralgias and myalgias.   Skin: Negative.    Neurological: Negative.    Psychiatric/Behavioral: Negative.        Objective   Physical Exam  Pulmonary:      Effort: Pulmonary effort is normal.   Neurological:      Mental Status: He is alert.         All tests have been reviewed.    Assessment/Plan   Diagnoses and all orders for this visit:    COVID-19 virus infection  -     XR Chest PA & Lateral  -     Basic Metabolic Panel  -     CBC & Differential    Tylenol 2 tablet twice a day as needed for now.         You have chosen to receive care through a telehealth visit.  Do you consent to use a video/audio connection for your medical care today? Yes    Individuals involved in this encounter:    Tristan Haines  Za Haines (wife)  LJ Do Dr.

## 2022-01-13 LAB
BASOPHILS # BLD AUTO: 0 X10E3/UL (ref 0–0.2)
BASOPHILS NFR BLD AUTO: 0 %
BUN SERPL-MCNC: 21 MG/DL (ref 8–27)
BUN/CREAT SERPL: 13 (ref 10–24)
CALCIUM SERPL-MCNC: 8.7 MG/DL (ref 8.6–10.2)
CHLORIDE SERPL-SCNC: 98 MMOL/L (ref 96–106)
CO2 SERPL-SCNC: 22 MMOL/L (ref 20–29)
CREAT SERPL-MCNC: 1.6 MG/DL (ref 0.76–1.27)
EOSINOPHIL # BLD AUTO: 0 X10E3/UL (ref 0–0.4)
EOSINOPHIL NFR BLD AUTO: 0 %
ERYTHROCYTE [DISTWIDTH] IN BLOOD BY AUTOMATED COUNT: 13.3 % (ref 11.6–15.4)
GLUCOSE SERPL-MCNC: 123 MG/DL (ref 65–99)
HCT VFR BLD AUTO: 38.5 % (ref 37.5–51)
HGB BLD-MCNC: 13.3 G/DL (ref 13–17.7)
IMM GRANULOCYTES # BLD AUTO: 0 X10E3/UL (ref 0–0.1)
IMM GRANULOCYTES NFR BLD AUTO: 0 %
LYMPHOCYTES # BLD AUTO: 1.3 X10E3/UL (ref 0.7–3.1)
LYMPHOCYTES NFR BLD AUTO: 39 %
MCH RBC QN AUTO: 30.3 PG (ref 26.6–33)
MCHC RBC AUTO-ENTMCNC: 34.5 G/DL (ref 31.5–35.7)
MCV RBC AUTO: 88 FL (ref 79–97)
MONOCYTES # BLD AUTO: 0.4 X10E3/UL (ref 0.1–0.9)
MONOCYTES NFR BLD AUTO: 11 %
NEUTROPHILS # BLD AUTO: 1.7 X10E3/UL (ref 1.4–7)
NEUTROPHILS NFR BLD AUTO: 50 %
PLATELET # BLD AUTO: 119 X10E3/UL (ref 150–450)
POTASSIUM SERPL-SCNC: 4.4 MMOL/L (ref 3.5–5.2)
RBC # BLD AUTO: 4.39 X10E6/UL (ref 4.14–5.8)
SODIUM SERPL-SCNC: 133 MMOL/L (ref 134–144)
WBC # BLD AUTO: 3.4 X10E3/UL (ref 3.4–10.8)

## 2022-01-14 DIAGNOSIS — E11.69 TYPE 2 DIABETES MELLITUS WITH OTHER SPECIFIED COMPLICATION, WITHOUT LONG-TERM CURRENT USE OF INSULIN: Primary | ICD-10-CM

## 2022-01-14 RX ORDER — PEN NEEDLE, DIABETIC 32GX 5/32"
NEEDLE, DISPOSABLE MISCELLANEOUS
Qty: 200 EACH | Refills: 0 | Status: SHIPPED | OUTPATIENT
Start: 2022-01-14 | End: 2022-02-22 | Stop reason: SDUPTHER

## 2022-01-14 NOTE — TELEPHONE ENCOUNTER
Rx Refill Note  Requested Prescriptions     Pending Prescriptions Disp Refills   • BD Pen Needle Rimma 2nd Gen 32G X 4 MM misc [Pharmacy Med Name: BD Pen Needle Rimma 2nd Gen Miscellaneous 32G X 4 MM] 200 each 0     Sig: Use to inject insulin 3 to 4 times a day as directed      Last office visit with prescribing clinician: 9/8/2021      Next office visit with prescribing clinician: 3/8/2022            ROLA ANDINO MA  01/14/22, 09:34 EST

## 2022-02-03 DIAGNOSIS — E11.69 TYPE 2 DIABETES MELLITUS WITH OTHER SPECIFIED COMPLICATION, WITHOUT LONG-TERM CURRENT USE OF INSULIN: ICD-10-CM

## 2022-02-03 RX ORDER — INSULIN ASPART 100 [IU]/ML
INJECTION, SOLUTION INTRAVENOUS; SUBCUTANEOUS
Qty: 15 ML | Refills: 0 | Status: SHIPPED | OUTPATIENT
Start: 2022-02-03 | End: 2022-09-03 | Stop reason: SDUPTHER

## 2022-02-07 DIAGNOSIS — G62.9 PERIPHERAL POLYNEUROPATHY: ICD-10-CM

## 2022-02-07 RX ORDER — GABAPENTIN 400 MG/1
CAPSULE ORAL
Qty: 270 CAPSULE | Refills: 0 | Status: SHIPPED | OUTPATIENT
Start: 2022-02-07 | End: 2022-06-20

## 2022-02-07 NOTE — TELEPHONE ENCOUNTER
Rx Refill Note  Requested Prescriptions     Pending Prescriptions Disp Refills   • gabapentin (NEURONTIN) 400 MG capsule [Pharmacy Med Name: Gabapentin Oral Capsule 400 MG] 270 capsule 0     Sig: TAKE 1 CAPSULE BY MOUTH THREE TIMES A DAY      Last office visit with prescribing clinician: 9/8/2021      Next office visit with prescribing clinician: 3/8/2022            Vesna Steven MA  02/07/22, 13:24 EST

## 2022-02-11 DIAGNOSIS — K21.9 GASTROESOPHAGEAL REFLUX DISEASE WITHOUT ESOPHAGITIS: Primary | ICD-10-CM

## 2022-02-11 RX ORDER — LANSOPRAZOLE 30 MG/1
CAPSULE, DELAYED RELEASE ORAL
Qty: 90 CAPSULE | Refills: 0 | OUTPATIENT
Start: 2022-02-11

## 2022-02-14 ENCOUNTER — TELEPHONE (OUTPATIENT)
Dept: INTERNAL MEDICINE | Facility: CLINIC | Age: 82
End: 2022-02-14

## 2022-02-14 NOTE — TELEPHONE ENCOUNTER
Caller: Tristan Haines    Relationship: Self    Best call back number: [phone]    What medication are you requesting: LANSOPRAZOLE 30 MG, GLUCOSE BLOOD TEST STRIP, BD PEN NEEDLE 32G. MORE QUANTITY     If a prescription is needed, what is your preferred pharmacy and phone number:  [id number], [place] - [address] - [phone] [facility] [phone]         Additional notes: PATIENT STATES THAT SINCE COVID HE HAS HAD MORE PROBLEMS WITH HIS STOMACH, AND KEEPING HIS SUGAR LEVEL UNDER CONTROL. PATIENT IS REQUESTING A NEW PRESCRIPTION ON LANSOPRAZOLE, TEST STRIPS, AND PEN NEEDLES FOR MORE QUANTITY. PATIENT STATES THAT HE HAS TO TAKE MORE TABLETS AND CHECK SUGAR MORE.

## 2022-02-14 NOTE — TELEPHONE ENCOUNTER
Attempted to contact patient; left detailed message per release notifying that appointment was needed for further evaluation before medication could  be changed     HUB may deliver message AND schedule

## 2022-02-15 ENCOUNTER — TELEPHONE (OUTPATIENT)
Dept: INTERNAL MEDICINE | Facility: CLINIC | Age: 82
End: 2022-02-15

## 2022-02-15 NOTE — TELEPHONE ENCOUNTER
Caller: Tristan Haines    Relationship to patient: Self    Best call back number: [phone]   Chief complaint: MEDICATION REVIEW  Type of visit:OFFICE VISIT    Requested date: ASAP    If rescheduling, when is the original appointment: 03/03/2022     Additional notes:PLEASE CALL AND ADVISE THE PATIENT -[phone]

## 2022-02-22 ENCOUNTER — OFFICE VISIT (OUTPATIENT)
Dept: INTERNAL MEDICINE | Facility: CLINIC | Age: 82
End: 2022-02-22

## 2022-02-22 VITALS
BODY MASS INDEX: 24.65 KG/M2 | SYSTOLIC BLOOD PRESSURE: 124 MMHG | DIASTOLIC BLOOD PRESSURE: 80 MMHG | WEIGHT: 186 LBS | OXYGEN SATURATION: 95 % | TEMPERATURE: 97.7 F | HEART RATE: 71 BPM | HEIGHT: 73 IN

## 2022-02-22 DIAGNOSIS — E11.69 TYPE 2 DIABETES MELLITUS WITH OTHER SPECIFIED COMPLICATION, WITHOUT LONG-TERM CURRENT USE OF INSULIN: ICD-10-CM

## 2022-02-22 DIAGNOSIS — K21.9 GASTROESOPHAGEAL REFLUX DISEASE WITHOUT ESOPHAGITIS: ICD-10-CM

## 2022-02-22 DIAGNOSIS — E55.9 VITAMIN D DEFICIENCY: ICD-10-CM

## 2022-02-22 DIAGNOSIS — R53.83 OTHER FATIGUE: ICD-10-CM

## 2022-02-22 DIAGNOSIS — R79.9 ABNORMAL FINDING OF BLOOD CHEMISTRY, UNSPECIFIED: ICD-10-CM

## 2022-02-22 DIAGNOSIS — E78.5 HYPERLIPIDEMIA, UNSPECIFIED HYPERLIPIDEMIA TYPE: ICD-10-CM

## 2022-02-22 DIAGNOSIS — M54.16 LUMBAR RADICULOPATHY: ICD-10-CM

## 2022-02-22 DIAGNOSIS — I10 PRIMARY HYPERTENSION: Primary | ICD-10-CM

## 2022-02-22 PROCEDURE — 99214 OFFICE O/P EST MOD 30 MIN: CPT | Performed by: INTERNAL MEDICINE

## 2022-02-22 RX ORDER — LANSOPRAZOLE 30 MG/1
30 CAPSULE, DELAYED RELEASE ORAL DAILY
Qty: 90 CAPSULE | Refills: 3 | Status: SHIPPED | OUTPATIENT
Start: 2022-02-22 | End: 2023-02-06

## 2022-02-22 RX ORDER — PEN NEEDLE, DIABETIC 32GX 5/32"
NEEDLE, DISPOSABLE MISCELLANEOUS
Qty: 200 EACH | Refills: 6 | Status: SHIPPED | OUTPATIENT
Start: 2022-02-22 | End: 2023-03-24

## 2022-02-22 NOTE — PROGRESS NOTES
Subjective   Tristan Haines is a 82 y.o. male.     Chief Complaint   Patient presents with   • Follow-up     Post COVID    • Diabetes       History of Present Illness   Patient here for follow-up of.  Recent COVID-19.  Complaining of feeling tired also lower back pain gradually getting worse.  No significant radiation pain.  Blood pressure stable now.  Hyperlipidemia stable on medication.  Diabetes of fluctuates.  GERD stable on medication patient still has heartburn from time to time patient is only taking Prevacid 15 mg.    Current Outpatient Medications:   •  aspirin 81 MG tablet, Take 81 mg by mouth Daily., Disp: , Rfl:   •  atorvastatin (LIPITOR) 40 MG tablet, TAKE 1 TABLET BY MOUTH EVERY DAY, Disp: 90 tablet, Rfl: 3  •  betamethasone dipropionate (DIPROLENE) 0.05 % cream, APPLY TO AFFECTED AREA ONCE DAILY, Disp: 45 g, Rfl: 1  •  carvedilol (COREG) 25 MG tablet, TAKE 1 TABLET BY MOUTH TWO TIMES A DAY, Disp: 180 tablet, Rfl: 3  •  Cholecalciferol (VITAMIN D3) 2000 UNITS tablet, Take 1 tablet by mouth Daily., Disp: , Rfl:   •  gabapentin (NEURONTIN) 400 MG capsule, TAKE 1 CAPSULE BY MOUTH THREE TIMES A DAY, Disp: 270 capsule, Rfl: 0  •  glucose blood (ONE TOUCH ULTRA TEST) test strip, use to test blood glucose three times daily for DM E11.9, Disp: 270 each, Rfl: 6  •  insulin aspart (NovoLOG FlexPen) 100 UNIT/ML solution pen-injector sc pen, INJECT 8 UNITS UNDER THE SKIN THREE TIMES DAILY with meals AS DIRECTED, Disp: 15 mL, Rfl: 0  •  Insulin Pen Needle (BD Pen Needle Rimma 2nd Gen) 32G X 4 MM misc, FSBS bid, Disp: 200 each, Rfl: 6  •  losartan (COZAAR) 100 MG tablet, TAKE 1 TABLET BY MOUTH ONE TIME A DAY , Disp: 90 tablet, Rfl: 1  •  nitroglycerin (Nitrostat) 0.4 MG SL tablet, Place 1 tablet under the tongue Every 5 (Five) Minutes As Needed for Chest Pain. Take no more than 3 doses in 15 minutes., Disp: 50 tablet, Rfl: 12  •  Phenylephrine-Cocoa Butter (PREPARATION H RE), Insert  into the rectum., Disp: , Rfl:   •   polyethylene glycol (MIRALAX) powder, Take 17 g by mouth Daily. (Patient taking differently: Take 17 g by mouth 1 (One) Time.), Disp: 510 g, Rfl: 2  •  Probiotic Product (PROBIOTIC DAILY PO), Take 1 tablet by mouth Daily., Disp: , Rfl:   •  Toujeo SoloStar 300 UNIT/ML solution pen-injector injection, INJECT 25 UNITS SUBCUTANEOUSLY INTO THE APPROPRIATE AREA TWO TIMES A DAY AS DIRECTED., Disp: 4.5 mL, Rfl: 11  •  traZODone (DESYREL) 50 MG tablet, TAKE 1 TABLET BY MOUTH AT BEDTIME, Disp: 90 tablet, Rfl: 3  •  lansoprazole (Prevacid) 30 MG capsule, Take 1 capsule by mouth Daily., Disp: 90 capsule, Rfl: 3    The following portions of the patient's history were reviewed and updated as appropriate: allergies, current medications, past family history, past medical history, past social history, past surgical history and problem list.    Review of Systems   Constitutional: Negative.    Respiratory: Negative.    Cardiovascular: Negative.    Gastrointestinal: Negative.    Musculoskeletal: Positive for back pain.   Skin: Negative.    Neurological: Negative.    Psychiatric/Behavioral: Negative.        Objective   Physical Exam  Cardiovascular:      Rate and Rhythm: Normal rate and regular rhythm.      Heart sounds: Normal heart sounds.   Pulmonary:      Effort: Pulmonary effort is normal.      Breath sounds: Normal breath sounds.   Abdominal:      General: Bowel sounds are normal.   Musculoskeletal:         General: Tenderness present.      Cervical back: Neck supple.   Skin:     General: Skin is warm.   Neurological:      Mental Status: He is alert and oriented to person, place, and time.         All tests have been reviewed.    Assessment/Plan   Diagnoses and all orders for this visit:    Primary hypertension cont med  -     CBC & Differential    Hyperlipidemia, unspecified hyperlipidemia type cont med  -     Comprehensive Metabolic Panel  -     Lipid Panel    Type 2 diabetes mellitus with other specified complication, without  long-term current use of insulin (HCC) refill and cont med  -     Hemoglobin A1c  -     Insulin Pen Needle (BD Pen Needle Rimma 2nd Gen) 32G X 4 MM misc; FSBS bid  -     glucose blood (ONE TOUCH ULTRA TEST) test strip; use to test blood glucose three times daily for DM E11.9  -     TSH  -     MicroAlbumin, Urine, Random - Urine, Clean Catch    Lumbar radiculopathy  -     Ambulatory Referral to Physical Therapy Evaluate and treat    Gastroesophageal reflux disease without esophagitis increase prevacid to 30 daily  -     lansoprazole (Prevacid) 30 MG capsule; Take 1 capsule by mouth Daily.    Other fatigue  -     Vitamin B12  -     Iron Profile  -     Folate    Vitamin D deficiency  -     Vitamin D 25 Hydroxy    Abnormal finding of blood chemistry, unspecified   -     Iron Profile      Wellness

## 2022-03-05 LAB
25(OH)D3+25(OH)D2 SERPL-MCNC: 79.1 NG/ML (ref 30–100)
ALBUMIN SERPL-MCNC: 3.9 G/DL (ref 3.5–5.2)
ALBUMIN/GLOB SERPL: 1.8 G/DL
ALP SERPL-CCNC: 55 U/L (ref 39–117)
ALT SERPL-CCNC: 11 U/L (ref 1–41)
AST SERPL-CCNC: 15 U/L (ref 1–40)
BASOPHILS # BLD AUTO: 0.04 10*3/MM3 (ref 0–0.2)
BASOPHILS NFR BLD AUTO: 0.8 % (ref 0–1.5)
BILIRUB SERPL-MCNC: 0.6 MG/DL (ref 0–1.2)
BUN SERPL-MCNC: 14 MG/DL (ref 8–23)
BUN/CREAT SERPL: 8.5 (ref 7–25)
CALCIUM SERPL-MCNC: 9.4 MG/DL (ref 8.6–10.5)
CHLORIDE SERPL-SCNC: 108 MMOL/L (ref 98–107)
CHOLEST SERPL-MCNC: 135 MG/DL (ref 0–200)
CO2 SERPL-SCNC: 27.8 MMOL/L (ref 22–29)
CREAT SERPL-MCNC: 1.64 MG/DL (ref 0.76–1.27)
EGFR GENE MUT ANL BLD/T: 41.5 ML/MIN/1.73
EOSINOPHIL # BLD AUTO: 0.25 10*3/MM3 (ref 0–0.4)
EOSINOPHIL NFR BLD AUTO: 5.1 % (ref 0.3–6.2)
ERYTHROCYTE [DISTWIDTH] IN BLOOD BY AUTOMATED COUNT: 14 % (ref 12.3–15.4)
FOLATE SERPL-MCNC: 7.64 NG/ML (ref 4.78–24.2)
GLOBULIN SER CALC-MCNC: 2.2 GM/DL
GLUCOSE SERPL-MCNC: 123 MG/DL (ref 65–99)
HBA1C MFR BLD: 6.3 % (ref 4.8–5.6)
HCT VFR BLD AUTO: 37.9 % (ref 37.5–51)
HDLC SERPL-MCNC: 58 MG/DL (ref 40–60)
HGB BLD-MCNC: 12.3 G/DL (ref 13–17.7)
IMM GRANULOCYTES # BLD AUTO: 0.01 10*3/MM3 (ref 0–0.05)
IMM GRANULOCYTES NFR BLD AUTO: 0.2 % (ref 0–0.5)
IRON SATN MFR SERPL: 19 % (ref 20–50)
IRON SERPL-MCNC: 66 MCG/DL (ref 59–158)
LDLC SERPL CALC-MCNC: 65 MG/DL (ref 0–100)
LYMPHOCYTES # BLD AUTO: 1.87 10*3/MM3 (ref 0.7–3.1)
LYMPHOCYTES NFR BLD AUTO: 38.2 % (ref 19.6–45.3)
MCH RBC QN AUTO: 30.1 PG (ref 26.6–33)
MCHC RBC AUTO-ENTMCNC: 32.5 G/DL (ref 31.5–35.7)
MCV RBC AUTO: 92.9 FL (ref 79–97)
MICROALBUMIN UR-MCNC: <3 UG/ML
MONOCYTES # BLD AUTO: 0.37 10*3/MM3 (ref 0.1–0.9)
MONOCYTES NFR BLD AUTO: 7.6 % (ref 5–12)
NEUTROPHILS # BLD AUTO: 2.36 10*3/MM3 (ref 1.7–7)
NEUTROPHILS NFR BLD AUTO: 48.1 % (ref 42.7–76)
PLATELET # BLD AUTO: 131 10*3/MM3 (ref 140–450)
POTASSIUM SERPL-SCNC: 4.9 MMOL/L (ref 3.5–5.2)
PROT SERPL-MCNC: 6.1 G/DL (ref 6–8.5)
RBC # BLD AUTO: 4.08 10*6/MM3 (ref 4.14–5.8)
SODIUM SERPL-SCNC: 144 MMOL/L (ref 136–145)
TIBC SERPL-MCNC: 341 MCG/DL
TRIGL SERPL-MCNC: 57 MG/DL (ref 0–150)
TSH SERPL DL<=0.005 MIU/L-ACNC: 1.7 UIU/ML (ref 0.27–4.2)
UIBC SERPL-MCNC: 275 MCG/DL (ref 112–346)
VIT B12 SERPL-MCNC: 293 PG/ML (ref 211–946)
VLDLC SERPL CALC-MCNC: 12 MG/DL (ref 5–40)
WBC # BLD AUTO: 4.9 10*3/MM3 (ref 3.4–10.8)

## 2022-03-08 ENCOUNTER — OFFICE VISIT (OUTPATIENT)
Dept: INTERNAL MEDICINE | Facility: CLINIC | Age: 82
End: 2022-03-08

## 2022-03-08 VITALS
DIASTOLIC BLOOD PRESSURE: 84 MMHG | TEMPERATURE: 97.5 F | HEIGHT: 73 IN | SYSTOLIC BLOOD PRESSURE: 132 MMHG | WEIGHT: 190 LBS | HEART RATE: 67 BPM | BODY MASS INDEX: 25.18 KG/M2 | OXYGEN SATURATION: 98 %

## 2022-03-08 DIAGNOSIS — C61 MALIGNANT NEOPLASM OF PROSTATE: ICD-10-CM

## 2022-03-08 DIAGNOSIS — N43.3 CHRONIC HYDROCELE: Chronic | ICD-10-CM

## 2022-03-08 DIAGNOSIS — E55.9 VITAMIN D DEFICIENCY: ICD-10-CM

## 2022-03-08 DIAGNOSIS — K76.0 FATTY LIVER: ICD-10-CM

## 2022-03-08 DIAGNOSIS — G47.00 INSOMNIA, UNSPECIFIED TYPE: Chronic | ICD-10-CM

## 2022-03-08 DIAGNOSIS — K21.9 GASTROESOPHAGEAL REFLUX DISEASE WITHOUT ESOPHAGITIS: ICD-10-CM

## 2022-03-08 DIAGNOSIS — I10 PRIMARY HYPERTENSION: Primary | ICD-10-CM

## 2022-03-08 DIAGNOSIS — D64.9 ANEMIA, UNSPECIFIED TYPE: ICD-10-CM

## 2022-03-08 DIAGNOSIS — K40.90 INGUINAL HERNIA WITHOUT OBSTRUCTION OR GANGRENE, RECURRENCE NOT SPECIFIED, UNSPECIFIED LATERALITY: Chronic | ICD-10-CM

## 2022-03-08 DIAGNOSIS — E78.5 HYPERLIPIDEMIA, UNSPECIFIED HYPERLIPIDEMIA TYPE: ICD-10-CM

## 2022-03-08 DIAGNOSIS — N18.9 CHRONIC KIDNEY DISEASE, UNSPECIFIED CKD STAGE: ICD-10-CM

## 2022-03-08 DIAGNOSIS — N20.0 CALCULUS OF KIDNEY: ICD-10-CM

## 2022-03-08 DIAGNOSIS — R32 URINARY INCONTINENCE, UNSPECIFIED TYPE: ICD-10-CM

## 2022-03-08 DIAGNOSIS — E11.69 TYPE 2 DIABETES MELLITUS WITH OTHER SPECIFIED COMPLICATION, WITHOUT LONG-TERM CURRENT USE OF INSULIN: ICD-10-CM

## 2022-03-08 DIAGNOSIS — I25.119 CORONARY ARTERY DISEASE INVOLVING NATIVE CORONARY ARTERY OF NATIVE HEART WITH ANGINA PECTORIS: Chronic | ICD-10-CM

## 2022-03-08 DIAGNOSIS — G62.9 PERIPHERAL POLYNEUROPATHY: ICD-10-CM

## 2022-03-08 DIAGNOSIS — Z95.1 HX OF CABG: ICD-10-CM

## 2022-03-08 DIAGNOSIS — H91.93 HEARING PROBLEM OF BOTH EARS: Chronic | ICD-10-CM

## 2022-03-08 DIAGNOSIS — R60.0 EDEMA OF LOWER EXTREMITY: ICD-10-CM

## 2022-03-08 DIAGNOSIS — M41.9 SCOLIOSIS OF THORACOLUMBAR SPINE, UNSPECIFIED SCOLIOSIS TYPE: ICD-10-CM

## 2022-03-08 DIAGNOSIS — M54.16 LUMBAR RADICULOPATHY: ICD-10-CM

## 2022-03-08 PROCEDURE — 99397 PER PM REEVAL EST PAT 65+ YR: CPT | Performed by: INTERNAL MEDICINE

## 2022-03-08 PROCEDURE — G0439 PPPS, SUBSEQ VISIT: HCPCS | Performed by: INTERNAL MEDICINE

## 2022-03-08 PROCEDURE — 1159F MED LIST DOCD IN RCRD: CPT | Performed by: INTERNAL MEDICINE

## 2022-03-08 PROCEDURE — 1170F FXNL STATUS ASSESSED: CPT | Performed by: INTERNAL MEDICINE

## 2022-03-08 NOTE — PROGRESS NOTES
The ABCs of the Annual Wellness Visit  Subsequent Medicare Wellness Visit    Chief Complaint   Patient presents with   • Medicare Wellness-subsequent      Subjective    History of Present Illness:  Tristan Haines is a 82 y.o. male who presents for a Subsequent Medicare Wellness Visit.    The following portions of the patient's history were reviewed and   updated as appropriate: allergies, current medications, past family history, past medical history, past social history, past surgical history and problem list.    Compared to one year ago, the patient feels his physical   health is the same.    Compared to one year ago, the patient feels his mental   health is the same.    Recent Hospitalizations:  He was not admitted to the hospital during the last year.       Current Medical Providers:  Patient Care Team:  Cayden Chiang MD as PCP - General (Internal Medicine)  Vicky Perez MD as Consulting Physician (General Surgery)    Outpatient Medications Prior to Visit   Medication Sig Dispense Refill   • aspirin 81 MG tablet Take 81 mg by mouth Daily.     • atorvastatin (LIPITOR) 40 MG tablet TAKE 1 TABLET BY MOUTH EVERY DAY 90 tablet 3   • betamethasone dipropionate (DIPROLENE) 0.05 % cream APPLY TO AFFECTED AREA ONCE DAILY 45 g 1   • carvedilol (COREG) 25 MG tablet TAKE 1 TABLET BY MOUTH TWO TIMES A  tablet 3   • Cholecalciferol (VITAMIN D3) 2000 UNITS tablet Take 1 tablet by mouth Daily.     • gabapentin (NEURONTIN) 400 MG capsule TAKE 1 CAPSULE BY MOUTH THREE TIMES A  capsule 0   • glucose blood (ONE TOUCH ULTRA TEST) test strip use to test blood glucose three times daily for DM E11.9 270 each 6   • insulin aspart (NovoLOG FlexPen) 100 UNIT/ML solution pen-injector sc pen INJECT 8 UNITS UNDER THE SKIN THREE TIMES DAILY with meals AS DIRECTED 15 mL 0   • Insulin Pen Needle (BD Pen Needle Rimma 2nd Gen) 32G X 4 MM misc FSBS bid 200 each 6   • lansoprazole (Prevacid) 30 MG capsule Take 1 capsule by mouth  Daily. 90 capsule 3   • losartan (COZAAR) 100 MG tablet TAKE 1 TABLET BY MOUTH ONE TIME A DAY  90 tablet 1   • nitroglycerin (Nitrostat) 0.4 MG SL tablet Place 1 tablet under the tongue Every 5 (Five) Minutes As Needed for Chest Pain. Take no more than 3 doses in 15 minutes. 50 tablet 12   • Phenylephrine-Cocoa Butter (PREPARATION H RE) Insert  into the rectum.     • polyethylene glycol (MIRALAX) powder Take 17 g by mouth Daily. (Patient taking differently: Take 17 g by mouth 1 (One) Time.) 510 g 2   • Probiotic Product (PROBIOTIC DAILY PO) Take 1 tablet by mouth Daily.     • Toujeo SoloStar 300 UNIT/ML solution pen-injector injection INJECT 25 UNITS SUBCUTANEOUSLY INTO THE APPROPRIATE AREA TWO TIMES A DAY AS DIRECTED. 4.5 mL 11   • traZODone (DESYREL) 50 MG tablet TAKE 1 TABLET BY MOUTH AT BEDTIME 90 tablet 3     No facility-administered medications prior to visit.       No opioid medication identified on active medication list. I have reviewed chart for other potential  high risk medication/s and harmful drug interactions in the elderly.          Aspirin is on active medication list. Aspirin use is indicated based on review of current medical condition/s. Pros and cons of this therapy have been discussed today. Benefits of this medication outweigh potential harm.  Patient has been encouraged to continue taking this medication.  .      Patient Active Problem List   Diagnosis   • Colon polyp   • Diabetes mellitus (HCC)   • Edema of lower extremity   • Gastroesophageal reflux disease   • Hyperlipidemia   • Hypertension   • Insomnia   • Lumbar radiculopathy   • Neck pain   • Calculus of kidney   • Peripheral polyneuropathy   • Malignant neoplasm of prostate (HCC)   • Inflammation of sacroiliac joint (HCC)   • Vitamin D deficiency   • Hearing problem of both ears   • CAD (coronary artery disease)   • Inguinal hernia   • Chronic hydrocele   • Scoliosis of thoracolumbar spine (S/P Surgery w/ hardware, S/P Epidural pain  "pump)   • Fatty liver   • Skin cancer   • Urinary incontinence   • Hx of CABG   • Terminal ileitis without complication (HCC)     Advance Care Planning  Advance Directive is not on file.  ACP discussion was held with the patient during this visit. Patient does not have an advance directive, information provided.    Review of Systems   Constitutional: Negative.    HENT: Negative.    Eyes: Negative.    Respiratory: Negative.    Cardiovascular: Negative.    Gastrointestinal: Negative.    Endocrine: Negative.    Genitourinary: Negative.    Musculoskeletal: Negative.    Skin: Negative.    Allergic/Immunologic: Negative.    Neurological: Negative.    Hematological: Negative.    Psychiatric/Behavioral: Negative.         Objective    Vitals:    03/08/22 1354   BP: 132/84   Pulse: 67   Temp: 97.5 °F (36.4 °C)   SpO2: 98%   Weight: 86.2 kg (190 lb)   Height: 185.4 cm (73\")     BMI Readings from Last 1 Encounters:   03/08/22 25.07 kg/m²   BMI is within normal parameters. No follow-up required.    Does the patient have evidence of cognitive impairment? No    Physical Exam  Vitals and nursing note reviewed.   Constitutional:       Appearance: Normal appearance. He is well-developed and normal weight.   HENT:      Head: Normocephalic and atraumatic.      Right Ear: Tympanic membrane, ear canal and external ear normal.      Left Ear: Tympanic membrane, ear canal and external ear normal.      Nose: Nose normal.      Mouth/Throat:      Mouth: Mucous membranes are moist.      Pharynx: Oropharynx is clear.   Eyes:      Conjunctiva/sclera: Conjunctivae normal.      Pupils: Pupils are equal, round, and reactive to light.   Neck:      Thyroid: No thyromegaly.   Cardiovascular:      Rate and Rhythm: Normal rate and regular rhythm.      Heart sounds: Normal heart sounds.   Pulmonary:      Effort: Pulmonary effort is normal.      Breath sounds: Normal breath sounds.   Abdominal:      General: Abdomen is flat. Bowel sounds are normal.      " Palpations: Abdomen is soft.   Musculoskeletal:         General: Normal range of motion.      Cervical back: Normal range of motion and neck supple.   Skin:     General: Skin is warm and dry.   Neurological:      Mental Status: He is alert and oriented to person, place, and time.      Deep Tendon Reflexes: Reflexes are normal and symmetric.   Psychiatric:         Behavior: Behavior normal.         Thought Content: Thought content normal.         Judgment: Judgment normal.       Lab Results   Component Value Date    CHLPL 135 03/04/2022    TRIG 57 03/04/2022    HDL 58 03/04/2022    LDL 65 03/04/2022    VLDL 12 03/04/2022    HGBA1C 6.30 (H) 03/04/2022            HEALTH RISK ASSESSMENT    Smoking Status:  Social History     Tobacco Use   Smoking Status Never Smoker   Smokeless Tobacco Never Used     Alcohol Consumption:  Social History     Substance and Sexual Activity   Alcohol Use Not Currently    Comment: Hx wine use in past     Fall Risk Screen:    STEADI Fall Risk Assessment was completed, and patient is at MODERATE risk for falls. Assessment completed on:3/8/2022    Depression Screening:  PHQ-2/PHQ-9 Depression Screening 3/8/2022   Little Interest or Pleasure in Doing Things 0-->not at all   Feeling Down, Depressed or Hopeless 0-->not at all   PHQ-9: Brief Depression Severity Measure Score 0       Health Habits and Functional and Cognitive Screening:  Functional & Cognitive Status 3/8/2022   Do you have difficulty preparing food and eating? No   Do you have difficulty bathing yourself, getting dressed or grooming yourself? No   Do you have difficulty using the toilet? No   Do you have difficulty moving around from place to place? No   Do you have trouble with steps or getting out of a bed or a chair? No   Current Diet Well Balanced Diet   Dental Exam Up to date        Dental Exam Comment 2021   Eye Exam Up to date        Eye Exam Comment 12/2021   Exercise (times per week) 0 times per week   Current Exercises  Include No Regular Exercise   Current Exercise Activities Include -   Do you need help using the phone?  No   Are you deaf or do you have serious difficulty hearing?  Yes   Do you need help with transportation? No   Do you need help shopping? No   Do you need help preparing meals?  No   Do you need help with housework?  No   Do you need help with laundry? No   Do you need help taking your medications? No   Do you need help managing money? No   Do you ever drive or ride in a car without wearing a seat belt? No   Have you felt unusual stress, anger or loneliness in the last month? No   Who do you live with? Spouse   If you need help, do you have trouble finding someone available to you? No   Have you been bothered in the last four weeks by sexual problems? No   Do you have difficulty concentrating, remembering or making decisions? No       Age-appropriate Screening Schedule:  Refer to the list below for future screening recommendations based on patient's age, sex and/or medical conditions. Orders for these recommended tests are listed in the plan section. The patient has been provided with a written plan.    Health Maintenance   Topic Date Due   • TDAP/TD VACCINES (1 - Tdap) Never done   • ZOSTER VACCINE (2 of 2) 11/10/2015   • INFLUENZA VACCINE  03/31/2022 (Originally 8/1/2021)   • DIABETIC EYE EXAM  04/06/2022   • HEMOGLOBIN A1C  09/04/2022   • LIPID PANEL  03/04/2023   • URINE MICROALBUMIN  03/04/2023              Assessment/Plan   CMS Preventative Services Quick Reference  Risk Factors Identified During Encounter  Hearing Problem  The above risks/problems have been discussed with the patient.  Follow up actions/plans if indicated are seen below in the Assessment/Plan Section.  Pertinent information has been shared with the patient in the After Visit Summary.    Diagnoses and all orders for this visit:    1. Primary hypertension (Primary) cont med    2. Hyperlipidemia, cont med    3. Hx of CABG    4. Coronary  artery disease involving native coronary artery of native heart with angina pectoris (HCC) cont med    5. Hearing problem of both ears    6. Type 2 diabetes mellitus with other specified complication, without long-term current use of insulin (HCC) cont med    7. Vitamin D deficiency decrease supplement     8. Inguinal hernia without obstruction or gangrene, recurrence not specified, unspecified laterality    9. Gastroesophageal reflux disease without esophagitis cont med    10. Fatty liver diet     11. Urinary incontinence, unspecified type    12. Chronic hydrocele    13. Calculus of kidney    14. Malignant neoplasm of prostate (HCC)    15. Scoliosis of thoracolumbar spine, unspecified scoliosis type    16. Peripheral polyneuropathy cont med    17. Lumbar radiculopathy follow pain clinic for epidu     18. Insomnia, unspecified type    19. Edema of lower extremity    20. Anemia, unspecified type start iron and b12 repeat 3 mo   -     CBC & Differential  -     Ferritin  -     Iron Profile  -     Vitamin B12  -     Folate  -     Haptoglobin  -     Reticulocytes    start iron and B12     Follow Up:   No follow-ups on file.   3 mo after labs  An After Visit Summary and PPPS were made available to the patient.              ----Below is to historical records for reference only:   Edema of lower extremities arcelia compression hose, 1+ now  Skin cancer follow-up with dermatologist  Memory loss Mini-Mental status examination showed a score of 29.  Continue to watch  Right 4th finger distal lig tear seen by hand surgeon no surgical indication.    Radiculopathy, cervical  S/p shots and doing better,   Constipation , fiber probiotic, no diarrhea  lumbar disc surgery, 2004 L4-5.  LBP lumbar radiculopathy. continue Flexeril Mobic, MRI L2/3 mod to severe stenosis. continue follow-up with chiropractor, s/p ablation, follow up with pain clinic  tramadol  neuropathy,neurotin 400mg bid continue unable to tolerate lyrica  sacroiliac  inflammation continue chiro. tylenol arthritis.  edema resolved after d/c amlodipine   aldactone caused high potassium.  Anemia resolved. Due to gastritis per dr solorio continue PPI .  Bright red blood per rectum Hemoccult positive m. w/u lab done.  low ferritin sbft if still anemia. s/p colonoscopy3/2021 repeat 3 years and s/p upper endoscopy 3/2021   Fundoplication was done 1988, record reviewed  diarrhea, metformin caused, resolved after d/c metformin  GERD hx of  surgery continue medicine  s/p EGD  Peripheral neuropathy continue medications  tinnitus, loss of hearing  vision change see eye doctor  leg cramps skin lesions,   hydrocele seen by uro  sleep disturbances, continue trazodone  Td to 2010?, prevnar 13 done, zostavax done, refuse flushot. pneumovax done, shingrix informed  prostate ca s/p removal PSA=0.00, now urine hesitation seen by uro.   Urine incont. Cont merbetrq and follow uro  Sleep apnea, sleep study negative for sleep apnea  Constipation, fiber and water, metamucil improved      CT:gall stone, hydrocele and divertic       CKD watch       Td to HD, patient refuses flu      Gynecomastia and mamm and us done , prolactin slight high, other hormones normal. Watch for now--

## 2022-05-11 NOTE — TELEPHONE ENCOUNTER
Caller: HainesHuseyinry    Relationship: Self    Best call back number: [phone]    Medication needed:   Requested Prescriptions     Pending Prescriptions Disp Refills   • glucose blood (ONE TOUCH ULTRA TEST) test strip 270 each 3     Sig: use to test blood glucose three times daily for DM E11.9       What additional details did the patient provide when requesting the medication: PATIENT OUT OF TEST STRIPS    Does the patient have less than a 3 day supply:  [x] Yes  [] No    What is the patient's preferred pharmacy: [id number] - [place], [place] - [address] - [phone] [facility] [phone] FX        Other Specify

## 2022-05-18 ENCOUNTER — TRANSCRIBE ORDERS (OUTPATIENT)
Dept: ADMINISTRATIVE | Facility: HOSPITAL | Age: 82
End: 2022-05-18

## 2022-05-18 DIAGNOSIS — N18.32 STAGE 3B CHRONIC KIDNEY DISEASE: Primary | ICD-10-CM

## 2022-06-07 ENCOUNTER — HOSPITAL ENCOUNTER (OUTPATIENT)
Dept: ULTRASOUND IMAGING | Facility: HOSPITAL | Age: 82
Discharge: HOME | End: 2022-06-07
Admitting: INTERNAL MEDICINE

## 2022-06-07 DIAGNOSIS — N18.32 STAGE 3B CHRONIC KIDNEY DISEASE: ICD-10-CM

## 2022-06-07 PROCEDURE — 76775 US EXAM ABDO BACK WALL LIM: CPT

## 2022-06-08 ENCOUNTER — OFFICE VISIT (OUTPATIENT)
Dept: INTERNAL MEDICINE | Facility: CLINIC | Age: 82
End: 2022-06-08

## 2022-06-08 VITALS
OXYGEN SATURATION: 96 % | DIASTOLIC BLOOD PRESSURE: 66 MMHG | WEIGHT: 184 LBS | SYSTOLIC BLOOD PRESSURE: 128 MMHG | HEIGHT: 73 IN | TEMPERATURE: 97.3 F | BODY MASS INDEX: 24.39 KG/M2 | HEART RATE: 57 BPM

## 2022-06-08 DIAGNOSIS — M54.16 LUMBAR RADICULOPATHY: ICD-10-CM

## 2022-06-08 DIAGNOSIS — E11.69 TYPE 2 DIABETES MELLITUS WITH OTHER SPECIFIED COMPLICATION, WITHOUT LONG-TERM CURRENT USE OF INSULIN: ICD-10-CM

## 2022-06-08 DIAGNOSIS — D75.839 THROMBOCYTOSIS: ICD-10-CM

## 2022-06-08 DIAGNOSIS — E55.9 VITAMIN D DEFICIENCY: ICD-10-CM

## 2022-06-08 DIAGNOSIS — I10 PRIMARY HYPERTENSION: Primary | ICD-10-CM

## 2022-06-08 DIAGNOSIS — I25.119 CORONARY ARTERY DISEASE INVOLVING NATIVE CORONARY ARTERY OF NATIVE HEART WITH ANGINA PECTORIS: Chronic | ICD-10-CM

## 2022-06-08 DIAGNOSIS — N18.4 STAGE 4 CHRONIC KIDNEY DISEASE: ICD-10-CM

## 2022-06-08 PROCEDURE — 99214 OFFICE O/P EST MOD 30 MIN: CPT | Performed by: INTERNAL MEDICINE

## 2022-06-08 NOTE — PROGRESS NOTES
Subjective   Tristan Haines is a 82 y.o. male.     Chief Complaint   Patient presents with   • Follow-up   • Diabetes   • Hypertension       History of Present Illness   Patient here for follow-up. CAD stable normal chest pain. Blood pressure stable medication. Diabetes stable medication. Lumbar radiculopathy status post ablation doing better. Creatinine elevated platelets decreased. Vitamin D stable supplement.    Current Outpatient Medications:   •  aspirin 81 MG tablet, Take 81 mg by mouth Daily., Disp: , Rfl:   •  atorvastatin (LIPITOR) 40 MG tablet, TAKE 1 TABLET BY MOUTH EVERY DAY, Disp: 90 tablet, Rfl: 3  •  betamethasone dipropionate (DIPROLENE) 0.05 % cream, APPLY TO AFFECTED AREA ONCE DAILY, Disp: 45 g, Rfl: 1  •  carvedilol (COREG) 25 MG tablet, TAKE 1 TABLET BY MOUTH TWO TIMES A DAY, Disp: 180 tablet, Rfl: 3  •  Cholecalciferol (VITAMIN D3) 2000 UNITS tablet, Take 1 tablet by mouth Daily., Disp: , Rfl:   •  gabapentin (NEURONTIN) 400 MG capsule, TAKE 1 CAPSULE BY MOUTH THREE TIMES A DAY, Disp: 270 capsule, Rfl: 0  •  glucose blood (ONE TOUCH ULTRA TEST) test strip, use to test blood glucose three times daily for DM E11.9, Disp: 270 each, Rfl: 6  •  insulin aspart (NovoLOG FlexPen) 100 UNIT/ML solution pen-injector sc pen, INJECT 8 UNITS UNDER THE SKIN THREE TIMES DAILY with meals AS DIRECTED, Disp: 15 mL, Rfl: 0  •  Insulin Pen Needle (BD Pen Needle Rimma 2nd Gen) 32G X 4 MM misc, FSBS bid, Disp: 200 each, Rfl: 6  •  lansoprazole (Prevacid) 30 MG capsule, Take 1 capsule by mouth Daily., Disp: 90 capsule, Rfl: 3  •  losartan (COZAAR) 100 MG tablet, TAKE 1 TABLET BY MOUTH ONE TIME A DAY , Disp: 90 tablet, Rfl: 1  •  nitroglycerin (Nitrostat) 0.4 MG SL tablet, Place 1 tablet under the tongue Every 5 (Five) Minutes As Needed for Chest Pain. Take no more than 3 doses in 15 minutes., Disp: 50 tablet, Rfl: 12  •  Phenylephrine-Cocoa Butter (PREPARATION H RE), Insert  into the rectum., Disp: , Rfl:   •  Probiotic  Product (PROBIOTIC DAILY PO), Take 1 tablet by mouth Daily., Disp: , Rfl:   •  Toujeo SoloStar 300 UNIT/ML solution pen-injector injection, INJECT 25 UNITS SUBCUTANEOUSLY INTO THE APPROPRIATE AREA TWO TIMES A DAY AS DIRECTED., Disp: 4.5 mL, Rfl: 11  •  traZODone (DESYREL) 50 MG tablet, TAKE 1 TABLET BY MOUTH AT BEDTIME, Disp: 90 tablet, Rfl: 3  •  polyethylene glycol (MIRALAX) powder, Take 17 g by mouth Daily. (Patient taking differently: Take 17 g by mouth 1 (One) Time.), Disp: 510 g, Rfl: 2    The following portions of the patient's history were reviewed and updated as appropriate: allergies, current medications, past family history, past medical history, past social history, past surgical history and problem list.    Review of Systems   Constitutional: Negative.    Respiratory: Negative.    Cardiovascular: Negative.    Gastrointestinal: Negative.    Musculoskeletal: Negative.    Skin: Negative.    Neurological: Negative.    Psychiatric/Behavioral: Negative.        Objective   Physical Exam  Cardiovascular:      Rate and Rhythm: Normal rate and regular rhythm.      Heart sounds: Normal heart sounds.   Pulmonary:      Effort: Pulmonary effort is normal.      Breath sounds: Normal breath sounds.   Abdominal:      General: Bowel sounds are normal.   Musculoskeletal:      Cervical back: Neck supple.   Skin:     General: Skin is warm.   Neurological:      Mental Status: He is alert and oriented to person, place, and time.         All tests have been reviewed.    Assessment & Plan   Diagnoses and all orders for this visit:    Primary hypertension stable on  losartan 100mg daily po cont    Coronary artery disease involving native coronary artery of native heart with angina pectoris ([facility]) follow up with cardio    Type 2 diabetes mellitus with other specified complication, without long-term current use of insulin ([facility]), stable on insulin    Vitamin D deficiency cont vit D     Lumbar radiculopathy s/p ablation doing well      Thrombocytosis cont to watch     Stage 4 chronic kidney disease (HCC) follow renal

## 2022-06-16 RX ORDER — BETAMETHASONE DIPROPIONATE 0.5 MG/G
CREAM TOPICAL 2 TIMES DAILY
Qty: 45 G | Refills: 1 | Status: SHIPPED | OUTPATIENT
Start: 2022-06-16 | End: 2022-08-10

## 2022-06-16 NOTE — TELEPHONE ENCOUNTER
Rx Refill Note  Requested Prescriptions     Pending Prescriptions Disp Refills   • betamethasone dipropionate 0.05 % cream 45 g 1     Sig: Apply  topically to the appropriate area as directed 2 (Two) Times a Day.      Last office visit with prescribing clinician: 6/8/2022      Next office visit with prescribing clinician: 12/8/2022            Vesna Steven MA  06/16/22, 14:53 EDT

## 2022-06-16 NOTE — TELEPHONE ENCOUNTER
Caller: Tristan Haines    Relationship: Self    Best call back number: [phone]    Requested Prescriptions:   Requested Prescriptions     Pending Prescriptions Disp Refills   • betamethasone dipropionate 0.05 % cream 45 g 1        Pharmacy where request should be sent: [id number] - [place], [place] - [address]  - [phone]  - [phone] FX     Additional details provided by patient: PATIENT WANTED 2 TUBES OF MEDICATION   PLEASE ADVISE    Does the patient have less than a 3 day supply:  [x] Yes  [] No    Mariely Reyez Rep   06/16/22 12:21 EDT

## 2022-06-20 DIAGNOSIS — G62.9 PERIPHERAL POLYNEUROPATHY: ICD-10-CM

## 2022-06-20 RX ORDER — GABAPENTIN 400 MG/1
CAPSULE ORAL
Qty: 270 CAPSULE | Refills: 2 | Status: SHIPPED | OUTPATIENT
Start: 2022-06-20 | End: 2023-03-20

## 2022-06-20 NOTE — TELEPHONE ENCOUNTER
Rx Refill Note  Requested Prescriptions     Pending Prescriptions Disp Refills   • gabapentin (NEURONTIN) 400 MG capsule [Pharmacy Med Name: Gabapentin Oral Capsule 400 MG] 270 capsule 0     Sig: TAKE 1 CAPSULE BY MOUTH THREE TIMES A DAY      Last office visit with prescribing clinician: 6/8/2022      Next office visit with prescribing clinician: 12/8/2022            Vesna Steven MA  06/20/22, 10:55 EDT

## 2022-06-20 NOTE — TELEPHONE ENCOUNTER
Caller: Tristan Haines    Relationship: Self    Best call back number:[phone]    Requested Prescriptions:   Requested Prescriptions     Pending Prescriptions Disp Refills   • gabapentin (NEURONTIN) 400 MG capsule [Pharmacy Med Name: Gabapentin Oral Capsule 400 MG] 270 capsule 0     Sig: TAKE 1 CAPSULE BY MOUTH THREE TIMES A DAY        Pharmacy where request should be sent: [id number], [place] - [address] - [phone]  - [phone] FX     Additional details provided by patient:     Does the patient have less than a 3 day supply:  [x] Yes  [] No    Mariely Quinonez Rep   06/20/22 09:40 EDT

## 2022-06-20 NOTE — TELEPHONE ENCOUNTER
Rx Refill Note  Requested Prescriptions     Pending Prescriptions Disp Refills   • gabapentin (NEURONTIN) 400 MG capsule [Pharmacy Med Name: Gabapentin Oral Capsule 400 MG] 270 capsule 0     Sig: TAKE 1 CAPSULE BY MOUTH THREE TIMES A DAY      Last office visit with prescribing clinician: 6/8/2022      Next office visit with prescribing clinician: 12/8/2022            Vesna Steven MA  06/20/22, 10:53 EDT

## 2022-08-08 ENCOUNTER — TELEPHONE (OUTPATIENT)
Dept: INTERNAL MEDICINE | Facility: CLINIC | Age: 82
End: 2022-08-08

## 2022-08-08 ENCOUNTER — OFFICE VISIT (OUTPATIENT)
Dept: INTERNAL MEDICINE | Facility: CLINIC | Age: 82
End: 2022-08-08

## 2022-08-08 VITALS
HEART RATE: 67 BPM | TEMPERATURE: 97.3 F | OXYGEN SATURATION: 97 % | BODY MASS INDEX: 24.52 KG/M2 | WEIGHT: 185 LBS | HEIGHT: 73 IN | DIASTOLIC BLOOD PRESSURE: 80 MMHG | SYSTOLIC BLOOD PRESSURE: 132 MMHG

## 2022-08-08 DIAGNOSIS — R39.9 UTI SYMPTOMS: Primary | ICD-10-CM

## 2022-08-08 LAB
BILIRUB BLD-MCNC: ABNORMAL MG/DL
CLARITY, POC: ABNORMAL
COLOR UR: YELLOW
EXPIRATION DATE: ABNORMAL
GLUCOSE UR STRIP-MCNC: NEGATIVE MG/DL
KETONES UR QL: NEGATIVE
LEUKOCYTE EST, POC: NEGATIVE
Lab: ABNORMAL
NITRITE UR-MCNC: NEGATIVE MG/ML
PH UR: 1 [PH] (ref 5–8)
PROT UR STRIP-MCNC: ABNORMAL MG/DL
RBC # UR STRIP: NEGATIVE /UL
SP GR UR: 6 (ref 1–1.03)
UROBILINOGEN UR QL: NORMAL

## 2022-08-08 PROCEDURE — 99213 OFFICE O/P EST LOW 20 MIN: CPT | Performed by: INTERNAL MEDICINE

## 2022-08-08 PROCEDURE — 81003 URINALYSIS AUTO W/O SCOPE: CPT | Performed by: INTERNAL MEDICINE

## 2022-08-08 RX ORDER — CEPHALEXIN 500 MG/1
500 CAPSULE ORAL 3 TIMES DAILY
Qty: 21 CAPSULE | Refills: 0 | Status: SHIPPED | OUTPATIENT
Start: 2022-08-08 | End: 2022-12-06

## 2022-08-08 RX ORDER — SENNA PLUS 8.6 MG/1
2 TABLET ORAL
COMMUNITY

## 2022-08-08 NOTE — TELEPHONE ENCOUNTER
PCP had a cancellation on schedule- patient was contacted and is scheduled today 08/08/2022 for further evaluation of symptoms

## 2022-08-08 NOTE — TELEPHONE ENCOUNTER
Patient wife called and states patient did not sleep last night due to pain and burning with urination and asking to get worked in today by dr luciano. No openings please advise.

## 2022-08-08 NOTE — PROGRESS NOTES
Subjective   Tristan Haines is a 82 y.o. male.     Chief Complaint   Patient presents with   • Difficulty Urinating     Days; burning with urination; pain behind scrotum        History of Present Illness   patient complaints several days dysuria urination pain denies any frequency urgency denies any flank pain. No fever chills.    Current Outpatient Medications:   •  aspirin 81 MG tablet, Take 81 mg by mouth Daily., Disp: , Rfl:   •  atorvastatin (LIPITOR) 40 MG tablet, TAKE 1 TABLET BY MOUTH EVERY DAY, Disp: 90 tablet, Rfl: 3  •  betamethasone dipropionate 0.05 % cream, Apply  topically to the appropriate area as directed 2 (Two) Times a Day., Disp: 45 g, Rfl: 1  •  carvedilol (COREG) 25 MG tablet, TAKE 1 TABLET BY MOUTH TWO TIMES A DAY, Disp: 180 tablet, Rfl: 3  •  Cholecalciferol (VITAMIN D3) 2000 UNITS tablet, Take 1 tablet by mouth Daily., Disp: , Rfl:   •  gabapentin (NEURONTIN) 400 MG capsule, TAKE 1 CAPSULE BY MOUTH THREE TIMES A DAY, Disp: 270 capsule, Rfl: 2  •  glucose blood (ONE TOUCH ULTRA TEST) test strip, use to test blood glucose three times daily for DM E11.9, Disp: 270 each, Rfl: 6  •  insulin aspart (NovoLOG FlexPen) 100 UNIT/ML solution pen-injector sc pen, INJECT 8 UNITS UNDER THE SKIN THREE TIMES DAILY with meals AS DIRECTED, Disp: 15 mL, Rfl: 0  •  Insulin Pen Needle (BD Pen Needle Rimma 2nd Gen) 32G X 4 MM misc, FSBS bid, Disp: 200 each, Rfl: 6  •  lansoprazole (Prevacid) 30 MG capsule, Take 1 capsule by mouth Daily., Disp: 90 capsule, Rfl: 3  •  losartan (COZAAR) 100 MG tablet, TAKE 1 TABLET BY MOUTH ONE TIME A DAY , Disp: 90 tablet, Rfl: 1  •  nitroglycerin (Nitrostat) 0.4 MG SL tablet, Place 1 tablet under the tongue Every 5 (Five) Minutes As Needed for Chest Pain. Take no more than 3 doses in 15 minutes., Disp: 50 tablet, Rfl: 12  •  Phenylephrine-Cocoa Butter (PREPARATION H RE), Insert  into the rectum., Disp: , Rfl:   •  Probiotic Product (PROBIOTIC DAILY PO), Take 1 tablet by mouth Daily.,  Disp: , Rfl:   •  Toujeo SoloStar 300 UNIT/ML solution pen-injector injection, INJECT 25 UNITS SUBCUTANEOUSLY INTO THE APPROPRIATE AREA TWO TIMES A DAY AS DIRECTED., Disp: 4.5 mL, Rfl: 11  •  traZODone (DESYREL) 50 MG tablet, TAKE 1 TABLET BY MOUTH AT BEDTIME, Disp: 90 tablet, Rfl: 3  •  cephalexin (Keflex) 500 MG capsule, Take 1 capsule by mouth 3 (Three) Times a Day., Disp: 21 capsule, Rfl: 0  •  polyethylene glycol (MIRALAX) powder, Take 17 g by mouth Daily. (Patient taking differently: Take 17 g by mouth 1 (One) Time.), Disp: 510 g, Rfl: 2  •  senna (SENOKOT) 8.6 MG tablet, Take 2 tablets by mouth., Disp: , Rfl:     The following portions of the patient's history were reviewed and updated as appropriate: allergies, current medications, past family history, past medical history, past social history, past surgical history and problem list.    Review of Systems   Constitutional: Negative.    Respiratory: Negative.    Cardiovascular: Negative.    Gastrointestinal: Negative.    Genitourinary: Positive for dysuria.   Musculoskeletal: Negative.    Skin: Negative.    Neurological: Negative.    Psychiatric/Behavioral: Negative.        Objective   Physical Exam  Constitutional:       Appearance: He is well-developed.   Cardiovascular:      Rate and Rhythm: Normal rate and regular rhythm.      Heart sounds: Normal heart sounds.   Pulmonary:      Effort: Pulmonary effort is normal.      Breath sounds: Normal breath sounds.   Abdominal:      General: Bowel sounds are normal.      Palpations: Abdomen is soft.   Musculoskeletal:      Cervical back: Neck supple.   Neurological:      Mental Status: He is alert and oriented to person, place, and time.   Psychiatric:         Mood and Affect: Mood normal.         Behavior: Behavior normal.         Urinalysis dip test unremarkable    Assessment & Plan   Diagnoses and all orders for this visit:    UTI symptoms according to the symptoms probable UTI we were initiate antibiotics and  will also do urinalysis microscopic to confirm  -     POCT urinalysis dipstick, automated  -     cephalexin (Keflex) 500 MG capsule; Take 1 capsule by mouth 3 (Three) Times a Day.    Call if no better

## 2022-08-10 LAB
APPEARANCE UR: CLEAR
BACTERIA #/AREA URNS HPF: NORMAL /[HPF]
BILIRUB UR QL STRIP: NEGATIVE
CASTS URNS QL MICRO: NORMAL /LPF
COLOR UR: YELLOW
EPI CELLS #/AREA URNS HPF: NORMAL /HPF (ref 0–10)
GLUCOSE UR QL STRIP: NEGATIVE
HGB UR QL STRIP: NEGATIVE
KETONES UR QL STRIP: ABNORMAL
LEUKOCYTE ESTERASE UR QL STRIP: NEGATIVE
MICRO URNS: ABNORMAL
MICRO URNS: ABNORMAL
NITRITE UR QL STRIP: NEGATIVE
PH UR STRIP: 5.5 [PH] (ref 5–7.5)
PROT UR QL STRIP: ABNORMAL
RBC #/AREA URNS HPF: NORMAL /HPF (ref 0–2)
SP GR UR STRIP: 1.02 (ref 1–1.03)
UROBILINOGEN UR STRIP-MCNC: 1 MG/DL (ref 0.2–1)
WBC #/AREA URNS HPF: NORMAL /HPF (ref 0–5)

## 2022-08-10 RX ORDER — BETAMETHASONE DIPROPIONATE 0.5 MG/G
CREAM TOPICAL 2 TIMES DAILY
Qty: 45 G | Refills: 0 | Status: SHIPPED | OUTPATIENT
Start: 2022-08-10

## 2022-08-10 NOTE — TELEPHONE ENCOUNTER
Rx Refill Note  Requested Prescriptions     Pending Prescriptions Disp Refills   • betamethasone dipropionate 0.05 % cream [Pharmacy Med Name: Betamethasone Dipropionate External Cream 0.05 %] 45 g 0     Sig: Apply  topically to the appropriate area as directed 2 (Two) Times a Day.      Last office visit with prescribing clinician: 8/8/2022      Next office visit with prescribing clinician: 12/8/2022            Vesna Steven MA  08/10/22, 09:11 EDT

## 2022-09-03 DIAGNOSIS — E11.69 TYPE 2 DIABETES MELLITUS WITH OTHER SPECIFIED COMPLICATION, WITHOUT LONG-TERM CURRENT USE OF INSULIN: ICD-10-CM

## 2022-09-03 RX ORDER — INSULIN ASPART 100 [IU]/ML
INJECTION, SOLUTION INTRAVENOUS; SUBCUTANEOUS
Qty: 15 ML | Refills: 3 | Status: SHIPPED | OUTPATIENT
Start: 2022-09-03

## 2022-09-06 RX ORDER — INSULIN ASPART 100 [IU]/ML
INJECTION, SOLUTION INTRAVENOUS; SUBCUTANEOUS
Qty: 15 ML | Refills: 0 | OUTPATIENT
Start: 2022-09-06

## 2022-11-08 RX ORDER — ATORVASTATIN CALCIUM 40 MG/1
TABLET, FILM COATED ORAL
Qty: 90 TABLET | Refills: 3 | Status: SHIPPED | OUTPATIENT
Start: 2022-11-08

## 2022-11-08 RX ORDER — CARVEDILOL 25 MG/1
TABLET ORAL
Qty: 180 TABLET | Refills: 3 | Status: SHIPPED | OUTPATIENT
Start: 2022-11-08

## 2022-11-08 RX ORDER — ATORVASTATIN CALCIUM 40 MG/1
TABLET, FILM COATED ORAL
Qty: 90 TABLET | Refills: 0 | OUTPATIENT
Start: 2022-11-08

## 2022-11-08 RX ORDER — TRAZODONE HYDROCHLORIDE 50 MG/1
TABLET ORAL
Qty: 90 TABLET | Refills: 3 | Status: SHIPPED | OUTPATIENT
Start: 2022-11-08

## 2022-11-08 NOTE — TELEPHONE ENCOUNTER
Rx Refill Note  Requested Prescriptions     Pending Prescriptions Disp Refills   • atorvastatin (LIPITOR) 40 MG tablet [Pharmacy Med Name: Atorvastatin Calcium Oral Tablet 40 MG] 90 tablet 0     Sig: TAKE 1 TABLET BY MOUTH EVERY DAY   • carvedilol (COREG) 25 MG tablet [Pharmacy Med Name: Carvedilol Oral Tablet 25 MG] 180 tablet 0     Sig: TAKE 1 TABLET BY MOUTH TWO TIMES A DAY   • traZODone (DESYREL) 50 MG tablet [Pharmacy Med Name: traZODone HCl Oral Tablet 50 MG] 90 tablet 0     Sig: TAKE 1 TABLET BY MOUTH AT BEDTIME      Last office visit with prescribing clinician: 8/8/2022      Next office visit with prescribing clinician:  12/08/2022           Vesna Steven MA  11/08/22, 12:29 EST

## 2022-12-06 ENCOUNTER — OFFICE VISIT (OUTPATIENT)
Dept: INTERNAL MEDICINE | Facility: CLINIC | Age: 82
End: 2022-12-06

## 2022-12-06 VITALS
HEART RATE: 65 BPM | WEIGHT: 190 LBS | HEIGHT: 73 IN | OXYGEN SATURATION: 97 % | TEMPERATURE: 96.8 F | DIASTOLIC BLOOD PRESSURE: 70 MMHG | SYSTOLIC BLOOD PRESSURE: 140 MMHG | BODY MASS INDEX: 25.18 KG/M2

## 2022-12-06 DIAGNOSIS — M54.16 LUMBAR RADICULOPATHY: ICD-10-CM

## 2022-12-06 DIAGNOSIS — E78.49 OTHER HYPERLIPIDEMIA: ICD-10-CM

## 2022-12-06 DIAGNOSIS — E08.00 DIABETES MELLITUS DUE TO UNDERLYING CONDITION WITH HYPEROSMOLARITY WITHOUT COMA, WITHOUT LONG-TERM CURRENT USE OF INSULIN: ICD-10-CM

## 2022-12-06 DIAGNOSIS — I10 PRIMARY HYPERTENSION: Primary | ICD-10-CM

## 2022-12-06 PROCEDURE — 99214 OFFICE O/P EST MOD 30 MIN: CPT | Performed by: INTERNAL MEDICINE

## 2022-12-06 RX ORDER — AMLODIPINE BESYLATE 10 MG/1
10 TABLET ORAL DAILY
COMMUNITY
Start: 2022-11-23

## 2022-12-06 NOTE — PROGRESS NOTES
Subjective   Tristan Haines is a 82 y.o. male.     Chief Complaint   Patient presents with   • Hospital Follow Up Visit     [facility]   • Hypertension     Discuss medication- was prescribed Amlodipine by hospital        History of Present Illness   Patient here for follow-up recently developed high blood pressure.  Patient states that it is not due to lower back pain and the sciatica area.  After nerve block a week ago pain is much improved.  Patient was started Norvasc 10 mg daily for blood pressure.  Blood pressure today 140/70.  Patient denies any headache blurry vision.  Home blood pressure also improved.  Patient also has a hyperlipidemia on medication needs to be followed up in the diabetes on medication needs to be followed up.    Current Outpatient Medications:   •  aspirin 81 MG tablet, Take 81 mg by mouth Daily., Disp: , Rfl:   •  atorvastatin (LIPITOR) 40 MG tablet, TAKE 1 TABLET BY MOUTH EVERY DAY, Disp: 90 tablet, Rfl: 3  •  betamethasone dipropionate 0.05 % cream, Apply  topically to the appropriate area as directed 2 (Two) Times a Day., Disp: 45 g, Rfl: 0  •  carvedilol (COREG) 25 MG tablet, TAKE 1 TABLET BY MOUTH TWO TIMES A DAY, Disp: 180 tablet, Rfl: 3  •  Cholecalciferol (VITAMIN D3) 2000 UNITS tablet, Take 1 tablet by mouth Daily., Disp: , Rfl:   •  gabapentin (NEURONTIN) 400 MG capsule, TAKE 1 CAPSULE BY MOUTH THREE TIMES A DAY, Disp: 270 capsule, Rfl: 2  •  glucose blood (ONE TOUCH ULTRA TEST) test strip, use to test blood glucose three times daily for DM E11.9, Disp: 270 each, Rfl: 6  •  insulin aspart (NovoLOG FlexPen) 100 UNIT/ML solution pen-injector sc pen, INJECT 8 UNITS UNDER THE SKIN THREE TIMES DAILY with meals AS DIRECTED, Disp: 15 mL, Rfl: 3  •  Insulin Pen Needle (BD Pen Needle Rimma 2nd Gen) 32G X 4 MM misc, FSBS bid, Disp: 200 each, Rfl: 6  •  lansoprazole (Prevacid) 30 MG capsule, Take 1 capsule by mouth Daily., Disp: 90 capsule, Rfl: 3  •  losartan (COZAAR) 100 MG tablet, TAKE 1  TABLET BY MOUTH ONE TIME A DAY , Disp: 90 tablet, Rfl: 1  •  nitroglycerin (Nitrostat) 0.4 MG SL tablet, Place 1 tablet under the tongue Every 5 (Five) Minutes As Needed for Chest Pain. Take no more than 3 doses in 15 minutes., Disp: 50 tablet, Rfl: 12  •  polyethylene glycol (MIRALAX) powder, Take 17 g by mouth Daily. (Patient taking differently: Take 17 g by mouth 1 (One) Time.), Disp: 510 g, Rfl: 2  •  Probiotic Product (PROBIOTIC DAILY PO), Take 1 tablet by mouth Daily., Disp: , Rfl:   •  senna (SENOKOT) 8.6 MG tablet, Take 2 tablets by mouth., Disp: , Rfl:   •  Toujeo SoloStar 300 UNIT/ML solution pen-injector injection, INJECT 25 UNITS SUBCUTANEOUSLY INTO THE APPROPRIATE AREA TWO TIMES A DAY AS DIRECTED., Disp: 4.5 mL, Rfl: 11  •  traZODone (DESYREL) 50 MG tablet, TAKE 1 TABLET BY MOUTH AT BEDTIME, Disp: 90 tablet, Rfl: 3  •  amLODIPine (NORVASC) 10 MG tablet, Take 10 mg by mouth Daily., Disp: , Rfl:     The following portions of the patient's history were reviewed and updated as appropriate: allergies, current medications, past family history, past medical history, past social history, past surgical history and problem list.    Review of Systems   Constitutional: Negative.    Respiratory: Negative.    Cardiovascular: Negative.    Gastrointestinal: Negative.    Musculoskeletal: Positive for back pain.   Skin: Negative.    Neurological: Negative.    Psychiatric/Behavioral: Negative.        Objective   Physical Exam  Cardiovascular:      Rate and Rhythm: Normal rate and regular rhythm.      Heart sounds: Normal heart sounds.   Pulmonary:      Effort: Pulmonary effort is normal.      Breath sounds: Normal breath sounds.   Abdominal:      General: Bowel sounds are normal.   Musculoskeletal:      Cervical back: Neck supple.   Skin:     General: Skin is warm.   Neurological:      Mental Status: He is alert and oriented to person, place, and time.   Psychiatric:         Behavior: Behavior normal.         All tests  have been reviewed.    Assessment & Plan   Diagnoses and all orders for this visit:    Primary hypertension stable  After norvasc addition and cont norvasc     Other hyperlipidemia stable on lipitor 40 daily cont and do lab  -     CK  -     Comprehensive Metabolic Panel  -     Lipid Panel    Diabetes mellitus due to underlying condition with hyperosmolarity without coma, without long-term current use of insulin (HCC) stable on insulin cont med   -     Hemoglobin A1c    Lumbar radiculopathy follow pain clinic receiving shots     Other orders  -     amLODIPine (NORVASC) 10 MG tablet; Take 10 mg by mouth Daily.    3 mo wellness do blood tests

## 2023-01-26 NOTE — TELEPHONE ENCOUNTER
PA ran for Lansoprazole (prevacid)    Key: [id number]    PA APPROVED  Pharmacy notified.     [Total] : total [Does not reveal pre-existing condition(s) that may affect treatment/prognosis] : does not reveal pre-existing condition(s) that may affect treatment/prognosis [Cannot return to work because ________] : cannot return to work because [unfilled] [15+ days] : 15+ days [Patient] : patient [No Rx restrictions] : No Rx restrictions. [I provided the services listed above] :  I provided the services listed above.

## 2023-02-06 DIAGNOSIS — K21.9 GASTROESOPHAGEAL REFLUX DISEASE WITHOUT ESOPHAGITIS: ICD-10-CM

## 2023-02-06 RX ORDER — LANSOPRAZOLE 30 MG/1
CAPSULE, DELAYED RELEASE ORAL
Qty: 90 CAPSULE | Refills: 3 | Status: SHIPPED | OUTPATIENT
Start: 2023-02-06

## 2023-02-06 NOTE — TELEPHONE ENCOUNTER
Rx Refill Note  Requested Prescriptions     Pending Prescriptions Disp Refills   • lansoprazole (PREVACID) 30 MG capsule [Pharmacy Med Name: Lansoprazole Oral Capsule Delayed Release 30 MG] 90 capsule 0     Sig: TAKE 1 CAPSULE BY MOUTH EVERY DAY      Last office visit with prescribing clinician: 12/6/2022     Next office visit with prescribing clinician: 3/9/2023                         Would you like a call back once the refill request has been completed: [] Yes [] No    If the office needs to give you a call back, can they leave a voicemail: [] Yes [] No    Vesna Steven MA  02/06/23, 11:38 EST

## 2023-03-18 DIAGNOSIS — G62.9 PERIPHERAL POLYNEUROPATHY: ICD-10-CM

## 2023-03-20 RX ORDER — GABAPENTIN 400 MG/1
CAPSULE ORAL
Qty: 270 CAPSULE | Refills: 0 | Status: SHIPPED | OUTPATIENT
Start: 2023-03-20

## 2023-03-22 DIAGNOSIS — E11.69 TYPE 2 DIABETES MELLITUS WITH OTHER SPECIFIED COMPLICATION, WITHOUT LONG-TERM CURRENT USE OF INSULIN: ICD-10-CM

## 2023-03-24 RX ORDER — PEN NEEDLE, DIABETIC 32GX 5/32"
NEEDLE, DISPOSABLE MISCELLANEOUS
Qty: 200 EACH | Refills: 0 | Status: SHIPPED | OUTPATIENT
Start: 2023-03-24

## 2023-04-03 ENCOUNTER — TRANSCRIBE ORDERS (OUTPATIENT)
Dept: ADMINISTRATIVE | Facility: HOSPITAL | Age: 83
End: 2023-04-03
Payer: MEDICARE

## 2023-04-12 ENCOUNTER — OFFICE VISIT (OUTPATIENT)
Dept: INTERNAL MEDICINE | Facility: CLINIC | Age: 83
End: 2023-04-12
Payer: MEDICARE

## 2023-04-12 VITALS
HEART RATE: 59 BPM | SYSTOLIC BLOOD PRESSURE: 122 MMHG | TEMPERATURE: 97.4 F | DIASTOLIC BLOOD PRESSURE: 80 MMHG | HEIGHT: 73 IN | BODY MASS INDEX: 25.18 KG/M2 | OXYGEN SATURATION: 97 % | WEIGHT: 190 LBS

## 2023-04-12 DIAGNOSIS — G62.9 PERIPHERAL POLYNEUROPATHY: ICD-10-CM

## 2023-04-12 DIAGNOSIS — C61 MALIGNANT NEOPLASM OF PROSTATE: ICD-10-CM

## 2023-04-12 DIAGNOSIS — M54.16 LUMBAR RADICULOPATHY: ICD-10-CM

## 2023-04-12 DIAGNOSIS — E11.65 TYPE 2 DIABETES MELLITUS WITH HYPERGLYCEMIA, WITHOUT LONG-TERM CURRENT USE OF INSULIN: ICD-10-CM

## 2023-04-12 DIAGNOSIS — N18.4 STAGE 4 CHRONIC KIDNEY DISEASE: ICD-10-CM

## 2023-04-12 DIAGNOSIS — N39.41 URGE INCONTINENCE OF URINE: ICD-10-CM

## 2023-04-12 DIAGNOSIS — E78.2 MIXED HYPERLIPIDEMIA: ICD-10-CM

## 2023-04-12 DIAGNOSIS — F51.01 PRIMARY INSOMNIA: Chronic | ICD-10-CM

## 2023-04-12 DIAGNOSIS — R60.0 EDEMA OF LOWER EXTREMITY: ICD-10-CM

## 2023-04-12 DIAGNOSIS — K21.00 GASTROESOPHAGEAL REFLUX DISEASE WITH ESOPHAGITIS WITHOUT HEMORRHAGE: ICD-10-CM

## 2023-04-12 DIAGNOSIS — I25.10 CORONARY ARTERY DISEASE INVOLVING NATIVE CORONARY ARTERY OF NATIVE HEART WITHOUT ANGINA PECTORIS: Chronic | ICD-10-CM

## 2023-04-12 DIAGNOSIS — E55.9 VITAMIN D DEFICIENCY: ICD-10-CM

## 2023-04-12 DIAGNOSIS — K76.0 FATTY LIVER: ICD-10-CM

## 2023-04-12 DIAGNOSIS — Z00.00 WELLNESS EXAMINATION: ICD-10-CM

## 2023-04-12 DIAGNOSIS — I10 PRIMARY HYPERTENSION: Primary | ICD-10-CM

## 2023-04-12 PROBLEM — D75.839 THROMBOCYTOSIS: Status: RESOLVED | Noted: 2022-06-08 | Resolved: 2023-04-12

## 2023-04-12 RX ORDER — GABAPENTIN 800 MG/1
800 TABLET ORAL DAILY
COMMUNITY

## 2023-04-12 RX ORDER — INSULIN DEGLUDEC 200 U/ML
INJECTION, SOLUTION SUBCUTANEOUS
Qty: 3 ML | Refills: 6 | Status: SHIPPED | OUTPATIENT
Start: 2023-04-12

## 2023-04-12 NOTE — PROGRESS NOTES
The ABCs of the Annual Wellness Visit  Subsequent Medicare Wellness Visit    Subjective      Tristan Haines is a 83 y.o. male who presents for a Subsequent Medicare Wellness Visit.    The following portions of the patient's history were reviewed and   updated as appropriate: allergies, current medications, past family history, past medical history, past social history, past surgical history and problem list.    Compared to one year ago, the patient feels his physical   health is the same.    Compared to one year ago, the patient feels his mental   health is the same.    Recent Hospitalizations:  He was not admitted to the hospital during the last year.       Current Medical Providers:  Patient Care Team:  Cayden Chiang MD as PCP - General (Internal Medicine)  Vicky Perez MD as Consulting Physician (General Surgery)    Outpatient Medications Prior to Visit   Medication Sig Dispense Refill   • amLODIPine (NORVASC) 10 MG tablet Take 1 tablet by mouth Daily.     • aspirin 81 MG tablet Take 1 tablet by mouth Daily.     • atorvastatin (LIPITOR) 40 MG tablet TAKE 1 TABLET BY MOUTH EVERY DAY 90 tablet 3   • betamethasone dipropionate 0.05 % cream Apply  topically to the appropriate area as directed 2 (Two) Times a Day. 45 g 0   • carvedilol (COREG) 25 MG tablet TAKE 1 TABLET BY MOUTH TWO TIMES A  tablet 3   • Cholecalciferol (VITAMIN D3) 2000 UNITS tablet Take 1 tablet by mouth Daily.     • gabapentin (NEURONTIN) 400 MG capsule TAKE 1 CAPSULE BY MOUTH THREE TIMES A  capsule 0   • gabapentin (NEURONTIN) 800 MG tablet Take 1 tablet by mouth Daily.     • glucose blood (ONE TOUCH ULTRA TEST) test strip use to test blood glucose three times daily for DM E11.9 270 each 6   • insulin aspart (NovoLOG FlexPen) 100 UNIT/ML solution pen-injector sc pen INJECT 8 UNITS UNDER THE SKIN THREE TIMES DAILY with meals AS DIRECTED 15 mL 3   • Insulin Pen Needle (BD Pen Needle Rimma 2nd Gen) 32G X 4 MM misc USE TWICE DAILY AS  DIRECTED FOR INSULIN INJECTIONS 200 each 0   • lansoprazole (PREVACID) 30 MG capsule TAKE 1 CAPSULE BY MOUTH EVERY DAY 90 capsule 3   • losartan (COZAAR) 100 MG tablet TAKE 1 TABLET BY MOUTH ONE TIME A DAY  90 tablet 1   • nitroglycerin (Nitrostat) 0.4 MG SL tablet Place 1 tablet under the tongue Every 5 (Five) Minutes As Needed for Chest Pain. Take no more than 3 doses in 15 minutes. 50 tablet 12   • Probiotic Product (PROBIOTIC DAILY PO) Take 1 tablet by mouth Daily.     • senna (SENOKOT) 8.6 MG tablet Take 2 tablets by mouth.     • traZODone (DESYREL) 50 MG tablet TAKE 1 TABLET BY MOUTH AT BEDTIME 90 tablet 3   • polyethylene glycol (MIRALAX) powder Take 17 g by mouth Daily. (Patient not taking: Reported on 4/12/2023) 510 g 2   • Toujeo SoloStar 300 UNIT/ML solution pen-injector injection INJECT 25 UNITS SUBCUTANEOUSLY INTO THE APPROPRIATE AREA TWO TIMES A DAY AS DIRECTED. (Patient not taking: Reported on 4/12/2023) 4.5 mL 11     No facility-administered medications prior to visit.       No opioid medication identified on active medication list. I have reviewed chart for other potential  high risk medication/s and harmful drug interactions in the elderly.          Aspirin is on active medication list. Aspirin use is indicated based on review of current medical condition/s. Pros and cons of this therapy have been discussed today. Benefits of this medication outweigh potential harm.  Patient has been encouraged to continue taking this medication.  .      Patient Active Problem List   Diagnosis   • Colon polyp   • Edema of lower extremity   • Gastroesophageal reflux disease   • Hyperlipidemia   • Hypertension   • Insomnia   • Lumbar radiculopathy   • Neck pain   • Calculus of kidney   • Peripheral polyneuropathy   • Malignant neoplasm of prostate   • Inflammation of sacroiliac joint   • Vitamin D deficiency   • Hearing problem of both ears   • CAD (coronary artery disease)   • Inguinal hernia   • Chronic hydrocele  "  • Type 2 diabetes mellitus with hyperglycemia, without long-term current use of insulin   • Scoliosis of thoracolumbar spine (S/P Surgery w/ hardware, S/P Epidural pain pump)   • Fatty liver   • Skin cancer   • Urinary incontinence   • Hx of CABG   • Terminal ileitis without complication   • Stage 4 chronic kidney disease     Advance Care Planning   Advance Care Planning     Advance Directive is not on file.  ACP discussion was held with the patient during this visit. Patient has an advance directive (not in EMR), copy requested.     Objective    Vitals:    23 1448   BP: 122/80   Pulse: 59   Temp: 97.4 °F (36.3 °C)   SpO2: 97%   Weight: 86.2 kg (190 lb)   Height: 185.4 cm (72.99\")   PainSc: 0-No pain     Estimated body mass index is 25.07 kg/m² as calculated from the following:    Height as of this encounter: 185.4 cm (72.99\").    Weight as of this encounter: 86.2 kg (190 lb).    BMI is >= 25 and <30. (Overweight) The following options were offered after discussion;: exercise counseling/recommendations and nutrition counseling/recommendations      Does the patient have evidence of cognitive impairment?   No            HEALTH RISK ASSESSMENT    Smoking Status:  Social History     Tobacco Use   Smoking Status Never   Smokeless Tobacco Never     Alcohol Consumption:  Social History     Substance and Sexual Activity   Alcohol Use Not Currently    Comment: Hx wine use in past     Fall Risk Screen:    HOANGADI Fall Risk Assessment was completed, and patient is at LOW risk for falls.Assessment completed on:2023    Depression Screenin/12/2023     2:53 PM   PHQ-2/PHQ-9 Depression Screening   Little Interest or Pleasure in Doing Things 0-->not at all   Feeling Down, Depressed or Hopeless 0-->not at all   PHQ-9: Brief Depression Severity Measure Score 0       Health Habits and Functional and Cognitive Screenin/12/2023     2:53 PM   Functional & Cognitive Status   Do you have difficulty preparing " food and eating? No   Do you have difficulty bathing yourself, getting dressed or grooming yourself? No   Do you have difficulty using the toilet? No   Do you have difficulty moving around from place to place? No   Do you have trouble with steps or getting out of a bed or a chair? No   Current Diet Well Balanced Diet   Dental Exam Up to date        Dental Exam Comment 03/2023   Eye Exam Up to date        Eye Exam Comment 03/2023   Exercise (times per week) 0 times per week   Current Exercises Include No Regular Exercise   Do you need help using the phone?  No   Are you deaf or do you have serious difficulty hearing?  No   Do you need help with transportation? No   Do you need help shopping? No   Do you need help preparing meals?  No   Do you need help with housework?  No   Do you need help with laundry? No   Do you need help taking your medications? No   Do you need help managing money? No   Do you ever drive or ride in a car without wearing a seat belt? No   Have you felt unusual stress, anger or loneliness in the last month? No   Who do you live with? Spouse   If you need help, do you have trouble finding someone available to you? No   Have you been bothered in the last four weeks by sexual problems? No   Do you have difficulty concentrating, remembering or making decisions? No       Age-appropriate Screening Schedule:  Refer to the list below for future screening recommendations based on patient's age, sex and/or medical conditions. Orders for these recommended tests are listed in the plan section. The patient has been provided with a written plan.    Health Maintenance   Topic Date Due   • HEMOGLOBIN A1C  09/04/2022   • LIPID PANEL  03/04/2023   • URINE MICROALBUMIN  03/04/2023   • ZOSTER VACCINE (2 of 2) 04/09/2025 (Originally 11/10/2015)   • TDAP/TD VACCINES (1 - Tdap) 08/05/2026 (Originally 1/11/1959)   • COVID-19 Vaccine (1) 08/20/2026 (Originally 1940)   • INFLUENZA VACCINE  08/01/2023   • DIABETIC EYE  EXAM  10/25/2023   • ANNUAL WELLNESS VISIT  04/12/2024   • COLORECTAL CANCER SCREENING  03/29/2031   • Pneumococcal Vaccine 65+  Completed                  CMS Preventative Services Quick Reference  Risk Factors Identified During Encounter:    None Identified    The above risks/problems have been discussed with the patient.  Pertinent information has been shared with the patient in the After Visit Summary.    Diagnoses and all orders for this visit:    1. Primary hypertension (Primary)  -     CBC & Differential    2. Mixed hyperlipidemia  -     Comprehensive Metabolic Panel  -     Lipid Panel    3. Coronary artery disease involving native coronary artery of native heart without angina pectoris    4. Vitamin D deficiency  -     Vitamin D,25-Hydroxy    5. Type 2 diabetes mellitus with hyperglycemia, without long-term current use of insulin  -     Hemoglobin A1c  -     MicroAlbumin, Urine, Random - Urine, Clean Catch  -     Insulin Degludec (Tresiba FlexTouch) 200 UNIT/ML solution pen-injector pen injection; 14u daily sc  Dispense: 3 mL; Refill: 6    6. Gastroesophageal reflux disease with esophagitis without hemorrhage    7. Stage 4 chronic kidney disease    8. Urge incontinence of urine    9. Malignant neoplasm of prostate    10. Peripheral polyneuropathy    11. Lumbar radiculopathy    12. Primary insomnia    13. Edema of lower extremity    14. Fatty liver    15. Wellness examination  -     TSH  -     Vitamin B12        Follow Up:   Next Medicare Wellness visit to be scheduled in 1 year.      An After Visit Summary and PPPS were made available to the patient.

## 2023-04-12 NOTE — PROGRESS NOTES
Subjective   Tristan Haines is a 83 y.o. male and is here for a comprehensive physical exam.     Do you take any herbs or supplements that were not prescribed by a doctor? no  Are you taking calcium supplements? no  Are you taking aspirin daily? no      The following portions of the patient's history were reviewed and updated as appropriate: allergies, current medications, past family history, past medical history, past social history, past surgical history and problem list.    Patient Active Problem List   Diagnosis   • Colon polyp   • Edema of lower extremity   • Gastroesophageal reflux disease   • Hyperlipidemia   • Hypertension   • Insomnia   • Lumbar radiculopathy   • Neck pain   • Calculus of kidney   • Peripheral polyneuropathy   • Malignant neoplasm of prostate   • Inflammation of sacroiliac joint   • Vitamin D deficiency   • Hearing problem of both ears   • CAD (coronary artery disease)   • Inguinal hernia   • Chronic hydrocele   • Type 2 diabetes mellitus with hyperglycemia, without long-term current use of insulin   • Scoliosis of thoracolumbar spine (S/P Surgery w/ hardware, S/P Epidural pain pump)   • Fatty liver   • Skin cancer   • Urinary incontinence   • Hx of CABG   • Terminal ileitis without complication   • Stage 4 chronic kidney disease       Review of Systems   Constitutional: Negative.    HENT: Negative.    Eyes: Negative.    Respiratory: Negative.    Cardiovascular: Negative.    Gastrointestinal: Negative.    Endocrine: Negative.    Genitourinary: Negative.    Musculoskeletal: Negative.    Skin: Negative.    Allergic/Immunologic: Negative.    Neurological: Negative.    Hematological: Negative.    Psychiatric/Behavioral: Negative.    All other systems reviewed and are negative.      Physical Exam  Vitals and nursing note reviewed.   Constitutional:       Appearance: Normal appearance. He is well-developed and normal weight.   HENT:      Head: Normocephalic and atraumatic.      Right Ear: Tympanic  membrane, ear canal and external ear normal.      Left Ear: Tympanic membrane, ear canal and external ear normal.      Nose: Nose normal.   Eyes:      Conjunctiva/sclera: Conjunctivae normal.      Pupils: Pupils are equal, round, and reactive to light.   Neck:      Thyroid: No thyromegaly.   Cardiovascular:      Rate and Rhythm: Normal rate and regular rhythm.      Heart sounds: Normal heart sounds.   Pulmonary:      Effort: Pulmonary effort is normal.      Breath sounds: Normal breath sounds.   Abdominal:      General: Bowel sounds are normal.      Palpations: Abdomen is soft.   Musculoskeletal:         General: Normal range of motion.      Cervical back: Normal range of motion and neck supple.   Skin:     General: Skin is warm and dry.   Neurological:      General: No focal deficit present.      Mental Status: He is alert and oriented to person, place, and time.      Deep Tendon Reflexes: Reflexes are normal and symmetric.   Psychiatric:         Mood and Affect: Mood normal.         Behavior: Behavior normal.         Thought Content: Thought content normal.         Judgment: Judgment normal.         All  tests have been reviewed.    Assessment & Plan          1. Patient Counseling:  --Nutrition: Stressed importance of moderation in sodium/caffeine intake, saturated fat and cholesterol, caloric balance, sufficient intake of fresh fruits, vegetables, fiber, calcium and iron.  --Exercise: Stressed the importance of regular exercise.   --Injury prevention: Discussed safety belts, safety helmets, smoke detector, smoking near bedding or upholstery.   --Dental health: Discussed importance of regular tooth brushing, flossing, and dental visits.  --Immunizations reviewed.  --Discussed benefits of screening colonoscopy.  --After hours service discussed with patient    2. Discussed the patient's BMI with him.

## 2023-05-03 LAB
25(OH)D3+25(OH)D2 SERPL-MCNC: 41.4 NG/ML (ref 30–100)
ALBUMIN SERPL-MCNC: 4.2 G/DL (ref 3.5–5.2)
ALBUMIN/GLOB SERPL: 1.9 G/DL
ALP SERPL-CCNC: 56 U/L (ref 39–117)
ALT SERPL-CCNC: 13 U/L (ref 1–41)
AST SERPL-CCNC: 13 U/L (ref 1–40)
BASOPHILS # BLD AUTO: 0.03 10*3/MM3 (ref 0–0.2)
BASOPHILS NFR BLD AUTO: 0.5 % (ref 0–1.5)
BILIRUB SERPL-MCNC: 0.7 MG/DL (ref 0–1.2)
BUN SERPL-MCNC: 12 MG/DL (ref 8–23)
BUN/CREAT SERPL: 8.1 (ref 7–25)
CALCIUM SERPL-MCNC: 9.6 MG/DL (ref 8.6–10.5)
CHLORIDE SERPL-SCNC: 109 MMOL/L (ref 98–107)
CHOLEST SERPL-MCNC: 129 MG/DL (ref 0–200)
CO2 SERPL-SCNC: 29.4 MMOL/L (ref 22–29)
CREAT SERPL-MCNC: 1.49 MG/DL (ref 0.76–1.27)
EGFRCR SERPLBLD CKD-EPI 2021: 46.3 ML/MIN/1.73
EOSINOPHIL # BLD AUTO: 0.31 10*3/MM3 (ref 0–0.4)
EOSINOPHIL NFR BLD AUTO: 5 % (ref 0.3–6.2)
ERYTHROCYTE [DISTWIDTH] IN BLOOD BY AUTOMATED COUNT: 12.4 % (ref 12.3–15.4)
GLOBULIN SER CALC-MCNC: 2.2 GM/DL
GLUCOSE SERPL-MCNC: 125 MG/DL (ref 65–99)
HBA1C MFR BLD: 6.6 % (ref 4.8–5.6)
HCT VFR BLD AUTO: 40.7 % (ref 37.5–51)
HDLC SERPL-MCNC: 57 MG/DL (ref 40–60)
HGB BLD-MCNC: 13.8 G/DL (ref 13–17.7)
IMM GRANULOCYTES # BLD AUTO: 0.01 10*3/MM3 (ref 0–0.05)
IMM GRANULOCYTES NFR BLD AUTO: 0.2 % (ref 0–0.5)
LDLC SERPL CALC-MCNC: 55 MG/DL (ref 0–100)
LYMPHOCYTES # BLD AUTO: 2.34 10*3/MM3 (ref 0.7–3.1)
LYMPHOCYTES NFR BLD AUTO: 38.1 % (ref 19.6–45.3)
MCH RBC QN AUTO: 31.7 PG (ref 26.6–33)
MCHC RBC AUTO-ENTMCNC: 33.9 G/DL (ref 31.5–35.7)
MCV RBC AUTO: 93.6 FL (ref 79–97)
MICROALBUMIN UR-MCNC: 3.4 UG/ML
MONOCYTES # BLD AUTO: 0.45 10*3/MM3 (ref 0.1–0.9)
MONOCYTES NFR BLD AUTO: 7.3 % (ref 5–12)
NEUTROPHILS # BLD AUTO: 3 10*3/MM3 (ref 1.7–7)
NEUTROPHILS NFR BLD AUTO: 48.9 % (ref 42.7–76)
NRBC BLD AUTO-RTO: 0 /100 WBC (ref 0–0.2)
PLATELET # BLD AUTO: 142 10*3/MM3 (ref 140–450)
POTASSIUM SERPL-SCNC: 4.4 MMOL/L (ref 3.5–5.2)
PROT SERPL-MCNC: 6.4 G/DL (ref 6–8.5)
RBC # BLD AUTO: 4.35 10*6/MM3 (ref 4.14–5.8)
SODIUM SERPL-SCNC: 145 MMOL/L (ref 136–145)
TRIGL SERPL-MCNC: 91 MG/DL (ref 0–150)
TSH SERPL DL<=0.005 MIU/L-ACNC: 1.08 UIU/ML (ref 0.27–4.2)
VIT B12 SERPL-MCNC: 1704 PG/ML (ref 211–946)
VLDLC SERPL CALC-MCNC: 17 MG/DL (ref 5–40)
WBC # BLD AUTO: 6.14 10*3/MM3 (ref 3.4–10.8)

## 2023-05-07 DIAGNOSIS — E11.69 TYPE 2 DIABETES MELLITUS WITH OTHER SPECIFIED COMPLICATION, WITHOUT LONG-TERM CURRENT USE OF INSULIN: ICD-10-CM

## 2023-05-08 ENCOUNTER — TELEPHONE (OUTPATIENT)
Dept: INTERNAL MEDICINE | Facility: CLINIC | Age: 83
End: 2023-05-08
Payer: MEDICARE

## 2023-05-08 RX ORDER — BLOOD SUGAR DIAGNOSTIC
STRIP MISCELLANEOUS
Qty: 300 EACH | Refills: 3 | Status: SHIPPED | OUTPATIENT
Start: 2023-05-08

## 2023-05-08 NOTE — TELEPHONE ENCOUNTER
PT IS NEEDED A REFERRAL FOR [facility]. PT HAS AN APPOINTMENT SCHEDULED WITH THEM ON 05/17/2023 BUT WAS TOLD HE STILL NEEDED A REFERRAL FOR HIM TO BE SEEN. IS IT POSSIBLE FOR ANOTHER PROVIDER TO SUBMIT HIS REFERRAL SINCE PCP IS ON VACATION? PLEASE INFORM PT IF THAT IS POSSIBLE AND IF HE NEEDS AN APPOINTMENT.

## 2023-05-09 DIAGNOSIS — H91.93 BILATERAL HEARING LOSS, UNSPECIFIED HEARING LOSS TYPE: Primary | ICD-10-CM

## 2023-05-10 RX ORDER — BETAMETHASONE DIPROPIONATE 0.5 MG/G
CREAM TOPICAL
Qty: 45 G | Refills: 0 | Status: SHIPPED | OUTPATIENT
Start: 2023-05-10

## 2023-05-10 NOTE — TELEPHONE ENCOUNTER
Rx Refill Note  Requested Prescriptions     Pending Prescriptions Disp Refills   • betamethasone dipropionate 0.05 % cream [Pharmacy Med Name: Betamethasone Dipropionate External Cream 0.05 %] 45 g 0     Sig: APPLY TOPICALLY TO APPROPRIATE AREA AS DIRECTED TWICE DAILY      Last office visit with prescribing clinician: 4/12/2023  Next office visit with prescribing clinician: return for medicare wellness around 4/12/23  {    Lucrecia Schneider MA  05/10/23, 12:37 EDT

## 2023-05-26 NOTE — TELEPHONE ENCOUNTER
Documents have been signed by PCP and faxed today. I called patient and informed him a copy of the paper work in is the front office for him to .

## 2023-08-24 ENCOUNTER — OFFICE VISIT (OUTPATIENT)
Dept: INTERNAL MEDICINE | Facility: CLINIC | Age: 83
End: 2023-08-24
Payer: MEDICARE

## 2023-08-24 VITALS
HEART RATE: 57 BPM | SYSTOLIC BLOOD PRESSURE: 110 MMHG | BODY MASS INDEX: 24.92 KG/M2 | HEIGHT: 73 IN | OXYGEN SATURATION: 97 % | TEMPERATURE: 97.8 F | DIASTOLIC BLOOD PRESSURE: 68 MMHG | WEIGHT: 188 LBS

## 2023-08-24 DIAGNOSIS — E78.2 MIXED HYPERLIPIDEMIA: ICD-10-CM

## 2023-08-24 DIAGNOSIS — K21.00 GASTROESOPHAGEAL REFLUX DISEASE WITH ESOPHAGITIS WITHOUT HEMORRHAGE: ICD-10-CM

## 2023-08-24 DIAGNOSIS — G62.9 PERIPHERAL POLYNEUROPATHY: ICD-10-CM

## 2023-08-24 DIAGNOSIS — I25.10 CORONARY ARTERY DISEASE INVOLVING NATIVE CORONARY ARTERY OF NATIVE HEART WITHOUT ANGINA PECTORIS: Chronic | ICD-10-CM

## 2023-08-24 DIAGNOSIS — Z79.4 DIABETES MELLITUS TYPE 2, INSULIN DEPENDENT: ICD-10-CM

## 2023-08-24 DIAGNOSIS — E11.69 TYPE 2 DIABETES MELLITUS WITH OTHER SPECIFIED COMPLICATION, WITHOUT LONG-TERM CURRENT USE OF INSULIN: ICD-10-CM

## 2023-08-24 DIAGNOSIS — N18.4 STAGE 4 CHRONIC KIDNEY DISEASE: ICD-10-CM

## 2023-08-24 DIAGNOSIS — M41.9 SCOLIOSIS OF THORACOLUMBAR SPINE, UNSPECIFIED SCOLIOSIS TYPE: ICD-10-CM

## 2023-08-24 DIAGNOSIS — F51.01 PRIMARY INSOMNIA: Chronic | ICD-10-CM

## 2023-08-24 DIAGNOSIS — I10 PRIMARY HYPERTENSION: Primary | ICD-10-CM

## 2023-08-24 DIAGNOSIS — E11.9 DIABETES MELLITUS TYPE 2, INSULIN DEPENDENT: ICD-10-CM

## 2023-08-24 PROBLEM — N18.31 STAGE 3A CHRONIC KIDNEY DISEASE (CKD): Status: RESOLVED | Noted: 2023-08-24 | Resolved: 2023-08-24

## 2023-08-24 PROBLEM — N18.31 STAGE 3A CHRONIC KIDNEY DISEASE (CKD): Status: ACTIVE | Noted: 2023-08-24

## 2023-08-24 RX ORDER — GABAPENTIN 400 MG/1
800 CAPSULE ORAL
Qty: 270 CAPSULE | Refills: 0 | Status: SHIPPED | OUTPATIENT
Start: 2023-08-24

## 2023-08-24 RX ORDER — PEN NEEDLE, DIABETIC 32GX 5/32"
1 NEEDLE, DISPOSABLE MISCELLANEOUS 4 TIMES DAILY
Qty: 200 EACH | Refills: 6 | Status: SHIPPED | OUTPATIENT
Start: 2023-08-24

## 2023-08-24 NOTE — ASSESSMENT & PLAN NOTE
Renal condition is unchanged. Control hypertension and diabetes.   Avoid NSAIDs and nephrotoxic medications

## 2023-08-24 NOTE — PROGRESS NOTES
Office Note     Name: Tristan Hianes    : 1940     MRN: [id number]     Chief Complaint  Hypertension    Subjective     History of Present Illness:  Tristan Haines is a 83 y.o. male who presents today for follow up on chronic conditions.  Last A1c   6.6  Last Eye exam: follows with ophtho in [place]  Last Foot exam (monofilament) : bilateral neuropathy, on gabapentin 400mg 2 tabs everynight  Screen for nephropathy:   Lab Results   Component Value Date    CREATININE 1.49 (H) 2023    BUN 12 2023     2023    K 4.4 2023     (H) 2023    CO2 29.4 (H) 2023   Last labs reviewed  Insomnia; on trazodone, stable  Follows with pain clinic for back pain relieving procedures      Family History:   Family History   Problem Relation Age of Onset    Cancer Mother     Diabetes Father     Hypertension Father     Alcohol abuse Father     Cirrhosis Father     Liver cancer Father     Cancer Sister     Diabetes Brother     Colon cancer Brother     Heart attack Other         grandparent    Arthritis Other     Heart attack Other        Social History:   Social History     Socioeconomic History    Marital status:    Tobacco Use    Smoking status: Never    Smokeless tobacco: Never   Vaping Use    Vaping Use: Never used   Substance and Sexual Activity    Alcohol use: Not Currently     Comment: Hx wine use in past    Drug use: No    Sexual activity: Defer       Health Maintenance   Topic Date Due    ZOSTER VACCINE (2 of 2) 2025 (Originally 11/10/2015)    TDAP/TD VACCINES (1 - Tdap) 2026 (Originally 1959)    COVID-19 Vaccine (1) 2026 (Originally 1940)    INFLUENZA VACCINE  10/01/2023    HEMOGLOBIN A1C  2023    ANNUAL WELLNESS VISIT  2024    LIPID PANEL  2024    URINE MICROALBUMIN  2024    DIABETIC EYE EXAM  2024    COLORECTAL CANCER SCREENING  2031    Pneumococcal Vaccine 65+  Completed       Objective     Vital Signs  BP  "110/68   Pulse 57   Temp 97.8 øF (36.6 øC)   Ht 185.4 cm (72.99\")   Wt 85.3 kg (188 lb)   SpO2 97%   BMI 24.81 kg/mý   Estimated body mass index is 24.81 kg/mý as calculated from the following:    Height as of this encounter: 185.4 cm (72.99\").    Weight as of this encounter: 85.3 kg (188 lb).    BMI is within normal parameters. No other follow-up for BMI required.      Physical Exam  Vitals and nursing note reviewed.   Constitutional:       Appearance: Normal appearance.   HENT:      Head: Normocephalic and atraumatic.   Cardiovascular:      Rate and Rhythm: Normal rate and regular rhythm.      Pulses: Normal pulses.      Heart sounds: Normal heart sounds.   Pulmonary:      Effort: Pulmonary effort is normal. No respiratory distress.      Breath sounds: Normal breath sounds. No wheezing, rhonchi or rales.   Abdominal:      General: Abdomen is flat. Bowel sounds are normal.      Palpations: Abdomen is soft.      Tenderness: There is no abdominal tenderness. There is no guarding.   Musculoskeletal:      Cervical back: Neck supple.   Skin:     General: Skin is warm.      Capillary Refill: Capillary refill takes less than 2 seconds.   Neurological:      General: No focal deficit present.      Mental Status: He is alert. Mental status is at baseline.   Psychiatric:         Mood and Affect: Mood normal.         Behavior: Behavior normal.        Procedures     Assessment and Plan     Diagnoses and all orders for this visit:    1. Primary hypertension (Primary)  Assessment & Plan:  Hypertension is improving with treatment.  Continue current treatment regimen.  Blood pressure will be reassessed  follow up in 2 months .  Stable on current medication and dosage. Will continue current management. Refill medication as necessary.  On amlodipine, carvedilol, losartan      2. Mixed hyperlipidemia  Assessment & Plan:  Lipid abnormalities are improving with treatment.  Stable on current medication and dosage. Will continue " current management. Refill medication as necessary.  On atorvastatin      3. Coronary artery disease involving native coronary artery of native heart without angina pectoris  Assessment & Plan:  Stable on current medication and dosage. Will continue current management. Refill medication as necessary.  On carvedilol, losartan, aspirin, statin  Prn NTG        4. Diabetes mellitus type 2, insulin dependent  Assessment & Plan:  On insulin degludec 14u daily and novolog 8u TID (highest 164)  Last A1c 6.6  Stable on current medication and dosage. Will continue current management. Refill medication as necessary.        5. Gastroesophageal reflux disease with esophagitis without hemorrhage  Assessment & Plan:  Stable on current medication and dosage. Will continue current management. Refill medication as necessary.  On lansoprazole      6. Peripheral polyneuropathy  Assessment & Plan:  Secondary to diabetes mellitus type 2  On gabapentin 400mg 2 capsules at night    Orders:  -     gabapentin (NEURONTIN) 400 MG capsule; Take 2 capsules by mouth every night at bedtime.  Dispense: 270 capsule; Refill: 0    7. Primary insomnia  Assessment & Plan:  On trazodone for years now      8. Type 2 diabetes mellitus with other specified complication, without long-term current use of insulin  -     BD Pen Needle Rimma 2nd Gen 32G X 4 MM misc; 1 each 4 (Four) Times a Day.  Dispense: 200 each; Refill: 6    9. Stage 4 chronic kidney disease  Assessment & Plan:  Renal condition is improving. From last labs GFR 46, puts him at st 3a.  Advised pt to Control hypertension and diabetes.   Avoid NSAIDs and nephrotoxic medications      10. Scoliosis of thoracolumbar spine, unspecified scoliosis type  Assessment & Plan:  Follows with pain clinic for back pain relieving procedures           Counseling was given to patient and family for the following topics: instructions for management, patient and family education, risks and benefits of treatment  options, and importance of treatment compliance.    Follow Up  Return in about 2 months (around 11/1/2023) for 6 month follow up.    MD LIBERTY Jimenez [facility]  [address] [place] [id number] [phone]

## 2023-08-24 NOTE — ASSESSMENT & PLAN NOTE
Lipid abnormalities are improving with treatment.  Stable on current medication and dosage. Will continue current management. Refill medication as necessary.  On atorvastatin

## 2023-08-24 NOTE — ASSESSMENT & PLAN NOTE
Stable on current medication and dosage. Will continue current management. Refill medication as necessary.  On carvedilol, losartan, aspirin, statin  Prn NTG

## 2023-08-24 NOTE — ASSESSMENT & PLAN NOTE
Hypertension is improving with treatment.  Continue current treatment regimen.  Blood pressure will be reassessed  follow up in 2 months .  Stable on current medication and dosage. Will continue current management. Refill medication as necessary.  On amlodipine, carvedilol, losartan

## 2023-08-24 NOTE — ASSESSMENT & PLAN NOTE
Stable on current medication and dosage. Will continue current management. Refill medication as necessary.  On lansoprazole

## 2023-08-24 NOTE — ASSESSMENT & PLAN NOTE
On insulin degludec 14u daily and novolog 8u TID (highest 164)  Last A1c 6.6  Stable on current medication and dosage. Will continue current management. Refill medication as necessary.

## 2023-08-24 NOTE — ASSESSMENT & PLAN NOTE
Renal condition is improving. From last labs GFR 46, puts him at st 3a.  Advised pt to Control hypertension and diabetes.   Avoid NSAIDs and nephrotoxic medications

## 2023-09-18 RX ORDER — ATORVASTATIN CALCIUM 40 MG/1
TABLET, FILM COATED ORAL
Qty: 90 TABLET | Refills: 3 | Status: SHIPPED | OUTPATIENT
Start: 2023-09-18

## 2023-10-06 ENCOUNTER — TELEPHONE (OUTPATIENT)
Dept: INTERNAL MEDICINE | Facility: CLINIC | Age: 83
End: 2023-10-06
Payer: MEDICARE

## 2023-10-06 NOTE — TELEPHONE ENCOUNTER
Caller: Tristan Haines    Relationship: Self    Best call back number: [phone]    Requested Prescriptions:   Requested Prescriptions      No prescriptions requested or ordered in this encounter        Pharmacy where request should be sent:      Last office visit with prescribing clinician: 8/24/2023   Last telemedicine visit with prescribing clinician: Visit date not found   Next office visit with prescribing clinician: 11/1/2023     Additional details provided by patient:     IN USED TO PRESCRIBE MEDICATION FOR STYE.  CAN DR IRVIN SEND A PRESCRIPTION IN?  HIS EYE IS BRIGHT RED SHUTTING AND DRAINING    Does the patient have less than a 3 day supply:  [x] Yes  [] No    Would you like a call back once the refill request has been completed: [] Yes [x] No    If the office needs to give you a call back, can they leave a voicemail: [] Yes [x] No    Charity Lovelace, PCT   10/06/23 10:05 EDT

## 2023-10-20 ENCOUNTER — TRANSCRIBE ORDERS (OUTPATIENT)
Dept: GENERAL RADIOLOGY | Facility: HOSPITAL | Age: 83
End: 2023-10-20
Payer: MEDICARE

## 2023-10-20 ENCOUNTER — HOSPITAL ENCOUNTER (OUTPATIENT)
Dept: GENERAL RADIOLOGY | Facility: HOSPITAL | Age: 83
Discharge: HOME | End: 2023-10-20
Admitting: PAIN MEDICINE
Payer: MEDICARE

## 2023-10-20 DIAGNOSIS — M16.12 PRIMARY OSTEOARTHRITIS OF LEFT HIP: ICD-10-CM

## 2023-10-20 DIAGNOSIS — M16.12 PRIMARY OSTEOARTHRITIS OF LEFT HIP: Primary | ICD-10-CM

## 2023-10-20 PROCEDURE — 73502 X-RAY EXAM HIP UNI 2-3 VIEWS: CPT

## 2023-11-01 ENCOUNTER — HOSPITAL ENCOUNTER (OUTPATIENT)
Dept: CT IMAGING | Facility: HOSPITAL | Age: 83
Discharge: HOME | End: 2023-11-01
Admitting: STUDENT IN AN ORGANIZED HEALTH CARE EDUCATION/TRAINING PROGRAM
Payer: MEDICARE

## 2023-11-01 ENCOUNTER — OFFICE VISIT (OUTPATIENT)
Dept: INTERNAL MEDICINE | Facility: CLINIC | Age: 83
End: 2023-11-01
Payer: MEDICARE

## 2023-11-01 VITALS
DIASTOLIC BLOOD PRESSURE: 74 MMHG | HEIGHT: 73 IN | TEMPERATURE: 97.6 F | SYSTOLIC BLOOD PRESSURE: 134 MMHG | BODY MASS INDEX: 25.29 KG/M2 | OXYGEN SATURATION: 96 % | WEIGHT: 190.8 LBS | HEART RATE: 44 BPM

## 2023-11-01 DIAGNOSIS — R51.9 FRONTAL HEADACHE: ICD-10-CM

## 2023-11-01 DIAGNOSIS — N18.4 STAGE 4 CHRONIC KIDNEY DISEASE: ICD-10-CM

## 2023-11-01 DIAGNOSIS — Z79.899 CONTROLLED SUBSTANCE AGREEMENT SIGNED: ICD-10-CM
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
## 2023-11-01 DIAGNOSIS — I25.10 CORONARY ARTERY DISEASE INVOLVING NATIVE CORONARY ARTERY OF NATIVE HEART WITHOUT ANGINA PECTORIS: Chronic | ICD-10-CM

## 2023-11-01 DIAGNOSIS — E78.2 MIXED HYPERLIPIDEMIA: ICD-10-CM

## 2023-11-01 DIAGNOSIS — Z79.4 DIABETES MELLITUS TYPE 2, INSULIN DEPENDENT: ICD-10-CM

## 2023-11-01 DIAGNOSIS — E11.9 DIABETES MELLITUS TYPE 2, INSULIN DEPENDENT: ICD-10-CM

## 2023-11-01 DIAGNOSIS — K21.00 GASTROESOPHAGEAL REFLUX DISEASE WITH ESOPHAGITIS WITHOUT HEMORRHAGE: ICD-10-CM

## 2023-11-01 DIAGNOSIS — I10 PRIMARY HYPERTENSION: Primary | ICD-10-CM

## 2023-11-01 DIAGNOSIS — F51.01 PRIMARY INSOMNIA: Chronic | ICD-10-CM

## 2023-11-01 DIAGNOSIS — E55.9 VITAMIN D DEFICIENCY: ICD-10-CM

## 2023-11-01 DIAGNOSIS — G62.9 PERIPHERAL POLYNEUROPATHY: ICD-10-CM

## 2023-11-01 PROCEDURE — 70450 CT HEAD/BRAIN W/O DYE: CPT

## 2023-11-01 RX ORDER — ALBUTEROL SULFATE 90 UG/1
2 AEROSOL, METERED RESPIRATORY (INHALATION) EVERY 6 HOURS PRN
Qty: 8 G | Refills: 5 | Status: SHIPPED | OUTPATIENT
Start: 2023-11-01

## 2023-11-01 RX ORDER — GABAPENTIN 600 MG/1
600 TABLET ORAL NIGHTLY
Qty: 30 TABLET | Refills: 2 | Status: SHIPPED | OUTPATIENT
Start: 2023-11-01

## 2023-11-01 RX ORDER — TRAZODONE HYDROCHLORIDE 50 MG/1
25 TABLET ORAL
Qty: 90 TABLET | Refills: 1 | Status: SHIPPED | OUTPATIENT
Start: 2023-11-01

## 2023-11-01 NOTE — ASSESSMENT & PLAN NOTE
Has not been seeing nephrology, Advised to avoid NSAIDs and nephrotoxic medications  We will continue to monitor, last creatinine was 1.49

## 2023-11-01 NOTE — ASSESSMENT & PLAN NOTE
Stable on current medication and dosage. Will continue current management. Refill medication as necessary.  Lansoprazole

## 2023-11-01 NOTE — ASSESSMENT & PLAN NOTE
Stable on current medication and dosage. Will continue current management. Refill medication as necessary.  Carvedilol and losartan

## 2023-11-01 NOTE — ASSESSMENT & PLAN NOTE
Stat CT head given frontal headache  Vital stable, no warning signs of blurring of vision, severe intractable headache or vomiting

## 2023-11-01 NOTE — ASSESSMENT & PLAN NOTE
On insulin degludec 14 units daily, NovoLog 8 units 3 times a day   Stable on current medication and dosage. Will continue current management. Refill medication as necessary.

## 2023-11-01 NOTE — ASSESSMENT & PLAN NOTE
Given patient's recurrent episodes of falling, decrease gabapentin to 600 mg every night.  Advised of risks of gabapentin included loss of balance, syncope and fall.  Patient agreed with complete understanding.  We will continue to work on decreasing the dose thereafter.

## 2023-11-01 NOTE — ASSESSMENT & PLAN NOTE
Stable on current medication and dosage. Will continue current management. Refill medication as necessary.  On vitamin D3

## 2023-11-01 NOTE — ASSESSMENT & PLAN NOTE
Stable on current medication and dosage. Will continue current management. Refill medication as necessary.  On atorvastatin

## 2023-11-01 NOTE — PROGRESS NOTES
Office Note     Name: Tristan Haines    : 1940     MRN: [id number]     Chief Complaint  Follow-up (HYPERTENSION) and Fall (AROUND 2 WEEKS AGO PT STATES HE FELL AND HIT THE BACK OF HIS HEAD PRETTY HARD, HEADACHES HAVE SUBSIDED BUT IS STILL THERE. cONCERNED ABOUT A CONCUSSION.)    Subjective     History of Present Illness:  Tristan Haines is a 83 y.o. male who presents today for chronic conditions.     Diabetes type 2: On insulin, usual blood sugar levels at home  Last A1c   1.49 on 2023  Last Eye exam 10/31/23  Last Foot exam 2023  Screen for nephropathy:   Lab Results   Component Value Date    CREATININE 1.49 (H) 2023    BUN 12 2023     2023    K 4.4 2023     (H) 2023    CO2 29.4 (H) 2023   Peripheral neuropathy sec to DM: gabapentin 800mg at night  Insomnia: trazodone at bedtime, advised to decrease to half  Follows with pain clinic fo back relieving procedures  CKD stage IV: Last creatinine was 1.49, not following with nephrology anymore    Fell from chair about 2 months ago. But recently, tripped over something 3 weeks ago and hit his head on back of head and hit the wall where a tube was on the wall. Since, then, has had headaches on frontal area radiating to temporal area. No BOV, nausea or vomiting.     Family History:   Family History   Problem Relation Age of Onset    Cancer Mother     Diabetes Father     Hypertension Father     Alcohol abuse Father     Cirrhosis Father     Liver cancer Father     Cancer Sister     Diabetes Brother     Colon cancer Brother     Heart attack Other         grandparent    Arthritis Other     Heart attack Other        Social History:   Social History     Socioeconomic History    Marital status:    Tobacco Use    Smoking status: Never    Smokeless tobacco: Never   Vaping Use    Vaping Use: Never used   Substance and Sexual Activity    Alcohol use: Not Currently     Comment: Hx wine use in past    Drug use: No     "Sexual activity: Defer       Health Maintenance   Topic Date Due    INFLUENZA VACCINE  08/01/2023    ZOSTER VACCINE (2 of 2) 04/09/2025 (Originally 11/10/2015)    TDAP/TD VACCINES (1 - Tdap) 08/05/2026 (Originally 1[birth year]/1959)    COVID-19 Vaccine (1) 08/20/2026 (Originally 1940)    HEMOGLOBIN A1C  11/02/2023    ANNUAL WELLNESS VISIT  04/12/2024    BMI FOLLOWUP  04/12/2024    LIPID PANEL  05/02/2024    URINE MICROALBUMIN  05/02/2024    DIABETIC EYE EXAM  06/27/2024    COLORECTAL CANCER SCREENING  03/29/2031    Pneumococcal Vaccine 65+  Completed       Objective     Vital Signs  /74 (BP Location: Left arm, Patient Position: Sitting, Cuff Size: Adult)   Pulse (!) 44   Temp 97.6 °F (36.4 °C) (Temporal)   Ht 185.2 cm (72.9\")   Wt 86.5 kg (190 lb 12.8 oz)   SpO2 96%   BMI 25.24 kg/m²   Estimated body mass index is 25.24 kg/m² as calculated from the following:    Height as of this encounter: 185.2 cm (72.9\").    Weight as of this encounter: 86.5 kg (190 lb 12.8 oz).  BMI is within normal parameters. No other follow-up for BMI required.    Physical Exam  Vitals and nursing note reviewed.   Constitutional:       Appearance: Normal appearance.   HENT:      Head: Normocephalic and atraumatic.      Right Ear: Tympanic membrane and ear canal normal.      Left Ear: Tympanic membrane and ear canal normal.      Nose: Nose normal.      Mouth/Throat:      Mouth: Mucous membranes are moist.      Pharynx: Oropharynx is clear.   Eyes:      Extraocular Movements: Extraocular movements intact.      Conjunctiva/sclera: Conjunctivae normal.      Pupils: Pupils are equal, round, and reactive to light.   Cardiovascular:      Rate and Rhythm: Normal rate and regular rhythm.      Pulses: Normal pulses.      Heart sounds: Normal heart sounds.   Pulmonary:      Effort: Pulmonary effort is normal. No respiratory distress.      Breath sounds: Normal breath sounds. No wheezing, rhonchi or rales.   Abdominal:      General: Abdomen is " flat. Bowel sounds are normal.      Palpations: Abdomen is soft.      Tenderness: There is no abdominal tenderness. There is no guarding.   Musculoskeletal:      Cervical back: Neck supple.   Skin:     General: Skin is warm.      Capillary Refill: Capillary refill takes less than 2 seconds.   Neurological:      General: No focal deficit present.      Mental Status: He is alert. Mental status is at baseline.   Psychiatric:         Mood and Affect: Mood normal.         Behavior: Behavior normal.          Procedures     Assessment and Plan     Diagnoses and all orders for this visit:    1. Primary hypertension (Primary)  Assessment & Plan:  Stable on current medication and dosage. Will continue current management. Refill medication as necessary.  Carvedilol and losartan       2. Mixed hyperlipidemia  Assessment & Plan:  Stable on current medication and dosage. Will continue current management. Refill medication as necessary.  On atorvastatin       3. Diabetes mellitus type 2, insulin dependent  Assessment & Plan:  On insulin degludec 14 units daily, NovoLog 8 units 3 times a day   Stable on current medication and dosage. Will continue current management. Refill medication as necessary.      Orders:  -     gabapentin (NEURONTIN) 600 MG tablet; Take 1 tablet by mouth Every Night.  Dispense: 30 tablet; Refill: 2    4. Gastroesophageal reflux disease with esophagitis without hemorrhage  Assessment & Plan:  Stable on current medication and dosage. Will continue current management. Refill medication as necessary.  Lansoprazole      5. Stage 4 chronic kidney disease  Assessment & Plan:  Has not been seeing nephrology, Advised to avoid NSAIDs and nephrotoxic medications  We will continue to monitor, last creatinine was 1.49      6. Primary insomnia  Assessment & Plan:  Due to recent and recurrent falling, will decrease trazodone to 25 mg at night.      7. Peripheral polyneuropathy  Assessment & Plan:  Given patient's recurrent  episodes of falling, decrease gabapentin to 600 mg every night.  Advised of risks of gabapentin included loss of balance, syncope and fall.  Patient agreed with complete understanding.  We will continue to work on decreasing the dose thereafter.    Orders:  -     ToxAssure Flex 22, Urine - Urine, Random Void; Future  -     ToxAssure Flex 22, Urine - Urine, Random Void  -     gabapentin (NEURONTIN) 600 MG tablet; Take 1 tablet by mouth Every Night.  Dispense: 30 tablet; Refill: 2    8. Coronary artery disease involving native coronary artery of native heart without angina pectoris  Assessment & Plan:  Stable on current medication and dosage. Will continue current management. Refill medication as necessary.  On aspirin      9. Vitamin D deficiency  Assessment & Plan:  Stable on current medication and dosage. Will continue current management. Refill medication as necessary.  On vitamin D3      10. Controlled substance agreement signed  -     ToxAssure Flex 22, Urine - Urine, Random Void; Future  -     ToxAssure Flex 22, Urine - Urine, Random Void    11. Fall, initial encounter  Assessment & Plan:  Stat CT head given frontal headache  Vital stable, no warning signs of blurring of vision, severe intractable headache or vomiting    Orders:  -     CT Head Without Contrast; Future    12. Frontal headache  -     CT Head Without Contrast; Future    Other orders  -     traZODone (DESYREL) 50 MG tablet; Take 0.5 tablets by mouth every night at bedtime.  Dispense: 90 tablet; Refill: 1  -     albuterol sulfate  (90 Base) MCG/ACT inhaler; Inhale 2 puffs Every 6 (Six) Hours As Needed for Wheezing.  Dispense: 8 g; Refill: 5    Stat CT head showedFindings:  Parenchyma:No acute intraparenchymal hemorrhage. No loss of gray-white differentiation to suggest large territory infarct. Mild parenchymal volume loss. Scattered periventricular and subcortical white matter hypodensities, nonspecific, but most often   consistent with small  vessel ischemic changes. No midline shift or herniation.     Ventricles and extra axial spaces: Prominent ventricles and sulci secondary to volume loss. No extra axial fluid collection seen.     Other: Lens replacements. Scattered paranasal sinus mucosal thickening. Mastoid air cells are clear. Calvarium is intact. Intracranial atherosclerotic calcification is present.     IMPRESSION:  Impression:  No evidence of acute intracranial hemorrhage or large territory infarct.    At 1721, I called patient and was able to talk to wife Za, informed him of unremarkable CT head.  May take Tylenol for headaches.  Avoid NSAIDs like ibuprofen or naproxen given CKD.      Counseling was given to patient for the following topics: instructions for management, risks and benefits of treatment options, and importance of treatment compliance.    Follow Up  Return in about 5 months (around 4/14/2024) for Medicare Wellness.    MD LIBERTY Jimenez  [facility]  [address] [place] [address] [address]  [facility] [place] [facility] [place] [id number] [phone]

## 2023-11-01 NOTE — ASSESSMENT & PLAN NOTE
Stable on current medication and dosage. Will continue current management. Refill medication as necessary.  On aspirin

## 2023-11-06 LAB
1OH-MIDAZOLAM UR QL SCN: NOT DETECTED NG/MG CREAT
6MAM UR QL SCN: NEGATIVE NG/ML
7AMINOCLONAZEPAM/CREAT UR: NOT DETECTED NG/MG CREAT
A-OH ALPRAZ/CREAT UR: NOT DETECTED NG/MG CREAT
A-OH-TRIAZOLAM/CREAT UR CFM: NOT DETECTED NG/MG CREAT
ACP UR QL CFM: NOT DETECTED
ALPRAZ/CREAT UR CFM: NOT DETECTED NG/MG CREAT
AMPHETAMINES UR QL SCN: NEGATIVE NG/ML
APAP UR QL SCN: NORMAL UG/ML
APAP UR QL: NORMAL
APAP UR-MCNC: PRESENT UG/ML
BARBITURATES UR QL SCN: NEGATIVE NG/ML
BENZODIAZ SCN METH UR: NEGATIVE
BUPRENORPHINE UR QL SCN: NEGATIVE
BUPRENORPHINE/CREAT UR: NOT DETECTED NG/MG CREAT
CARISOPRODOL UR QL: NEGATIVE NG/ML
CLONAZEPAM/CREAT UR CFM: NOT DETECTED NG/MG CREAT
COCAINE+BZE UR QL SCN: NEGATIVE NG/ML
CREAT UR-MCNC: 253 MG/DL
D-METHORPHAN UR-MCNC: NOT DETECTED NG/ML
D-METHORPHAN+LEVORPHANOL UR QL: NOT DETECTED
DESALKYLFLURAZ/CREAT UR: NOT DETECTED NG/MG CREAT
DIAZEPAM/CREAT UR: NOT DETECTED NG/MG CREAT
ETHANOL UR QL SCN: NEGATIVE G/DL
ETHANOL UR QL SCN: NEGATIVE NG/ML
FENTANYL CTO UR SCN-MCNC: NEGATIVE NG/ML
FENTANYL/CREAT UR: NOT DETECTED NG/MG CREAT
FLUNITRAZEPAM UR QL SCN: NOT DETECTED NG/MG CREAT
GABAPENTIN UR CFM-MCNC: PRESENT NG/ML
GABAPENTIN UR QL CFM: NORMAL
GABAPENTIN UR-MCNC: NORMAL UG/ML
HYPNOTICS UR QL SCN: NEGATIVE
KETAMINE UR QL: NOT DETECTED
LORAZEPAM/CREAT UR: NOT DETECTED NG/MG CREAT
MEPERIDINE UR QL SCN: NEGATIVE NG/ML
METHADONE UR QL SCN: NEGATIVE NG/ML
METHADONE+METAB UR QL SCN: NEGATIVE NG/ML
MIDAZOLAM/CREAT UR CFM: NOT DETECTED NG/MG CREAT
MISCELLANEOUS, UR: NEGATIVE
NORBUPRENORPHINE/CREAT UR: NOT DETECTED NG/MG CREAT
NORDIAZEPAM/CREAT UR: NOT DETECTED NG/MG CREAT
NORFENTANYL/CREAT UR: NOT DETECTED NG/MG CREAT
NORFLUNITRAZEPAM UR-MCNC: NOT DETECTED NG/MG CREAT
NORKETAMINE UR-MCNC: NOT DETECTED UG/ML
OPIATES UR SCN-MCNC: NEGATIVE NG/ML
OTHER HALLUCINOGENS UR: NEGATIVE
OXAZEPAM/CREAT UR: NOT DETECTED NG/MG CREAT
OXYCODONE CTO UR SCN-MCNC: NEGATIVE NG/ML
PCP UR QL SCN: NEGATIVE NG/ML
PRESCRIBED MEDICATIONS: NORMAL
PRESCRIBED MEDICATIONS: NORMAL
PROPOXYPH UR QL SCN: NEGATIVE NG/ML
TAPENTADOL CTO UR SCN-MCNC: NEGATIVE NG/ML
TEMAZEPAM/CREAT UR: NOT DETECTED NG/MG CREAT
TRAMADOL UR QL SCN: NEGATIVE NG/ML
ZALEPLON UR-MCNC: NOT DETECTED NG/ML
ZOLPIDEM PHENYL-4-CARB UR QL SCN: NOT DETECTED
ZOLPIDEM UR QL SCN: NOT DETECTED
ZOPICLONE-N-OXIDE UR-MCNC: NOT DETECTED NG/ML

## 2023-12-29 ENCOUNTER — TELEPHONE (OUTPATIENT)
Dept: INTERNAL MEDICINE | Facility: CLINIC | Age: 83
End: 2023-12-29
Payer: MEDICARE

## 2023-12-29 DIAGNOSIS — Z91.81 AT MODERATE RISK FOR FALL: Primary | ICD-10-CM

## 2023-12-29 NOTE — TELEPHONE ENCOUNTER
Caller: Tristan Haines    Relationship: Self    Best call back number:     [phone] (Home)       What orders are you requesting (i.e. lab or imaging): PATIENT REQUESTING AN ORDER FOR A WALKER.     In what timeframe would the patient need to come in: ASAP    Additional notes: PATIENT STATES THAT HE IS A FALL RISK AND NEEDING TO GET WALKER TO ASSIST HIM   PLEASE CALL AND ADVISE PATIENT IF THIS CAN BE SENT AND DISCUSS WITH HIM THE OPTIONS OF WALKERS THAT WOULD BE AVAILABLE.

## 2024-01-02 DIAGNOSIS — Z91.81 AT MODERATE RISK FOR FALL: Primary | ICD-10-CM

## 2024-03-25 DIAGNOSIS — Z79.4 DIABETES MELLITUS TYPE 2, INSULIN DEPENDENT: ICD-10-CM

## 2024-03-25 DIAGNOSIS — G62.9 PERIPHERAL POLYNEUROPATHY: ICD-10-CM

## 2024-03-25 DIAGNOSIS — E11.9 DIABETES MELLITUS TYPE 2, INSULIN DEPENDENT: ICD-10-CM

## 2024-03-25 DIAGNOSIS — K21.9 GASTROESOPHAGEAL REFLUX DISEASE WITHOUT ESOPHAGITIS: ICD-10-CM

## 2024-03-25 RX ORDER — LANSOPRAZOLE 30 MG/1
30 CAPSULE, DELAYED RELEASE ORAL DAILY
Qty: 90 CAPSULE | Refills: 3 | Status: SHIPPED | OUTPATIENT
Start: 2024-03-25

## 2024-03-25 RX ORDER — GABAPENTIN 600 MG/1
600 TABLET ORAL NIGHTLY
Qty: 30 TABLET | Refills: 0 | Status: SHIPPED | OUTPATIENT
Start: 2024-03-25

## 2024-03-25 NOTE — TELEPHONE ENCOUNTER
Rx Refill Note  Requested Prescriptions     Pending Prescriptions Disp Refills    gabapentin (NEURONTIN) 600 MG tablet 30 tablet 2     Sig: Take 1 tablet by mouth Every Night.    lansoprazole (PREVACID) 30 MG capsule 90 capsule 3     Sig: Take 1 capsule by mouth Daily.      Last office visit with prescribing clinician: 11/1/2023   Last telemedicine visit with prescribing clinician: Visit date not found   Next office visit with prescribing clinician: 4/17/2024     Natalee Good MA  03/25/24, 14:08 EDT

## 2024-03-25 NOTE — TELEPHONE ENCOUNTER
Caller: Tristan Haines    Relationship: Self    Best call back number: [phone]     Requested Prescriptions:   Requested Prescriptions     Pending Prescriptions Disp Refills    gabapentin (NEURONTIN) 600 MG tablet 30 tablet 2     Sig: Take 1 tablet by mouth Every Night.    lansoprazole (PREVACID) 30 MG capsule 90 capsule 3     Sig: Take 1 capsule by mouth Daily.        Pharmacy where request should be sent: [id number], [place] - [address]  - [phone] [facility] [phone]      Last office visit with prescribing clinician: 11/1/2023   Last telemedicine visit with prescribing clinician: Visit date not found   Next office visit with prescribing clinician: 4/17/2024     Additional details provided by patient:     Does the patient have less than a 3 day supply:  [x] Yes  [] No    Would you like a call back once the refill request has been completed: [x] Yes [] No    If the office needs to give you a call back, can they leave a voicemail: [x] Yes [] No    Mariely Franks Rep   03/25/24 14:01 EDT

## 2024-04-17 ENCOUNTER — OFFICE VISIT (OUTPATIENT)
Dept: INTERNAL MEDICINE | Facility: CLINIC | Age: 84
End: 2024-04-17
Payer: MEDICARE

## 2024-04-17 VITALS
BODY MASS INDEX: 24.92 KG/M2 | DIASTOLIC BLOOD PRESSURE: 82 MMHG | OXYGEN SATURATION: 98 % | TEMPERATURE: 97.3 F | WEIGHT: 188 LBS | SYSTOLIC BLOOD PRESSURE: 128 MMHG | HEIGHT: 73 IN | HEART RATE: 82 BPM

## 2024-04-17 DIAGNOSIS — Z79.4 DIABETES MELLITUS TYPE 2, INSULIN DEPENDENT: ICD-10-CM

## 2024-04-17 DIAGNOSIS — I10 PRIMARY HYPERTENSION: Primary | ICD-10-CM

## 2024-04-17 DIAGNOSIS — I25.10 CORONARY ARTERY DISEASE INVOLVING NATIVE CORONARY ARTERY OF NATIVE HEART WITHOUT ANGINA PECTORIS: Chronic | ICD-10-CM

## 2024-04-17 DIAGNOSIS — F51.01 PRIMARY INSOMNIA: Chronic | ICD-10-CM

## 2024-04-17 DIAGNOSIS — K21.00 GASTROESOPHAGEAL REFLUX DISEASE WITH ESOPHAGITIS WITHOUT HEMORRHAGE: ICD-10-CM

## 2024-04-17 DIAGNOSIS — E11.9 DIABETES MELLITUS TYPE 2, INSULIN DEPENDENT: ICD-10-CM

## 2024-04-17 DIAGNOSIS — G62.9 PERIPHERAL POLYNEUROPATHY: ICD-10-CM

## 2024-04-17 DIAGNOSIS — N18.4 STAGE 4 CHRONIC KIDNEY DISEASE: ICD-10-CM

## 2024-04-17 DIAGNOSIS — Z79.899 CONTROLLED SUBSTANCE AGREEMENT SIGNED: ICD-10-CM

## 2024-04-17 DIAGNOSIS — E78.2 MIXED HYPERLIPIDEMIA: ICD-10-CM

## 2024-04-17 PROBLEM — R51.9 FRONTAL HEADACHE: Status: RESOLVED | Noted: 2023-11-01 | Resolved: 2024-04-17

## 2024-04-17 RX ORDER — HYDROCODONE BITARTRATE AND ACETAMINOPHEN 5; 325 MG/1; MG/1
1 TABLET ORAL EVERY 6 HOURS
COMMUNITY
Start: 2024-03-14

## 2024-04-17 RX ORDER — CARVEDILOL 25 MG/1
25 TABLET ORAL 2 TIMES DAILY
Qty: 180 TABLET | Refills: 1 | Status: SHIPPED | OUTPATIENT
Start: 2024-04-17

## 2024-04-17 RX ORDER — AMLODIPINE BESYLATE 5 MG/1
5 TABLET ORAL DAILY
Qty: 90 TABLET | Refills: 1 | Status: SHIPPED | OUTPATIENT
Start: 2024-04-17

## 2024-04-17 NOTE — ASSESSMENT & PLAN NOTE
Decrease trazodone to 25mg nightly, emphasized adverse effects including dizziness, fall, loss of balance and light headedness.

## 2024-04-17 NOTE — ASSESSMENT & PLAN NOTE
Will continue to monitor  Medications reviewed  Avoid NSAIDs, dehydration and maintain BP <140/90. Otherwise, we will continue to monitor.

## 2024-04-17 NOTE — ASSESSMENT & PLAN NOTE
Continue losartan daily  Resume carvedilol twice daily as prescribed  Decrease amlodipine to 5mg daily  Continue to monitor BP at home

## 2024-04-17 NOTE — ASSESSMENT & PLAN NOTE
Stable on current medication and dosage. Will continue current management. Refill medication as necessary.  Gabapentin 600mg daily  CSA signed and UDS done today  Shukri chau from pain clinic

## 2024-04-17 NOTE — ASSESSMENT & PLAN NOTE
Stable on current medication and dosage. Will continue current management. Refill medication as necessary.  atorvastatin   Repeat labs asap

## 2024-04-17 NOTE — PROGRESS NOTES
The ABCs of the Annual Wellness Visit  Subsequent Medicare Wellness Visit    Subjective      Tristan Haines is a 84 y.o. male who presents for a Subsequent Medicare Wellness Visit.    Diabetes type 2: On degludec 14 units daily, novolog 4-5 units TID, usual blood sugar levels at home. Last A1c 6.6% on 5/2023 Last Eye exam 10/31/23 Last Foot exam 8/2023. 113-117 AM and during the day around 140s.   GERD on lansoprazole  HTN on losartan, amlodipine. Occasional lightheadedness in the morning, feeling like BP goes too low. Self discontinued carvedilol and then felt anxiety afterwards.   Peripheral neuropathy sec to DM: gabapentin 600mg at night  Insomnia: still taking full pill 50mg trazodone  Follows with pain clinic fo back relieving procedures  CKD stage IV: Last creatinine was 1.49, not following with nephrology anymore    Review of Systems   All other systems reviewed and are negative.       The following portions of the patient's history were reviewed and   updated as appropriate: allergies, current medications, past family history, past medical history, past social history, past surgical history, and problem list.    Compared to one year ago, the patient feels his physical   health is better.    Compared to one year ago, the patient feels his mental   health is better.    Recent Hospitalizations:  He was not admitted to the hospital during the last year.       Current Medical Providers:  Patient Care Team:  Leatha Redd MD as PCP - General (Family Medicine)  Vicky Perez MD as Consulting Physician (General Surgery)    Outpatient Medications Prior to Visit   Medication Sig Dispense Refill    aspirin 81 MG tablet Take 1 tablet by mouth Daily.      atorvastatin (LIPITOR) 40 MG tablet TAKE 1 TABLET BY MOUTH EVERY DAY 90 tablet 3    BD Pen Needle Rimma 2nd Gen 32G X 4 MM misc 1 each 4 (Four) Times a Day. 200 each 6    betamethasone dipropionate 0.05 % cream APPLY TOPICALLY TO APPROPRIATE AREA AS DIRECTED TWICE  DAILY 45 g 0    Cholecalciferol (VITAMIN D3) 2000 UNITS tablet Take 1 tablet by mouth Daily.      gabapentin (NEURONTIN) 600 MG tablet Take 1 tablet by mouth Every Night. 30 tablet 0    HYDROcodone-acetaminophen (NORCO) 5-325 MG per tablet Take 1 tablet by mouth Every 6 (Six) Hours.      Insulin Degludec (Tresiba FlexTouch) 200 UNIT/ML solution pen-injector pen injection 14u daily sc 3 mL 6    lansoprazole (PREVACID) 30 MG capsule Take 1 capsule by mouth Daily. 90 capsule 3    losartan (COZAAR) 100 MG tablet TAKE 1 TABLET BY MOUTH ONE TIME A DAY  90 tablet 1    nitroglycerin (Nitrostat) 0.4 MG SL tablet Place 1 tablet under the tongue Every 5 (Five) Minutes As Needed for Chest Pain. Take no more than 3 doses in 15 minutes. 50 tablet 12    NovoLOG FlexPen 100 UNIT/ML solution pen-injector sc pen INJECT 8 UNITS UNDER THE SKIN THREE TIMES DAILY with meals AS DIRECTED 15 mL 0    OneTouch Ultra test strip use to test blood glucose three times daily for diabetes mellitus 300 each 3    Probiotic Product (PROBIOTIC DAILY PO) Take 1 tablet by mouth Daily.      senna (SENOKOT) 8.6 MG tablet Take 2 tablets by mouth.      traZODone (DESYREL) 50 MG tablet Take 0.5 tablets by mouth every night at bedtime. 90 tablet 1    amLODIPine (NORVASC) 10 MG tablet Take 1 tablet by mouth Daily.      albuterol sulfate  (90 Base) MCG/ACT inhaler Inhale 2 puffs Every 6 (Six) Hours As Needed for Wheezing. (Patient not taking: Reported on 4/17/2024) 8 g 5    carvedilol (COREG) 25 MG tablet TAKE 1 TABLET BY MOUTH TWO TIMES A DAY (Patient not taking: Reported on 4/17/2024) 180 tablet 3     No facility-administered medications prior to visit.       Opioid medication/s are on active medication list.  and I have evaluated his active treatment plan and pain score trends (see table).  There were no vitals filed for this visit.  I have reviewed the chart for potential of high risk medication and harmful drug interactions in the  "elderly.          Aspirin is on active medication list. Aspirin use is indicated based on review of current medical condition/s. Pros and cons of this therapy have been discussed today. Benefits of this medication outweigh potential harm.  Patient has been encouraged to continue taking this medication.  .      Patient Active Problem List   Diagnosis    Colon polyp    Edema of lower extremity    Gastroesophageal reflux disease    Hyperlipidemia    Hypertension    Insomnia    Lumbar radiculopathy    Calculus of kidney    Peripheral polyneuropathy    Malignant neoplasm of prostate    Inflammation of sacroiliac joint    Vitamin D deficiency    Hearing problem of both ears    CAD (coronary artery disease)    Inguinal hernia    Chronic hydrocele    Diabetes mellitus type 2, insulin dependent    Scoliosis of thoracolumbar spine (S/P Surgery w/ hardware, S/P Epidural pain pump)    Fatty liver    Skin cancer    Urinary incontinence    Hx of CABG    Terminal ileitis without complication    Stage 4 chronic kidney disease    Fall     Advance Care Planning  Advance Directive is not on file.  ACP discussion was held with the patient during this visit. Patient does not have an advance directive, information provided.     Objective    Vitals:    04/17/24 1411   BP: 128/82   Pulse: 82   Temp: 97.3 °F (36.3 °C)   SpO2: 98%   Weight: 85.3 kg (188 lb)   Height: 185.2 cm (72.9\")     Estimated body mass index is 24.87 kg/m² as calculated from the following:    Height as of this encounter: 185.2 cm (72.9\").    Weight as of this encounter: 85.3 kg (188 lb).    Physical Exam  Vitals and nursing note reviewed.   Constitutional:       Appearance: Normal appearance.   HENT:      Head: Normocephalic and atraumatic.   Cardiovascular:      Rate and Rhythm: Normal rate and regular rhythm.      Pulses: Normal pulses.      Heart sounds: Normal heart sounds.   Pulmonary:      Effort: Pulmonary effort is normal. No respiratory distress.      Breath " sounds: Normal breath sounds. No wheezing, rhonchi or rales.   Abdominal:      General: Abdomen is flat. Bowel sounds are normal.      Palpations: Abdomen is soft.      Tenderness: There is no abdominal tenderness. There is no guarding.   Musculoskeletal:      Cervical back: Neck supple.   Skin:     General: Skin is warm.      Capillary Refill: Capillary refill takes less than 2 seconds.   Neurological:      General: No focal deficit present.      Mental Status: He is alert. Mental status is at baseline.   Psychiatric:         Mood and Affect: Mood normal.         Behavior: Behavior normal.         BMI is within normal parameters. No other follow-up for BMI required.      Does the patient have evidence of cognitive impairment?   No            HEALTH RISK ASSESSMENT    Smoking Status:  Social History     Tobacco Use   Smoking Status Never   Smokeless Tobacco Never     Alcohol Consumption:  Social History     Substance and Sexual Activity   Alcohol Use Not Currently    Comment: Hx wine use in past     Fall Risk Screen:    BO Fall Risk Assessment was completed, and patient is at HIGH risk for falls. Assessment completed on:2024    Depression Screenin/12/2023     2:53 PM   PHQ-2/PHQ-9 Depression Screening   Little Interest or Pleasure in Doing Things 0-->not at all   Feeling Down, Depressed or Hopeless 0-->not at all   PHQ-9: Brief Depression Severity Measure Score 0       Health Habits and Functional and Cognitive Screenin/17/2024     2:25 PM   Functional & Cognitive Status   Do you have difficulty preparing food and eating? No   Do you have difficulty bathing yourself, getting dressed or grooming yourself? No   Do you have difficulty using the toilet? No   Do you have difficulty moving around from place to place? Yes   Do you have trouble with steps or getting out of a bed or a chair? No   Current Diet Well Balanced Diet   Dental Exam Up to date   Eye Exam Up to date   Exercise (times per  week) 3 times per week   Current Exercises Include Walking   Do you need help using the phone?  No   Are you deaf or do you have serious difficulty hearing?  No   Do you need help to go to places out of walking distance? No   Do you need help shopping? No   Do you need help preparing meals?  No   Do you need help with housework?  No   Do you need help with laundry? No   Do you need help taking your medications? No   Do you need help managing money? No   Do you ever drive or ride in a car without wearing a seat belt? No   Have you felt unusual stress, anger or loneliness in the last month? No   Who do you live with? Spouse   If you need help, do you have trouble finding someone available to you? No   Have you been bothered in the last four weeks by sexual problems? No   Do you have difficulty concentrating, remembering or making decisions? No       Age-appropriate Screening Schedule:  Refer to the list below for future screening recommendations based on patient's age, sex and/or medical conditions. Orders for these recommended tests are listed in the plan section. The patient has been provided with a written plan.    Health Maintenance   Topic Date Due    HEMOGLOBIN A1C  11/02/2023    ANNUAL WELLNESS VISIT  04/12/2024    LIPID PANEL  05/02/2024    ZOSTER VACCINE (2 of 2) 04/09/2025 (Originally 11/10/2015)    COVID-19 Vaccine (1 - 2023-24 season) 04/17/2025 (Originally 9/1/2023)    RSV Vaccine - Adults (1 - 1-dose 60+ series) 04/17/2025 (Originally 1/11/2000)    TDAP/TD VACCINES (1 - Tdap) 08/05/2026 (Originally 1/11/1959)    URINE MICROALBUMIN  05/02/2024    DIABETIC EYE EXAM  06/27/2024    INFLUENZA VACCINE  08/01/2024    COLORECTAL CANCER SCREENING  03/29/2031    Pneumococcal Vaccine 65+  Completed                CMS Preventative Services Quick Reference  Risk Factors Identified During Encounter:    Dental Screening Recommended  Vision Screening Recommended    The above risks/problems have been discussed with the  patient.  Pertinent information has been shared with the patient in the After Visit Summary.    Diagnoses and all orders for this visit:    1. Primary hypertension (Primary)  Assessment & Plan:  Continue losartan daily  Resume carvedilol twice daily as prescribed  Decrease amlodipine to 5mg daily  Continue to monitor BP at home    Orders:  -     CBC & Differential  -     Comprehensive Metabolic Panel  -     TSH Rfx On Abnormal To Free T4    2. Mixed hyperlipidemia  Assessment & Plan:  Stable on current medication and dosage. Will continue current management. Refill medication as necessary.  atorvastatin   Repeat labs asap    Orders:  -     Lipid Panel    3. Coronary artery disease involving native coronary artery of native heart without angina pectoris  Assessment & Plan:  Continue beta blocker, ARB, aspirin and statin      4. Diabetes mellitus type 2, insulin dependent  Assessment & Plan:  Stable on current medication and dosage. Will continue current management. Refill medication as necessary.  Tresiba 14 untis daily and novolog 4-5 units TID  Have labs done asap    Orders:  -     Hemoglobin A1c    5. Gastroesophageal reflux disease with esophagitis without hemorrhage  Assessment & Plan:  Stable on current medication and dosage. Will continue current management. Refill medication as necessary.  lansoprazole      6. Stage 4 chronic kidney disease  Assessment & Plan:  Will continue to monitor  Medications reviewed  Avoid NSAIDs, dehydration and maintain BP <140/90. Otherwise, we will continue to monitor.         7. Peripheral polyneuropathy  Assessment & Plan:  Stable on current medication and dosage. Will continue current management. Refill medication as necessary.  Gabapentin 600mg daily  CSA signed and UDS done today  Shukri chau from pain clinic      8. Primary insomnia  Assessment & Plan:  Decrease trazodone to 25mg nightly, emphasized adverse effects including dizziness, fall, loss of balance and  light headedness.       Other orders  -     amLODIPine (NORVASC) 5 MG tablet; Take 1 tablet by mouth Daily.  Dispense: 90 tablet; Refill: 1  -     carvedilol (COREG) 25 MG tablet; Take 1 tablet by mouth 2 (Two) Times a Day.  Dispense: 180 tablet; Refill: 1        Follow Up:   Next Medicare Wellness visit to be scheduled in 1 year.      An After Visit Summary and PPPS were made available to the patient.

## 2024-04-17 NOTE — ASSESSMENT & PLAN NOTE
Stable on current medication and dosage. Will continue current management. Refill medication as necessary.  Tresiba 14 untis daily and novolog 4-5 units TID  Have labs done asap

## 2024-04-21 LAB
1OH-MIDAZOLAM UR QL SCN: NOT DETECTED NG/MG CREAT
6MAM UR QL SCN: NEGATIVE NG/ML
7AMINOCLONAZEPAM/CREAT UR: NOT DETECTED NG/MG CREAT
A-OH ALPRAZ/CREAT UR: NOT DETECTED NG/MG CREAT
A-OH-TRIAZOLAM/CREAT UR CFM: NOT DETECTED NG/MG CREAT
ACP UR QL CFM: NOT DETECTED
ALPRAZ/CREAT UR CFM: NOT DETECTED NG/MG CREAT
AMPHET UR CFM-MCNC: NOT DETECTED NG/MG CREAT
AMPHETAMINES UR QL SCN: NORMAL NG/ML
AMPHETAMINES UR QL: NEGATIVE
APAP UR QL SCN: NEGATIVE UG/ML
BARBITURATES UR QL SCN: NEGATIVE NG/ML
BENZODIAZ SCN METH UR: NEGATIVE
BUPRENORPHINE UR QL SCN: NEGATIVE
BUPRENORPHINE/CREAT UR: NOT DETECTED NG/MG CREAT
CARISOPRODOL UR QL: NEGATIVE NG/ML
CLONAZEPAM/CREAT UR CFM: NOT DETECTED NG/MG CREAT
COCAINE+BZE UR QL SCN: NEGATIVE NG/ML
CREAT UR-MCNC: 269 MG/DL
D-METHORPHAN UR-MCNC: NOT DETECTED NG/ML
D-METHORPHAN+LEVORPHANOL UR QL: NOT DETECTED
DESALKYLFLURAZ/CREAT UR: NOT DETECTED NG/MG CREAT
DIAZEPAM/CREAT UR: NOT DETECTED NG/MG CREAT
ETHANOL UR QL SCN: NEGATIVE G/DL
ETHANOL UR QL SCN: NEGATIVE NG/ML
FENTANYL CTO UR SCN-MCNC: NEGATIVE NG/ML
FENTANYL/CREAT UR: NOT DETECTED NG/MG CREAT
FLUNITRAZEPAM UR QL SCN: NOT DETECTED NG/MG CREAT
GABAPENTIN UR CFM-MCNC: PRESENT NG/ML
GABAPENTIN UR QL CFM: NORMAL
GABAPENTIN UR-MCNC: NORMAL UG/ML
HYPNOTICS UR QL SCN: NEGATIVE
KETAMINE UR QL: NOT DETECTED
LORAZEPAM/CREAT UR: NOT DETECTED NG/MG CREAT
MDA UR CFM-MCNC: NOT DETECTED NG/MG CREAT
MDMA UR CFM-MCNC: NOT DETECTED NG/MG CREAT
MEPERIDINE UR QL SCN: NEGATIVE NG/ML
METHADONE UR QL SCN: NEGATIVE NG/ML
METHADONE+METAB UR QL SCN: NEGATIVE NG/ML
METHAMPHET UR CFM-MCNC: NOT DETECTED NG/MG CREAT
MIDAZOLAM/CREAT UR CFM: NOT DETECTED NG/MG CREAT
MISCELLANEOUS, UR: NEGATIVE
NORBUPRENORPHINE/CREAT UR: NOT DETECTED NG/MG CREAT
NORDIAZEPAM/CREAT UR: NOT DETECTED NG/MG CREAT
NORFENTANYL/CREAT UR: NOT DETECTED NG/MG CREAT
NORFLUNITRAZEPAM UR-MCNC: NOT DETECTED NG/MG CREAT
NORKETAMINE UR-MCNC: NOT DETECTED UG/ML
OPIATES UR SCN-MCNC: NEGATIVE NG/ML
OTHER HALLUCINOGENS UR: NEGATIVE
OXAZEPAM/CREAT UR: NOT DETECTED NG/MG CREAT
OXYCODONE CTO UR SCN-MCNC: NEGATIVE NG/ML
PCP UR QL SCN: NEGATIVE NG/ML
PRESCRIBED MEDICATIONS: NORMAL
PROPOXYPH UR QL SCN: NEGATIVE NG/ML
TAPENTADOL CTO UR SCN-MCNC: NEGATIVE NG/ML
TEMAZEPAM/CREAT UR: NOT DETECTED NG/MG CREAT
TRAMADOL UR QL SCN: NEGATIVE NG/ML
ZALEPLON UR-MCNC: NOT DETECTED NG/ML
ZOLPIDEM PHENYL-4-CARB UR QL SCN: NOT DETECTED
ZOLPIDEM UR QL SCN: NOT DETECTED
ZOPICLONE-N-OXIDE UR-MCNC: NOT DETECTED NG/ML

## 2024-04-24 LAB
ALBUMIN SERPL-MCNC: 4.2 G/DL (ref 3.5–5.2)
ALBUMIN/GLOB SERPL: 1.9 G/DL
ALP SERPL-CCNC: 66 U/L (ref 39–117)
ALT SERPL-CCNC: 12 U/L (ref 1–41)
AST SERPL-CCNC: 14 U/L (ref 1–40)
BASOPHILS # BLD AUTO: 0.05 10*3/MM3 (ref 0–0.2)
BASOPHILS NFR BLD AUTO: 0.8 % (ref 0–1.5)
BILIRUB SERPL-MCNC: 0.6 MG/DL (ref 0–1.2)
BUN SERPL-MCNC: 12 MG/DL (ref 8–23)
BUN/CREAT SERPL: 8.6 (ref 7–25)
CALCIUM SERPL-MCNC: 9.4 MG/DL (ref 8.6–10.5)
CHLORIDE SERPL-SCNC: 107 MMOL/L (ref 98–107)
CHOLEST SERPL-MCNC: 128 MG/DL (ref 0–200)
CO2 SERPL-SCNC: 27.1 MMOL/L (ref 22–29)
CREAT SERPL-MCNC: 1.39 MG/DL (ref 0.76–1.27)
EGFRCR SERPLBLD CKD-EPI 2021: 50 ML/MIN/1.73
EOSINOPHIL # BLD AUTO: 0.23 10*3/MM3 (ref 0–0.4)
EOSINOPHIL NFR BLD AUTO: 3.6 % (ref 0.3–6.2)
ERYTHROCYTE [DISTWIDTH] IN BLOOD BY AUTOMATED COUNT: 13.4 % (ref 12.3–15.4)
GLOBULIN SER CALC-MCNC: 2.2 GM/DL
GLUCOSE SERPL-MCNC: 120 MG/DL (ref 65–99)
HBA1C MFR BLD: 6.5 % (ref 4.8–5.6)
HCT VFR BLD AUTO: 40.7 % (ref 37.5–51)
HDLC SERPL-MCNC: 56 MG/DL (ref 40–60)
HGB BLD-MCNC: 13.4 G/DL (ref 13–17.7)
IMM GRANULOCYTES # BLD AUTO: 0.02 10*3/MM3 (ref 0–0.05)
IMM GRANULOCYTES NFR BLD AUTO: 0.3 % (ref 0–0.5)
LDLC SERPL CALC-MCNC: 53 MG/DL (ref 0–100)
LYMPHOCYTES # BLD AUTO: 2.21 10*3/MM3 (ref 0.7–3.1)
LYMPHOCYTES NFR BLD AUTO: 34.6 % (ref 19.6–45.3)
MCH RBC QN AUTO: 29.9 PG (ref 26.6–33)
MCHC RBC AUTO-ENTMCNC: 32.9 G/DL (ref 31.5–35.7)
MCV RBC AUTO: 90.8 FL (ref 79–97)
MONOCYTES # BLD AUTO: 0.47 10*3/MM3 (ref 0.1–0.9)
MONOCYTES NFR BLD AUTO: 7.4 % (ref 5–12)
NEUTROPHILS # BLD AUTO: 3.4 10*3/MM3 (ref 1.7–7)
NEUTROPHILS NFR BLD AUTO: 53.3 % (ref 42.7–76)
NRBC BLD AUTO-RTO: 0 /100 WBC (ref 0–0.2)
PLATELET # BLD AUTO: 197 10*3/MM3 (ref 140–450)
POTASSIUM SERPL-SCNC: 4.2 MMOL/L (ref 3.5–5.2)
PROT SERPL-MCNC: 6.4 G/DL (ref 6–8.5)
RBC # BLD AUTO: 4.48 10*6/MM3 (ref 4.14–5.8)
SODIUM SERPL-SCNC: 141 MMOL/L (ref 136–145)
TRIGL SERPL-MCNC: 103 MG/DL (ref 0–150)
TSH SERPL DL<=0.005 MIU/L-ACNC: 1.3 UIU/ML (ref 0.27–4.2)
VLDLC SERPL CALC-MCNC: 19 MG/DL (ref 5–40)
WBC # BLD AUTO: 6.38 10*3/MM3 (ref 3.4–10.8)

## 2024-05-20 DIAGNOSIS — G62.9 PERIPHERAL POLYNEUROPATHY: ICD-10-CM

## 2024-05-20 DIAGNOSIS — Z79.4 DIABETES MELLITUS TYPE 2, INSULIN DEPENDENT: ICD-10-CM

## 2024-05-20 DIAGNOSIS — E11.9 DIABETES MELLITUS TYPE 2, INSULIN DEPENDENT: ICD-10-CM

## 2024-05-20 RX ORDER — GABAPENTIN 600 MG/1
600 TABLET ORAL
Qty: 30 TABLET | Refills: 0 | Status: SHIPPED | OUTPATIENT
Start: 2024-05-20

## 2024-05-20 NOTE — TELEPHONE ENCOUNTER
Rx Refill Note  Requested Prescriptions     Pending Prescriptions Disp Refills    gabapentin (NEURONTIN) 600 MG tablet [Pharmacy Med Name: Gabapentin Oral Tablet 600 MG] 30 tablet 0     Sig: TAKE 1 TABLET BY MOUTH AT NIGHT      Last office visit with prescribing clinician: 4/17/2024   Last telemedicine visit with prescribing clinician: Visit date not found   Next office visit with prescribing clinician: 10/17/2024   Natalee Good MA  05/20/24, 16:10 EDT

## 2024-07-24 ENCOUNTER — OFFICE VISIT (OUTPATIENT)
Dept: INTERNAL MEDICINE | Facility: CLINIC | Age: 84
End: 2024-07-24
Payer: MEDICARE

## 2024-07-24 VITALS
WEIGHT: 185 LBS | RESPIRATION RATE: 16 BRPM | SYSTOLIC BLOOD PRESSURE: 122 MMHG | DIASTOLIC BLOOD PRESSURE: 70 MMHG | OXYGEN SATURATION: 94 % | HEART RATE: 59 BPM | BODY MASS INDEX: 24.52 KG/M2 | HEIGHT: 73 IN

## 2024-07-24 DIAGNOSIS — E78.2 MIXED HYPERLIPIDEMIA: ICD-10-CM

## 2024-07-24 DIAGNOSIS — M54.16 LUMBAR RADICULOPATHY: ICD-10-CM

## 2024-07-24 DIAGNOSIS — K21.00 GASTROESOPHAGEAL REFLUX DISEASE WITH ESOPHAGITIS WITHOUT HEMORRHAGE: ICD-10-CM

## 2024-07-24 DIAGNOSIS — I25.10 CORONARY ARTERY DISEASE INVOLVING NATIVE CORONARY ARTERY OF NATIVE HEART WITHOUT ANGINA PECTORIS: Chronic | ICD-10-CM

## 2024-07-24 DIAGNOSIS — E11.9 DIABETES MELLITUS TYPE 2, INSULIN DEPENDENT: ICD-10-CM

## 2024-07-24 DIAGNOSIS — F51.01 PRIMARY INSOMNIA: Chronic | ICD-10-CM

## 2024-07-24 DIAGNOSIS — I10 PRIMARY HYPERTENSION: Primary | ICD-10-CM

## 2024-07-24 DIAGNOSIS — Z79.4 DIABETES MELLITUS TYPE 2, INSULIN DEPENDENT: ICD-10-CM

## 2024-07-24 LAB
EXPIRATION DATE: ABNORMAL
EXPIRATION DATE: NORMAL
HBA1C MFR BLD: 6.4 % (ref 4.5–5.7)
Lab: ABNORMAL
Lab: NORMAL
POC CREATININE URINE: NORMAL
POC MICROALBUMIN URINE: NORMAL

## 2024-07-24 PROCEDURE — 3074F SYST BP LT 130 MM HG: CPT | Performed by: STUDENT IN AN ORGANIZED HEALTH CARE EDUCATION/TRAINING PROGRAM

## 2024-07-24 PROCEDURE — 82044 UR ALBUMIN SEMIQUANTITATIVE: CPT | Performed by: STUDENT IN AN ORGANIZED HEALTH CARE EDUCATION/TRAINING PROGRAM

## 2024-07-24 PROCEDURE — 3078F DIAST BP <80 MM HG: CPT | Performed by: STUDENT IN AN ORGANIZED HEALTH CARE EDUCATION/TRAINING PROGRAM

## 2024-07-24 PROCEDURE — G2211 COMPLEX E/M VISIT ADD ON: HCPCS | Performed by: STUDENT IN AN ORGANIZED HEALTH CARE EDUCATION/TRAINING PROGRAM

## 2024-07-24 PROCEDURE — 3044F HG A1C LEVEL LT 7.0%: CPT | Performed by: STUDENT IN AN ORGANIZED HEALTH CARE EDUCATION/TRAINING PROGRAM

## 2024-07-24 PROCEDURE — 83036 HEMOGLOBIN GLYCOSYLATED A1C: CPT | Performed by: STUDENT IN AN ORGANIZED HEALTH CARE EDUCATION/TRAINING PROGRAM

## 2024-07-24 PROCEDURE — 99214 OFFICE O/P EST MOD 30 MIN: CPT | Performed by: STUDENT IN AN ORGANIZED HEALTH CARE EDUCATION/TRAINING PROGRAM

## 2024-07-24 PROCEDURE — 1126F AMNT PAIN NOTED NONE PRSNT: CPT | Performed by: STUDENT IN AN ORGANIZED HEALTH CARE EDUCATION/TRAINING PROGRAM

## 2024-07-24 NOTE — PROGRESS NOTES
Office Note     Name: Tristan Haines    : 1940     MRN: [id number]     Chief Complaint  Hypertension (6 month follow up)    Subjective     History of Present Illness:  Tristan Haines is a 84 y.o. male who presents today for chronic conditions.    Diabetes type 2: On degludec 14 units daily, novolog 4-5 units TID, usual blood sugar levels at home. Last A1c 6.5% on 2023 Last Eye exam 10/31/23 Last Foot exam 2023. 113-117 AM and during the day around 140s.   GERD on lansoprazole, stable symptoms  HTN on losartan, amlodipine, carvedilol 12.5mg BID  HLD on atorvastatin  Peripheral neuropathy sec to DM: gabapentin 600mg at night  Insomnia: still taking full pill 50mg trazodone  Follows with pain clinic fo back relieving procedures  CKD stage IV: Last creatinine was 1.49, not following with nephrology anymore    Family History:   Family History   Problem Relation Age of Onset    Cancer Mother     Diabetes Father     Hypertension Father     Alcohol abuse Father     Cirrhosis Father     Liver cancer Father     Cancer Sister     Diabetes Brother     Colon cancer Brother     Heart attack Other         grandparent    Arthritis Other     Heart attack Other        Social History:   Social History     Socioeconomic History    Marital status:    Tobacco Use    Smoking status: Never    Smokeless tobacco: Never   Vaping Use    Vaping status: Never Used   Substance and Sexual Activity    Alcohol use: Not Currently     Comment: Hx wine use in past    Drug use: No    Sexual activity: Defer       Health Maintenance   Topic Date Due    URINE MICROALBUMIN  2024    DIABETIC EYE EXAM  2024    HEMOGLOBIN A1C  10/24/2024    ZOSTER VACCINE (2 of 2) 2025 (Originally 11/10/2015)    COVID-19 Vaccine ( - -24 season) 2025 (Originally 2023)    RSV Vaccine - Adults (1 - 1-dose 60+ series) 2025 (Originally 2000)    TDAP/TD VACCINES (1 - Tdap) 2026 (Originally 1959)    INFLUENZA  "VACCINE  08/01/2024    ANNUAL WELLNESS VISIT  04/17/2025    LIPID PANEL  04/24/2025    COLORECTAL CANCER SCREENING  03/29/2031    Pneumococcal Vaccine 65+  Completed       Objective     Vital Signs  /70   Pulse 59   Resp 16   Ht 185.2 cm (72.91\")   Wt 83.9 kg (185 lb)   SpO2 94%   BMI 24.47 kg/m²   Estimated body mass index is 24.47 kg/m² as calculated from the following:    Height as of this encounter: 185.2 cm (72.91\").    Weight as of this encounter: 83.9 kg (185 lb).  BMI is within normal parameters. No other follow-up for BMI required.    Physical Exam  Vitals and nursing note reviewed.   Constitutional:       Appearance: Normal appearance.   HENT:      Head: Normocephalic and atraumatic.   Cardiovascular:      Rate and Rhythm: Normal rate and regular rhythm.      Pulses: Normal pulses.      Heart sounds: Normal heart sounds.   Pulmonary:      Effort: Pulmonary effort is normal. No respiratory distress.      Breath sounds: Normal breath sounds. No wheezing, rhonchi or rales.   Abdominal:      General: Abdomen is flat. Bowel sounds are normal.      Palpations: Abdomen is soft.      Tenderness: There is no abdominal tenderness. There is no guarding.   Musculoskeletal:      Cervical back: Neck supple.   Skin:     General: Skin is warm.      Capillary Refill: Capillary refill takes less than 2 seconds.   Neurological:      General: No focal deficit present.      Mental Status: He is alert. Mental status is at baseline.   Psychiatric:         Mood and Affect: Mood normal.         Behavior: Behavior normal.          Procedures     Assessment and Plan     Diagnoses and all orders for this visit:    1. Primary hypertension (Primary)  Assessment & Plan:  Stable on current medication and dosage. Will continue current management. Refill medication as necessary.  Amlodipine, carvedilol, losartan       2. Mixed hyperlipidemia  Assessment & Plan:  Stable on current medication and dosage. Will continue current " management. Refill medication as necessary.  Atorvastatin       3. Coronary artery disease involving native coronary artery of native heart without angina pectoris  Assessment & Plan:  Stable, last echo reviewed noted LVEF of 63% follows with cardiology on carvedilol twice a day, statin, aspirin       4. Diabetes mellitus type 2, insulin dependent  Assessment & Plan:  Stable on current medication and dosage. Will continue current management. Refill medication as necessary.  Degludec 14 units daily and NovoLog 45 units 3 times a day A1c today 6.5%       5. Gastroesophageal reflux disease with esophagitis without hemorrhage  Assessment & Plan:  Stable on current medication and dosage. Will continue current management. Refill medication as necessary.  Lansoprazole      6. Primary insomnia  Assessment & Plan:  Stable on current medication and dosage. Will continue current management. Refill medication as necessary.  Trazodone 50 mg      7. Lumbar radiculopathy  Assessment & Plan:  Stable on current medication and dosage. Will continue current management. Refill medication as necessary.  Gabapentin 600 at night Shukri reviewed okay UDS and CSA done on 4/2024             Counseling was given to patient for the following topics: instructions for management, risk factor reductions, risks and benefits of treatment options, and importance of treatment compliance.    Follow Up  Return in about 6 months (around 1/24/2025) for Medicare Wellness.    MD LIBERTY Jimenez [facility]  [address] [place] [id number] [phone]

## 2024-07-24 NOTE — ASSESSMENT & PLAN NOTE
Stable on current medication and dosage. Will continue current management. Refill medication as necessary.  Amlodipine, carvedilol, losartan

## 2024-07-24 NOTE — ASSESSMENT & PLAN NOTE
Stable on current medication and dosage. Will continue current management. Refill medication as necessary.  Atorvastatin

## 2024-07-24 NOTE — ASSESSMENT & PLAN NOTE
Stable, last echo reviewed noted LVEF of 63% follows with cardiology on carvedilol twice a day, statin, aspirin

## 2024-07-24 NOTE — ASSESSMENT & PLAN NOTE
Stable on current medication and dosage. Will continue current management. Refill medication as necessary.  Gabapentin 600 at night Shukri reviewed okmeghan UDS and CSA done on 4/2024

## 2024-07-24 NOTE — ASSESSMENT & PLAN NOTE
Stable on current medication and dosage. Will continue current management. Refill medication as necessary.  Trazodone 50 mg

## 2024-07-24 NOTE — ASSESSMENT & PLAN NOTE
Stable on current medication and dosage. Will continue current management. Refill medication as necessary.  Degludec 14 units daily and NovoLog 45 units 3 times a day A1c today 6.5%

## 2024-07-31 DIAGNOSIS — E11.9 DIABETES MELLITUS TYPE 2, INSULIN DEPENDENT: ICD-10-CM

## 2024-07-31 DIAGNOSIS — G62.9 PERIPHERAL POLYNEUROPATHY: ICD-10-CM

## 2024-07-31 DIAGNOSIS — Z79.4 DIABETES MELLITUS TYPE 2, INSULIN DEPENDENT: ICD-10-CM

## 2024-08-01 RX ORDER — GABAPENTIN 600 MG/1
600 TABLET ORAL
Qty: 30 TABLET | Refills: 0 | Status: SHIPPED | OUTPATIENT
Start: 2024-08-01

## 2024-08-05 DIAGNOSIS — E11.69 TYPE 2 DIABETES MELLITUS WITH OTHER SPECIFIED COMPLICATION, WITHOUT LONG-TERM CURRENT USE OF INSULIN: ICD-10-CM

## 2024-08-05 RX ORDER — PEN NEEDLE, DIABETIC 32GX 5/32"
1 NEEDLE, DISPOSABLE MISCELLANEOUS 4 TIMES DAILY
Qty: 200 EACH | Refills: 1 | Status: SHIPPED | OUTPATIENT
Start: 2024-08-05

## 2024-08-05 RX ORDER — ATORVASTATIN CALCIUM 40 MG/1
40 TABLET, FILM COATED ORAL DAILY
Qty: 90 TABLET | Refills: 1 | Status: SHIPPED | OUTPATIENT
Start: 2024-08-05

## 2024-08-05 NOTE — TELEPHONE ENCOUNTER
Caller: Tristan Haines    Relationship: Self    Best call back number:  [phone]     Requested Prescriptions:   Requested Prescriptions     Pending Prescriptions Disp Refills    BD Pen Needle Rimma 2nd Gen 32G X 4 MM misc 200 each 6     Sig: Use 1 each 4 (Four) Times a Day.    atorvastatin (LIPITOR) 40 MG tablet 90 tablet 3     Sig: Take 1 tablet by mouth Daily.        Pharmacy where request should be sent: [id number] [place], [place] - [address] - [phone] [facility] [phone] FX     Last office visit with prescribing clinician: 7/24/2024   Last telemedicine visit with prescribing clinician: Visit date not found   Next office visit with prescribing clinician: 10/17/2024     Additional details provided by patient: PATIENT IS COMPLETELY OUT AND NEEDS THE NEEDLES TODAY. PLEASE SEND ASAP.  PHARMACY ADVISED PATIENT THEY HAD REQUESTED IT ALREADY. WE DIDN'T GET IT.  PLEASE CALL PATIENT TO ADVISE ONCE SENT.  THANK YOU    Does the patient have less than a 3 day supply:  [x] Yes  [] No    Would you like a call back once the refill request has been completed: [x] Yes [] No    If the office needs to give you a call back, can they leave a voicemail: [x] Yes [] No    Mariely Alegria Rep   08/05/24 10:40 EDT

## 2024-08-22 DIAGNOSIS — E11.69 TYPE 2 DIABETES MELLITUS WITH OTHER SPECIFIED COMPLICATION, WITHOUT LONG-TERM CURRENT USE OF INSULIN: ICD-10-CM

## 2024-08-22 RX ORDER — INSULIN ASPART 100 [IU]/ML
INJECTION, SOLUTION INTRAVENOUS; SUBCUTANEOUS
Qty: 15 ML | Refills: 0 | Status: SHIPPED | OUTPATIENT
Start: 2024-08-22

## 2024-09-17 NOTE — TELEPHONE ENCOUNTER
----- Message from Cayden Chiang MD sent at 1/16/2017  2:43 PM EST -----  Contact: Patient   Order placed  ----- Message -----     From: Luz Eddy MA     Sent: 1/16/2017   2:27 PM       To: Cayden Chiang MD        ----- Message -----     From: Brenda Payan     Sent: 1/16/2017   2:23 PM       To: Luz Eddy MA    Patient called the office requesting to see if Dr. Chiang would put in a x-ray of his left foot for him. He said that he is suppose to come in and have labs but he is waiting to see if Dr. Chiang will put in the order for an x-ray and then he would come in for both. He said that he is having bad pain in his left foot.     
Notified pt   
18-Sep-2024 00:50

## 2024-09-19 RX ORDER — TRAZODONE HYDROCHLORIDE 50 MG/1
25 TABLET, FILM COATED ORAL
Qty: 90 TABLET | Refills: 1 | Status: SHIPPED | OUTPATIENT
Start: 2024-09-19 | End: 2024-09-23 | Stop reason: SDUPTHER

## 2024-09-23 DIAGNOSIS — E11.9 DIABETES MELLITUS TYPE 2, INSULIN DEPENDENT: ICD-10-CM

## 2024-09-23 DIAGNOSIS — G62.9 PERIPHERAL POLYNEUROPATHY: ICD-10-CM

## 2024-09-23 DIAGNOSIS — Z79.4 DIABETES MELLITUS TYPE 2, INSULIN DEPENDENT: ICD-10-CM

## 2024-09-23 RX ORDER — LOSARTAN POTASSIUM 100 MG/1
100 TABLET ORAL DAILY
Qty: 90 TABLET | Refills: 1 | Status: SHIPPED | OUTPATIENT
Start: 2024-09-23

## 2024-09-23 RX ORDER — TRAZODONE HYDROCHLORIDE 50 MG/1
50 TABLET, FILM COATED ORAL
Qty: 90 TABLET | Refills: 1 | Status: SHIPPED | OUTPATIENT
Start: 2024-09-23 | End: 2024-09-27 | Stop reason: SDUPTHER

## 2024-09-23 RX ORDER — GABAPENTIN 600 MG/1
600 TABLET ORAL
Qty: 30 TABLET | Refills: 0 | Status: SHIPPED | OUTPATIENT
Start: 2024-09-23

## 2024-09-27 DIAGNOSIS — E11.69 TYPE 2 DIABETES MELLITUS WITH OTHER SPECIFIED COMPLICATION, WITHOUT LONG-TERM CURRENT USE OF INSULIN: ICD-10-CM

## 2024-09-27 RX ORDER — BLOOD SUGAR DIAGNOSTIC
STRIP MISCELLANEOUS
Qty: 300 EACH | Refills: 0 | Status: SHIPPED | OUTPATIENT
Start: 2024-09-27

## 2024-09-27 RX ORDER — TRAZODONE HYDROCHLORIDE 50 MG/1
50 TABLET, FILM COATED ORAL
Qty: 90 TABLET | Refills: 1 | Status: SHIPPED | OUTPATIENT
Start: 2024-09-27

## 2024-10-07 ENCOUNTER — TELEPHONE (OUTPATIENT)
Dept: INTERNAL MEDICINE | Facility: CLINIC | Age: 84
End: 2024-10-07
Payer: MEDICARE

## 2024-10-07 NOTE — TELEPHONE ENCOUNTER
Caller: Tristan Haines    Relationship: Self    Best call back number: [phone]     What medication are you requesting: SOMETHING SYMPTOMS    What are your current symptoms:LT  EYE SWOLLEN, IRRITATED, HAS A BUMP ON LT UPPER EYE LID    How long have you been experiencing symptoms: 3 TO 4 DAYS    Have you had these symptoms before:    [] Yes  [x] No    Have you been treated for these symptoms before:   [] Yes  [x] No    If a prescription is needed, what is your preferred pharmacy and phone number: [id number] - [place], [place] - [address] - [phone] [facility] [phone] FX     Additional notes:

## 2024-10-07 NOTE — TELEPHONE ENCOUNTER
I called patient to offer an appointment this evening or in the morning. Patient declined going to try virtual telehealth visit.

## 2024-10-08 RX ORDER — CARVEDILOL 25 MG/1
25 TABLET ORAL 2 TIMES DAILY
Qty: 180 TABLET | Refills: 1 | Status: SHIPPED | OUTPATIENT
Start: 2024-10-08

## 2024-10-17 ENCOUNTER — TELEPHONE (OUTPATIENT)
Dept: INTERNAL MEDICINE | Facility: CLINIC | Age: 84
End: 2024-10-17

## 2024-10-17 NOTE — TELEPHONE ENCOUNTER
"Left voicemail advising Dr Redd out of the office patient can change to telehealth visit or reschedule    Relay     \"Dr Redd out of the office patient can change to telehealth visit or reschedule\"                  "

## 2024-10-22 ENCOUNTER — TELEPHONE (OUTPATIENT)
Dept: INTERNAL MEDICINE | Facility: CLINIC | Age: 84
End: 2024-10-22
Payer: MEDICARE

## 2024-10-22 NOTE — TELEPHONE ENCOUNTER
Pt came in wanting to know if lab orders could be sent in for his apt on 10/24 if needed. Pt would like a call regarding it. Pls advise.

## 2024-11-21 ENCOUNTER — OFFICE VISIT (OUTPATIENT)
Dept: INTERNAL MEDICINE | Facility: CLINIC | Age: 84
End: 2024-11-21
Payer: MEDICARE

## 2024-11-21 ENCOUNTER — TELEPHONE (OUTPATIENT)
Dept: INTERNAL MEDICINE | Facility: CLINIC | Age: 84
End: 2024-11-21
Payer: MEDICARE

## 2024-11-21 VITALS
DIASTOLIC BLOOD PRESSURE: 70 MMHG | TEMPERATURE: 97.5 F | SYSTOLIC BLOOD PRESSURE: 116 MMHG | OXYGEN SATURATION: 96 % | HEART RATE: 64 BPM | BODY MASS INDEX: 24.65 KG/M2 | HEIGHT: 73 IN | RESPIRATION RATE: 20 BRPM | WEIGHT: 186 LBS

## 2024-11-21 DIAGNOSIS — Z79.4 INSULIN-REQUIRING OR DEPENDENT TYPE II DIABETES MELLITUS: ICD-10-CM

## 2024-11-21 DIAGNOSIS — F51.04 INSOMNIA, PSYCHOPHYSIOLOGICAL: ICD-10-CM

## 2024-11-21 DIAGNOSIS — I10 BENIGN ESSENTIAL HYPERTENSION: Primary | ICD-10-CM

## 2024-11-21 DIAGNOSIS — M79.2 NEURALGIA: ICD-10-CM

## 2024-11-21 DIAGNOSIS — E11.9 INSULIN-REQUIRING OR DEPENDENT TYPE II DIABETES MELLITUS: ICD-10-CM

## 2024-11-21 DIAGNOSIS — N18.32 STAGE 3B CHRONIC KIDNEY DISEASE: ICD-10-CM

## 2024-11-21 DIAGNOSIS — E78.2 MIXED HYPERLIPIDEMIA: ICD-10-CM

## 2024-11-21 DIAGNOSIS — Z51.81 THERAPEUTIC DRUG MONITORING: ICD-10-CM

## 2024-11-21 NOTE — PROGRESS NOTES
"Chief Complaint  Hypertension ( ), Hyperlipidemia, and Diabetes    Subjective        Tristan Haines presents to [facility]  HPI: Patient is here to follow up on the blood pressure   which is running low , he is taking the blood pressure medications as prescribed and has had no side effects. The patient is also here to follow up on the cholesterol and   on sugar and ckd and is  due to get lab work done . He takes Tresiba 14 units in the morning and a 4 units NovoLog during the day depending on his sugar ,   He  is taking melatonin otc and it is helping his  insomnia and stopped trazodone, he sees Dr Abdirizak Fink,in [place]   for his chronic back and left hip pain, he saw the eye doctor - dr rivera  and was told he had a bleed in his eye but now it has cleared, he sees his cardiologist at [facility]  and underwent cabg  Hypertension   Pertinent negatives include no chest pain, palpitations or shortness of breath.   Hyperlipidemia   Pertinent negatives include no chest pain or shortness of breath.   Diabetes   Pertinent negatives for hypoglycemia include no dizziness, nervousness/anxiousness or pallor. Pertinent negatives for diabetes include no chest pain, no shortness of breath  Patient is on acei/arb       Objective   Vital Signs:  /70   Pulse 64   Temp 97.5 °F (36.4 °C)   Resp 20   Ht 185.2 cm (72.91\")   Wt 84.4 kg (186 lb)   SpO2 96%   BMI 24.60 kg/m²   Estimated body mass index is 24.6 kg/m² as calculated from the following:    Height as of this encounter: 185.2 cm (72.91\").    Weight as of this encounter: 84.4 kg (186 lb).    BMI is within normal parameters. No other follow-up for BMI required.      Physical Exam  Vitals and nursing note reviewed.   Constitutional:       General: He is not in acute distress.     Appearance: Normal appearance. He is not diaphoretic.   HENT:      Head: Normocephalic and atraumatic.      Right Ear: External ear normal.      Left Ear: " External ear normal.      Nose: Nose normal.   Eyes:      Extraocular Movements: Extraocular movements intact.      Conjunctiva/sclera: Conjunctivae normal.   Neck:      Trachea: Trachea normal.   Cardiovascular:      Rate and Rhythm: Normal rate and regular rhythm.      Heart sounds: Normal heart sounds.   Pulmonary:      Effort: Pulmonary effort is normal. No respiratory distress.   Abdominal:      General: Abdomen is flat.   Musculoskeletal:         General: Deformity present.      Cervical back: Neck supple.      Comments: Moves all limbs   Skin:     General: Skin is warm and dry.      Findings: No erythema.   Neurological:      Mental Status: He is alert and oriented to person, place, and time.      Comments: No gross motor or sensory deficits        Result Review :  The following data was reviewed by: Nat Baires MD on 11/21/2024:  Common labs          4/24/2024    09:53 7/24/2024    17:21   Common Labs   Glucose 120     BUN 12     Creatinine 1.39     Sodium 141     Potassium 4.2     Chloride 107     Calcium 9.4     Total Protein 6.4     Albumin 4.2     Total Bilirubin 0.6     Alkaline Phosphatase 66     AST (SGOT) 14     ALT (SGPT) 12     WBC 6.38     Hemoglobin 13.4     Hematocrit 40.7     Platelets 197     Total Cholesterol 128     Triglycerides 103     HDL Cholesterol 56     LDL Cholesterol  53     Hemoglobin A1C 6.50  6.4                Assessment and Plan   Diagnoses and all orders for this visit:    1. Benign essential hypertension (Primary)    2. Mixed hyperlipidemia  -     CBC (No Diff)  -     Comprehensive Metabolic Panel  -     Lipid Panel    3. Insulin-requiring or dependent type II diabetes mellitus  -     Hemoglobin A1c  -     Microalbumin / Creatinine Urine Ratio - Urine, Clean Catch    4. Stage 3b chronic kidney disease  -     Comprehensive Metabolic Panel    5. Neuralgia  -     Compliance Drug Analysis, Ur - Urine, Clean Catch    6. Therapeutic drug monitoring  -     Compliance Drug  Analysis, Ur - Urine, Clean Catch    7. Insomnia, psychophysiological    Plan:  1.  Benign essential hypertension: Will continue current medication - stop norvasc , low-sodium diet advised, Counseled to regularly check BP at home with goal averaging <130/80.   2.mixed hyperlipidemia: will obtain   fasting CMP and lipid panel.  Diet and exercise counseled,  Will continue current medications  3. Diabetes  Mellitus  : will obtain   fasting CMP  and hba1c   6.4 , diet and exercise counseled , Will continue current medications  4.  CKD stage IIIb: Oral hydration, monitor BMP  5.  Neuralgia: UDS and Shukri to be obtained, continue gabapentin  6.  Insomnia: OTC melatonin         Follow Up   Return in about 27 days (around 12/18/2024).  Patient was given instructions and counseling regarding his condition or for health maintenance advice. Please see specific information pulled into the AVS if appropriate.

## 2024-12-02 DIAGNOSIS — G62.9 PERIPHERAL POLYNEUROPATHY: ICD-10-CM

## 2024-12-02 DIAGNOSIS — E11.9 DIABETES MELLITUS TYPE 2, INSULIN DEPENDENT: ICD-10-CM

## 2024-12-02 DIAGNOSIS — Z79.4 DIABETES MELLITUS TYPE 2, INSULIN DEPENDENT: ICD-10-CM

## 2024-12-02 NOTE — TELEPHONE ENCOUNTER
Rx Refill Note  Requested Prescriptions     Pending Prescriptions Disp Refills    gabapentin (NEURONTIN) 600 MG tablet 30 tablet 0     Sig: Take 1 tablet by mouth every night at bedtime.      Last office visit with prescribing clinician: 11/21/2024   Last telemedicine visit with prescribing clinician: Visit date not found   Next office visit with prescribing clinician: 12/16/2024                         Would you like a call back once the refill request has been completed: [] Yes [] No    If the office needs to give you a call back, can they leave a voicemail: [] Yes [] No    Jose Francisco Schilling CMA  12/02/24, 12:02 EST

## 2024-12-02 NOTE — TELEPHONE ENCOUNTER
Caller: Tristan Haines    Relationship: Self    Best call back number: [phone]     Requested Prescriptions:   Requested Prescriptions     Pending Prescriptions Disp Refills    gabapentin (NEURONTIN) 600 MG tablet 30 tablet 0     Sig: Take 1 tablet by mouth every night at bedtime.        Pharmacy where request should be sent:      Last office visit with prescribing clinician: 11/21/2024   Last telemedicine visit with prescribing clinician: Visit date not found   Next office visit with prescribing clinician: 12/16/2024         Does the patient have less than a 3 day supply:  [x] Yes  [] No    Would you like a call back once the refill request has been completed: [] Yes [x] No    If the office needs to give you a call back, can they leave a voicemail: [] Yes [x] No    Mariely Nayak Rep   12/02/24 10:19 EST

## 2024-12-03 RX ORDER — GABAPENTIN 600 MG/1
600 TABLET ORAL
Qty: 30 TABLET | Refills: 0 | Status: SHIPPED | OUTPATIENT
Start: 2024-12-03

## 2024-12-14 LAB
ALBUMIN SERPL-MCNC: 3.8 G/DL (ref 3.7–4.7)
ALBUMIN/CREAT UR: 8 MG/G CREAT (ref 0–29)
ALP SERPL-CCNC: 64 IU/L (ref 44–121)
ALT SERPL-CCNC: 9 IU/L (ref 0–44)
AST SERPL-CCNC: 14 IU/L (ref 0–40)
BILIRUB SERPL-MCNC: 0.7 MG/DL (ref 0–1.2)
BUN SERPL-MCNC: 13 MG/DL (ref 8–27)
BUN/CREAT SERPL: 9 (ref 10–24)
CALCIUM SERPL-MCNC: 8.9 MG/DL (ref 8.6–10.2)
CHLORIDE SERPL-SCNC: 105 MMOL/L (ref 96–106)
CHOLEST SERPL-MCNC: 122 MG/DL (ref 100–199)
CO2 SERPL-SCNC: 25 MMOL/L (ref 20–29)
CREAT SERPL-MCNC: 1.48 MG/DL (ref 0.76–1.27)
CREAT UR-MCNC: 179.6 MG/DL
EGFRCR SERPLBLD CKD-EPI 2021: 46 ML/MIN/1.73
ERYTHROCYTE [DISTWIDTH] IN BLOOD BY AUTOMATED COUNT: 12.4 % (ref 11.6–15.4)
GLOBULIN SER CALC-MCNC: 2.4 G/DL (ref 1.5–4.5)
GLUCOSE SERPL-MCNC: 127 MG/DL (ref 70–99)
HBA1C MFR BLD: 7.1 % (ref 4.8–5.6)
HCT VFR BLD AUTO: 40.3 % (ref 37.5–51)
HDLC SERPL-MCNC: 51 MG/DL
HGB BLD-MCNC: 13.1 G/DL (ref 13–17.7)
LDLC SERPL CALC-MCNC: 55 MG/DL (ref 0–99)
MCH RBC QN AUTO: 31 PG (ref 26.6–33)
MCHC RBC AUTO-ENTMCNC: 32.5 G/DL (ref 31.5–35.7)
MCV RBC AUTO: 95 FL (ref 79–97)
MICROALBUMIN UR-MCNC: 14.7 UG/ML
PLATELET # BLD AUTO: 163 X10E3/UL (ref 150–450)
POTASSIUM SERPL-SCNC: 4.1 MMOL/L (ref 3.5–5.2)
PROT SERPL-MCNC: 6.2 G/DL (ref 6–8.5)
RBC # BLD AUTO: 4.23 X10E6/UL (ref 4.14–5.8)
SODIUM SERPL-SCNC: 141 MMOL/L (ref 134–144)
TRIGL SERPL-MCNC: 82 MG/DL (ref 0–149)
VLDLC SERPL CALC-MCNC: 16 MG/DL (ref 5–40)
WBC # BLD AUTO: 7.7 X10E3/UL (ref 3.4–10.8)

## 2024-12-16 ENCOUNTER — OFFICE VISIT (OUTPATIENT)
Dept: INTERNAL MEDICINE | Facility: CLINIC | Age: 84
End: 2024-12-16
Payer: MEDICARE

## 2024-12-16 VITALS
OXYGEN SATURATION: 96 % | RESPIRATION RATE: 18 BRPM | WEIGHT: 192 LBS | SYSTOLIC BLOOD PRESSURE: 138 MMHG | TEMPERATURE: 97.9 F | HEIGHT: 73 IN | DIASTOLIC BLOOD PRESSURE: 70 MMHG | HEART RATE: 59 BPM | BODY MASS INDEX: 25.45 KG/M2

## 2024-12-16 DIAGNOSIS — Z79.4 TYPE 2 DIABETES MELLITUS WITH HYPERGLYCEMIA, WITH LONG-TERM CURRENT USE OF INSULIN: ICD-10-CM

## 2024-12-16 DIAGNOSIS — E11.65 TYPE 2 DIABETES MELLITUS WITH HYPERGLYCEMIA, WITH LONG-TERM CURRENT USE OF INSULIN: ICD-10-CM

## 2024-12-16 DIAGNOSIS — I10 BENIGN ESSENTIAL HYPERTENSION: Primary | ICD-10-CM

## 2024-12-16 DIAGNOSIS — N18.32 STAGE 3B CHRONIC KIDNEY DISEASE: ICD-10-CM

## 2024-12-16 DIAGNOSIS — M79.2 NEURALGIA: ICD-10-CM

## 2024-12-16 DIAGNOSIS — E78.2 MIXED HYPERLIPIDEMIA: ICD-10-CM

## 2024-12-16 DIAGNOSIS — Z51.81 THERAPEUTIC DRUG MONITORING: ICD-10-CM

## 2024-12-16 PROCEDURE — 1160F RVW MEDS BY RX/DR IN RCRD: CPT | Performed by: INTERNAL MEDICINE

## 2024-12-16 PROCEDURE — G2211 COMPLEX E/M VISIT ADD ON: HCPCS | Performed by: INTERNAL MEDICINE

## 2024-12-16 PROCEDURE — 3075F SYST BP GE 130 - 139MM HG: CPT | Performed by: INTERNAL MEDICINE

## 2024-12-16 PROCEDURE — 1159F MED LIST DOCD IN RCRD: CPT | Performed by: INTERNAL MEDICINE

## 2024-12-16 PROCEDURE — 3078F DIAST BP <80 MM HG: CPT | Performed by: INTERNAL MEDICINE

## 2024-12-16 PROCEDURE — 1126F AMNT PAIN NOTED NONE PRSNT: CPT | Performed by: INTERNAL MEDICINE

## 2024-12-16 PROCEDURE — 99214 OFFICE O/P EST MOD 30 MIN: CPT | Performed by: INTERNAL MEDICINE

## 2024-12-16 RX ORDER — GABAPENTIN 300 MG/1
300 CAPSULE ORAL
Qty: 30 CAPSULE | Refills: 4 | Status: SHIPPED | OUTPATIENT
Start: 2024-12-16

## 2024-12-16 RX ORDER — INSULIN ASPART 100 [IU]/ML
4 INJECTION, SOLUTION INTRAVENOUS; SUBCUTANEOUS
Qty: 15 ML | Refills: 3 | Status: SHIPPED | OUTPATIENT
Start: 2024-12-16

## 2024-12-16 RX ORDER — BLOOD-GLUCOSE METER
1 KIT MISCELLANEOUS DAILY
Qty: 1 EACH | Refills: 1 | Status: SHIPPED | OUTPATIENT
Start: 2024-12-16

## 2024-12-16 RX ORDER — ATORVASTATIN CALCIUM 40 MG/1
40 TABLET, FILM COATED ORAL DAILY
Qty: 90 TABLET | Refills: 1 | Status: SHIPPED | OUTPATIENT
Start: 2024-12-16

## 2024-12-16 RX ORDER — LOSARTAN POTASSIUM 100 MG/1
100 TABLET ORAL DAILY
Qty: 90 TABLET | Refills: 1 | Status: SHIPPED | OUTPATIENT
Start: 2024-12-16

## 2024-12-16 RX ORDER — CARVEDILOL 25 MG/1
25 TABLET ORAL 2 TIMES DAILY
Qty: 180 TABLET | Refills: 1 | Status: SHIPPED | OUTPATIENT
Start: 2024-12-16

## 2024-12-16 RX ORDER — INSULIN DEGLUDEC 200 U/ML
20 INJECTION, SOLUTION SUBCUTANEOUS
Qty: 15 ML | Refills: 3 | Status: SHIPPED | OUTPATIENT
Start: 2024-12-16

## 2024-12-16 NOTE — PROGRESS NOTES
"Chief Complaint  Hypertension, Hyperlipidemia, and Diabetes    Subjective        Tristan Haines presents to [facility]  HPI: Patient is here to follow up on the blood pressure  The patient is taking the blood pressure medications as prescribed and has had no side effects. The patient is also here to follow up on the cholesterol and is trying to follow a diet. The patient is also here to follow on sugar and  ckd and had  lab work done . The patient also needs refills on medications .   Hypertension   Pertinent negatives include no chest pain, palpitations or shortness of breath.   Hyperlipidemia   Pertinent negatives include no chest pain or shortness of breath.   Diabetes   Pertinent negatives for hypoglycemia include no dizziness, nervousness/anxiousness or pallor. Pertinent negatives for diabetes include no chest pain, no shortness of breath  Patient is on acei/arb       Objective   Vital Signs:  /70   Pulse 59   Temp 97.9 °F (36.6 °C) (Temporal)   Resp 18   Ht 185.4 cm (73\")   Wt 87.1 kg (192 lb)   SpO2 96%   BMI 25.33 kg/m²   Estimated body mass index is 25.33 kg/m² as calculated from the following:    Height as of this encounter: 185.4 cm (73\").    Weight as of this encounter: 87.1 kg (192 lb).            Physical Exam  Vitals and nursing note reviewed.   Constitutional:       General: He is not in acute distress.     Appearance: Normal appearance. He is not diaphoretic.   HENT:      Head: Normocephalic and atraumatic.      Right Ear: External ear normal.      Left Ear: External ear normal.      Nose: Nose normal.   Eyes:      Extraocular Movements: Extraocular movements intact.      Conjunctiva/sclera: Conjunctivae normal.   Neck:      Trachea: Trachea normal.   Cardiovascular:      Rate and Rhythm: Normal rate and regular rhythm.      Heart sounds: Normal heart sounds.   Pulmonary:      Effort: Pulmonary effort is normal. No respiratory distress.   Abdominal:      " General: Abdomen is flat.   Musculoskeletal:         General: Deformity present.      Cervical back: Neck supple.      Comments: Moves all limbs   Skin:     General: Skin is warm and dry.      Findings: No erythema.   Neurological:      Mental Status: He is alert and oriented to person, place, and time.      Comments: No gross motor or sensory deficits        Result Review :  The following data was reviewed by: Nat Baiers MD on 12/16/2024:  Common labs          4/24/2024    09:53 7/24/2024    17:21 12/13/2024    10:27   Common Labs   Glucose 120   127    BUN 12   13    Creatinine 1.39   1.48    Sodium 141   141    Potassium 4.2   4.1    Chloride 107   105    Calcium 9.4   8.9    Total Protein 6.4   6.2    Albumin 4.2   3.8    Total Bilirubin 0.6   0.7    Alkaline Phosphatase 66   64    AST (SGOT) 14   14    ALT (SGPT) 12   9    WBC 6.38   7.7    Hemoglobin 13.4   13.1    Hematocrit 40.7   40.3    Platelets 197   163    Total Cholesterol 128   122    Triglycerides 103   82    HDL Cholesterol 56   51    LDL Cholesterol  53   55    Hemoglobin A1C 6.50  6.4  7.1    Microalbumin, Urine   14.7                Assessment and Plan   Diagnoses and all orders for this visit:    1. Benign essential hypertension (Primary)  -     losartan (COZAAR) 100 MG tablet; Take 1 tablet by mouth Daily.  Dispense: 90 tablet; Refill: 1  -     carvedilol (COREG) 25 MG tablet; Take 1 tablet by mouth 2 (Two) Times a Day.  Dispense: 180 tablet; Refill: 1    2. Mixed hyperlipidemia  -     CBC (No Diff)  -     Comprehensive Metabolic Panel  -     Lipid Panel  -     atorvastatin (LIPITOR) 40 MG tablet; Take 1 tablet by mouth Daily.  Dispense: 90 tablet; Refill: 1    3. Type 2 diabetes mellitus with hyperglycemia, with long-term current use of insulin  -     glucose monitor monitoring kit; Use 1 each Daily.  Dispense: 1 each; Refill: 1  -     glucose blood test strip; Check sugar 3 times a day  Dispense: 90 each; Refill: 12  -     Lancets 30G  misc; Check sugar 3 times a day  Dispense: 90 each; Refill: 12  -     Hemoglobin A1c  -     Microalbumin / Creatinine Urine Ratio - Urine, Clean Catch  -     Insulin Degludec (Tresiba FlexTouch) 200 UNIT/ML solution pen-injector pen injection; Inject 20 Units under the skin into the appropriate area as directed every night at bedtime.  Dispense: 15 mL; Refill: 3  -     insulin aspart (NovoLOG FlexPen) 100 UNIT/ML solution pen-injector sc pen; Inject 4 Units under the skin into the appropriate area as directed 3 (Three) Times a Day With Meals.  Dispense: 15 mL; Refill: 3    4. Stage 3b chronic kidney disease    5. Neuralgia  -     Compliance Drug Analysis, Ur - Urine, Clean Catch  -     gabapentin (NEURONTIN) 300 MG capsule; Take 1 capsule by mouth every night at bedtime.  Dispense: 30 capsule; Refill: 4    6. Therapeutic drug monitoring  -     Compliance Drug Analysis, Ur - Urine, Clean Catch    Plan:  1.  Benign essential hypertension: Will continue current medication, low-sodium diet advised, Counseled to regularly check BP at home with goal averaging <130/80.   2.mixed hyperlipidemia: Reviewed fasting CMP and lipid panel.  Diet and exercise counseled,  Will continue current medications  3. Diabetes  Mellitus  :   Reviewed fasting CMP  and hba1c   7.1 , diet and exercise counseled , Will change NovoLog 4 units 3 times daily and increase NovoLog by 2 units if needed, will also increase to Tresiba 20 units at bedtime  4.  CKD stage IIIb: Oral hydration, monitor BMP, seen by nephrology in the past  5. Neuralgia: Refilled gabapentin 3 mg at bedtime, Leighann reviewed and obtained           Follow Up   Return in about 4 weeks (around 1/13/2025).  Patient was given instructions and counseling regarding his condition or for health maintenance advice. Please see specific information pulled into the AVS if appropriate.

## 2024-12-24 LAB — DRUGS UR: NORMAL

## 2025-01-22 ENCOUNTER — OFFICE VISIT (OUTPATIENT)
Dept: INTERNAL MEDICINE | Facility: CLINIC | Age: 85
End: 2025-01-22
Payer: MEDICARE

## 2025-01-22 VITALS
HEART RATE: 71 BPM | RESPIRATION RATE: 16 BRPM | TEMPERATURE: 97.6 F | OXYGEN SATURATION: 97 % | SYSTOLIC BLOOD PRESSURE: 123 MMHG | DIASTOLIC BLOOD PRESSURE: 70 MMHG | BODY MASS INDEX: 25.23 KG/M2 | HEIGHT: 73 IN | WEIGHT: 190.4 LBS

## 2025-01-22 DIAGNOSIS — E78.2 MIXED HYPERLIPIDEMIA: ICD-10-CM

## 2025-01-22 DIAGNOSIS — Z79.4 TYPE 2 DIABETES MELLITUS WITH HYPERGLYCEMIA, WITH LONG-TERM CURRENT USE OF INSULIN: ICD-10-CM

## 2025-01-22 DIAGNOSIS — N18.32 STAGE 3B CHRONIC KIDNEY DISEASE: ICD-10-CM

## 2025-01-22 DIAGNOSIS — E11.65 TYPE 2 DIABETES MELLITUS WITH HYPERGLYCEMIA, WITH LONG-TERM CURRENT USE OF INSULIN: ICD-10-CM

## 2025-01-22 DIAGNOSIS — I10 BENIGN ESSENTIAL HYPERTENSION: Primary | ICD-10-CM

## 2025-01-22 PROCEDURE — 3078F DIAST BP <80 MM HG: CPT | Performed by: INTERNAL MEDICINE

## 2025-01-22 PROCEDURE — 3074F SYST BP LT 130 MM HG: CPT | Performed by: INTERNAL MEDICINE

## 2025-01-22 PROCEDURE — G2211 COMPLEX E/M VISIT ADD ON: HCPCS | Performed by: INTERNAL MEDICINE

## 2025-01-22 PROCEDURE — 99214 OFFICE O/P EST MOD 30 MIN: CPT | Performed by: INTERNAL MEDICINE

## 2025-01-22 PROCEDURE — 1159F MED LIST DOCD IN RCRD: CPT | Performed by: INTERNAL MEDICINE

## 2025-01-22 PROCEDURE — 1126F AMNT PAIN NOTED NONE PRSNT: CPT | Performed by: INTERNAL MEDICINE

## 2025-01-22 PROCEDURE — 1160F RVW MEDS BY RX/DR IN RCRD: CPT | Performed by: INTERNAL MEDICINE

## 2025-01-22 RX ORDER — PEN NEEDLE, DIABETIC 32GX 5/32"
NEEDLE, DISPOSABLE MISCELLANEOUS
Qty: 200 EACH | Refills: 11 | Status: SHIPPED | OUTPATIENT
Start: 2025-01-22

## 2025-01-22 RX ORDER — INSULIN DEGLUDEC 200 U/ML
30 INJECTION, SOLUTION SUBCUTANEOUS
Qty: 15 ML | Refills: 3 | Status: SHIPPED | OUTPATIENT
Start: 2025-01-22

## 2025-01-22 RX ORDER — CHOLECALCIFEROL (VITAMIN D3) 25 MCG
1000 TABLET ORAL DAILY
COMMUNITY

## 2025-01-22 RX ORDER — LANOLIN ALCOHOL/MO/W.PET/CERES
1000 CREAM (GRAM) TOPICAL DAILY
COMMUNITY

## 2025-01-22 NOTE — PROGRESS NOTES
"Chief Complaint  Hypertension, Hyperlipidemia, and Diabetes    Subjective        Tristan Haines presents to [facility]  HPI: Patient is here to follow up on the blood pressure  The patient is taking the blood pressure medications as prescribed and has had no side effects. The patient is also here to follow up on the cholesterol and is trying to follow a diet. The patient is also here to follow on sugar and  ckd and had  lab work done .    Hypertension   Pertinent negatives include no chest pain, palpitations or shortness of breath.   Hyperlipidemia   Pertinent negatives include no chest pain or shortness of breath.   Diabetes   Pertinent negatives for hypoglycemia include no dizziness, nervousness/anxiousness or pallor. Pertinent negatives for diabetes include no chest pain, no shortness of breath  Patient is on acei/arb       Objective   Vital Signs:  /70   Pulse 71   Temp 97.6 °F (36.4 °C) (Temporal)   Resp 16   Ht 185.4 cm (73\")   Wt 86.4 kg (190 lb 6.4 oz)   SpO2 97%   BMI 25.12 kg/m²   Estimated body mass index is 25.12 kg/m² as calculated from the following:    Height as of this encounter: 185.4 cm (73\").    Weight as of this encounter: 86.4 kg (190 lb 6.4 oz).    BMI is >= 25 and <30. (Overweight) The following options were offered after discussion;: weight loss educational material (shared in after visit summary), exercise counseling/recommendations, and nutrition counseling/recommendations      Physical Exam  Vitals and nursing note reviewed.   Constitutional:       General: He is not in acute distress.     Appearance: Normal appearance. He is not diaphoretic.   HENT:      Head: Normocephalic and atraumatic.      Right Ear: External ear normal.      Left Ear: External ear normal.      Nose: Nose normal.   Eyes:      Extraocular Movements: Extraocular movements intact.      Conjunctiva/sclera: Conjunctivae normal.   Neck:      Trachea: Trachea normal. "   Cardiovascular:      Rate and Rhythm: Normal rate and regular rhythm.      Heart sounds: Normal heart sounds.   Pulmonary:      Effort: Pulmonary effort is normal. No respiratory distress.   Abdominal:      General: Abdomen is flat.   Musculoskeletal:         General: Deformity present.      Cervical back: Neck supple.      Comments: Moves all limbs   Skin:     General: Skin is warm and dry.      Findings: No erythema.   Neurological:      Mental Status: He is alert and oriented to person, place, and time.      Comments: No gross motor or sensory deficits        Result Review :  The following data was reviewed by: Nat Baires MD on 01/22/2025:  Common labs          7/24/2024    17:21 12/13/2024    10:27 1/22/2025    11:37   Common Labs   Glucose  127  139    BUN  13  12    Creatinine  1.48  1.56    Sodium  141  139    Potassium  4.1  4.1    Chloride  105  105    Calcium  8.9  9.6    Total Protein  6.2     Albumin  3.8     Total Bilirubin  0.7     Alkaline Phosphatase  64     AST (SGOT)  14     ALT (SGPT)  9     WBC  7.7     Hemoglobin  13.1     Hematocrit  40.3     Platelets  163     Total Cholesterol  122     Triglycerides  82     HDL Cholesterol  51     LDL Cholesterol   55     Hemoglobin A1C 6.4  7.1     Microalbumin, Urine  14.7                 Assessment and Plan   Diagnoses and all orders for this visit:    1. Benign essential hypertension (Primary)    2. Mixed hyperlipidemia    3. Type 2 diabetes mellitus with hyperglycemia, with long-term current use of insulin  -     Insulin Pen Needle (BD Pen Needle Rimma 2nd Gen) 32G X 4 MM misc; Use with insulin 4 times a day  Dispense: 200 each; Refill: 11  -     Insulin Degludec (Tresiba FlexTouch) 200 UNIT/ML solution pen-injector pen injection; Inject 30 Units under the skin into the appropriate area as directed every night at bedtime.  Dispense: 15 mL; Refill: 3    4. Stage 3b chronic kidney disease  -     Basic Metabolic Panel       Plan:  1.  Benign essential  hypertension: Will continue current medication, low-sodium diet advised, Counseled to regularly check BP at home with goal averaging <130/80.   2.mixed hyperlipidemia:   reviewed  fasting CMP and lipid panel.  Diet and exercise counseled,  Will continue current medications  3. Diabetes  Mellitus  :  reviewed  fasting CMP  and hba1c  7.1 , diet and exercise counseled , Will continue current medications -  novolog and increase  to  tresiba 30 u qhs   4.  Ckd stage 3 b : oral hydration , monitor bmp                Follow Up   Return in about 3 months (around 4/21/2025).  Patient was given instructions and counseling regarding his condition or for health maintenance advice. Please see specific information pulled into the AVS if appropriate.

## 2025-01-23 LAB
BUN SERPL-MCNC: 12 MG/DL (ref 8–23)
BUN/CREAT SERPL: 7.7 (ref 7–25)
CALCIUM SERPL-MCNC: 9.6 MG/DL (ref 8.6–10.5)
CHLORIDE SERPL-SCNC: 105 MMOL/L (ref 98–107)
CO2 SERPL-SCNC: 26.9 MMOL/L (ref 22–29)
CREAT SERPL-MCNC: 1.56 MG/DL (ref 0.76–1.27)
EGFRCR SERPLBLD CKD-EPI 2021: 43.3 ML/MIN/1.73
GLUCOSE SERPL-MCNC: 139 MG/DL (ref 65–99)
POTASSIUM SERPL-SCNC: 4.1 MMOL/L (ref 3.5–5.2)
SODIUM SERPL-SCNC: 139 MMOL/L (ref 136–145)

## 2025-02-06 ENCOUNTER — OFFICE VISIT (OUTPATIENT)
Dept: INTERNAL MEDICINE | Facility: CLINIC | Age: 85
End: 2025-02-06
Payer: MEDICARE

## 2025-02-06 VITALS
HEIGHT: 73 IN | BODY MASS INDEX: 24.65 KG/M2 | HEART RATE: 56 BPM | WEIGHT: 186 LBS | TEMPERATURE: 98.8 F | RESPIRATION RATE: 16 BRPM | OXYGEN SATURATION: 95 % | DIASTOLIC BLOOD PRESSURE: 59 MMHG | SYSTOLIC BLOOD PRESSURE: 108 MMHG

## 2025-02-06 DIAGNOSIS — N18.31 STAGE 3A CHRONIC KIDNEY DISEASE: ICD-10-CM

## 2025-02-06 DIAGNOSIS — I10 BENIGN ESSENTIAL HYPERTENSION: Primary | ICD-10-CM

## 2025-02-06 DIAGNOSIS — E11.65 TYPE 2 DIABETES MELLITUS WITH HYPERGLYCEMIA, WITH LONG-TERM CURRENT USE OF INSULIN: ICD-10-CM

## 2025-02-06 DIAGNOSIS — I25.10 CORONARY ARTERY DISEASE INVOLVING NATIVE CORONARY ARTERY OF NATIVE HEART WITHOUT ANGINA PECTORIS: ICD-10-CM

## 2025-02-06 DIAGNOSIS — Z79.4 TYPE 2 DIABETES MELLITUS WITH HYPERGLYCEMIA, WITH LONG-TERM CURRENT USE OF INSULIN: ICD-10-CM

## 2025-02-06 RX ORDER — AMLODIPINE BESYLATE 5 MG/1
2.5 TABLET ORAL DAILY
COMMUNITY
Start: 2025-02-04 | End: 2026-02-04

## 2025-02-06 NOTE — PROGRESS NOTES
"Chief Complaint  Hypertension ( ), Diabetes, and Chronic Kidney Disease    Subjective        Tristan Haines presents to [facility]  HPI: Patient is here to follow up on the blood pressure  The patient is taking the blood pressure medications as prescribed , he was seen by cardiology with addition of amlodipine to help his blood pressure, he is also scheduled for a stress echo, patient is also following up on  sugar  and ckd  and   had  lab work done .  The patient also needs refills on medications .   Diabetes   Pertinent negatives include no chest pain or shortness of breath.   Hypertension   Pertinent negatives include no chest pain, palpitations or shortness of breath.      Objective   Vital Signs:  /59   Pulse 56   Temp 98.8 °F (37.1 °C) (Infrared)   Resp 16   Ht 185.4 cm (72.99\")   Wt 84.4 kg (186 lb)   SpO2 95%   BMI 24.55 kg/m²   Estimated body mass index is 24.55 kg/m² as calculated from the following:    Height as of this encounter: 185.4 cm (72.99\").    Weight as of this encounter: 84.4 kg (186 lb).    BMI is within normal parameters. No other follow-up for BMI required.      Physical Exam  Vitals and nursing note reviewed.   Constitutional:       General: He is not in acute distress.     Appearance: Normal appearance. He is not diaphoretic.   HENT:      Head: Normocephalic and atraumatic.      Right Ear: External ear normal.      Left Ear: External ear normal.      Nose: Nose normal.   Eyes:      Extraocular Movements: Extraocular movements intact.      Conjunctiva/sclera: Conjunctivae normal.   Neck:      Trachea: Trachea normal.   Cardiovascular:      Rate and Rhythm: Normal rate and regular rhythm.      Heart sounds: Normal heart sounds.   Pulmonary:      Effort: Pulmonary effort is normal. No respiratory distress.   Abdominal:      General: Abdomen is flat.   Musculoskeletal:         General: Deformity present.      Cervical back: Neck supple.      Comments: " Moves all limbs   Skin:     General: Skin is warm and dry.      Findings: No erythema.   Neurological:      Mental Status: He is alert and oriented to person, place, and time.      Comments: No gross motor or sensory deficits        Result Review :  The following data was reviewed by: Nat Baires MD on 02/06/2025:  CMP          12/13/2024    10:27 1/22/2025    11:37 2/6/2025    14:48   CMP   Glucose 127  139  78    BUN 13  12  16    Creatinine 1.48  1.56  1.58    Sodium 141  139  140    Potassium 4.1  4.1  4.2    Chloride 105  105  105    Calcium 8.9  9.6  9.1    Total Protein 6.2      Albumin 3.8      Globulin 2.4      Total Bilirubin 0.7      Alkaline Phosphatase 64      AST (SGOT) 14      ALT (SGPT) 9      BUN/Creatinine Ratio 9  7.7  10.1                Assessment and Plan   Diagnoses and all orders for this visit:    1. Benign essential hypertension (Primary)    2. Type 2 diabetes mellitus with hyperglycemia, with long-term current use of insulin  -     insulin aspart (NovoLOG FlexPen) 100 UNIT/ML solution pen-injector sc pen; Inject 4 Units under the skin into the appropriate area as directed 3 (Three) Times a Day With Meals.  Dispense: 15 mL; Refill: 3    3. Coronary artery disease involving native coronary artery of native heart without angina pectoris    4. Stage 3a chronic kidney disease  -     Basic Metabolic Panel    Plan:  1.  Benign essential hypertension: Will continue current medication, low-sodium diet advised, Counseled to regularly check BP at home with goal averaging <130/80.   2.  Diabetes  Mellitus  :  reviewed  fasting CMP  and hba1c   7.1 , diet and exercise counseled , Will continue current medications  3. Cad : Seen by cardiology, has stress echo scheduled  4. Ckd Stage 3a : Oral hydration, monitor BMP           Follow Up     Patient was given instructions and counseling regarding his condition or for health maintenance advice. Please see specific information pulled into the AVS if  appropriate.

## 2025-02-07 LAB
BUN SERPL-MCNC: 16 MG/DL (ref 8–23)
BUN/CREAT SERPL: 10.1 (ref 7–25)
CALCIUM SERPL-MCNC: 9.1 MG/DL (ref 8.6–10.5)
CHLORIDE SERPL-SCNC: 105 MMOL/L (ref 98–107)
CO2 SERPL-SCNC: 26.9 MMOL/L (ref 22–29)
CREAT SERPL-MCNC: 1.58 MG/DL (ref 0.76–1.27)
EGFRCR SERPLBLD CKD-EPI 2021: 42.6 ML/MIN/1.73
GLUCOSE SERPL-MCNC: 78 MG/DL (ref 65–99)
POTASSIUM SERPL-SCNC: 4.2 MMOL/L (ref 3.5–5.2)
SODIUM SERPL-SCNC: 140 MMOL/L (ref 136–145)

## 2025-02-08 RX ORDER — INSULIN ASPART 100 [IU]/ML
4 INJECTION, SOLUTION INTRAVENOUS; SUBCUTANEOUS
Qty: 15 ML | Refills: 3 | Status: SHIPPED | OUTPATIENT
Start: 2025-02-08

## 2025-03-11 DIAGNOSIS — K21.9 GASTROESOPHAGEAL REFLUX DISEASE WITHOUT ESOPHAGITIS: ICD-10-CM

## 2025-03-11 RX ORDER — LANSOPRAZOLE 30 MG/1
30 CAPSULE, DELAYED RELEASE ORAL DAILY
Qty: 90 CAPSULE | Refills: 0 | Status: SHIPPED | OUTPATIENT
Start: 2025-03-11

## 2025-03-12 ENCOUNTER — OFFICE VISIT (OUTPATIENT)
Dept: INTERNAL MEDICINE | Facility: CLINIC | Age: 85
End: 2025-03-12
Payer: MEDICARE

## 2025-03-12 VITALS
WEIGHT: 183.12 LBS | SYSTOLIC BLOOD PRESSURE: 111 MMHG | TEMPERATURE: 98.2 F | DIASTOLIC BLOOD PRESSURE: 63 MMHG | HEIGHT: 73 IN | OXYGEN SATURATION: 98 % | RESPIRATION RATE: 20 BRPM | HEART RATE: 69 BPM | BODY MASS INDEX: 24.27 KG/M2

## 2025-03-12 DIAGNOSIS — J01.01 ACUTE RECURRENT MAXILLARY SINUSITIS: ICD-10-CM

## 2025-03-12 DIAGNOSIS — J20.9 ACUTE BRONCHITIS, UNSPECIFIED ORGANISM: ICD-10-CM

## 2025-03-12 DIAGNOSIS — J06.0 ACUTE LARYNGOPHARYNGITIS: ICD-10-CM

## 2025-03-12 DIAGNOSIS — H66.003 ACUTE SUPPURATIVE OTITIS MEDIA OF BOTH EARS WITHOUT SPONTANEOUS RUPTURE OF TYMPANIC MEMBRANES, RECURRENCE NOT SPECIFIED: ICD-10-CM

## 2025-03-12 DIAGNOSIS — R05.1 ACUTE COUGH: ICD-10-CM

## 2025-03-12 DIAGNOSIS — J02.9 SORE THROAT: ICD-10-CM

## 2025-03-12 DIAGNOSIS — I10 BENIGN ESSENTIAL HYPERTENSION: Primary | ICD-10-CM

## 2025-03-12 RX ORDER — CEFDINIR 300 MG/1
300 CAPSULE ORAL 2 TIMES DAILY
Qty: 20 CAPSULE | Refills: 0 | Status: SHIPPED | OUTPATIENT
Start: 2025-03-12 | End: 2025-03-22

## 2025-03-12 NOTE — PROGRESS NOTES
"Chief Complaint  Cough (Started about 11 days ago), Nasal Congestion, Sore Throat, and Hypertension    Subjective        Tristan Haines presents to [facility]    HPI: Patient is here to follow up on the blood pressure  The patient is taking the blood pressure medications as prescribed and has had no side effects. The patient also complains of ear pain , nasal discharge , sore throat  And sinus congestion since the past few days, patient has been trying over the counter medications with not much relief in the symptoms , patient also complains of cough with yellow  green sputum and  denies a fever  .    Objective   Vital Signs:  /63   Pulse 69   Temp 98.2 °F (36.8 °C) (Temporal)   Resp 20   Ht 185.4 cm (73\")   Wt 83.1 kg (183 lb 1.9 oz)   SpO2 98%   BMI 24.16 kg/m²   Estimated body mass index is 24.16 kg/m² as calculated from the following:    Height as of this encounter: 185.4 cm (73\").    Weight as of this encounter: 83.1 kg (183 lb 1.9 oz).    BMI is within normal parameters. No other follow-up for BMI required.      Physical Exam  Vitals and nursing note reviewed.   Constitutional:       General: He is not in acute distress.     Appearance: Normal appearance. He is not diaphoretic.   HENT:      Head: Normocephalic and atraumatic.      Right Ear: External ear normal. Tympanic membrane is erythematous.      Left Ear: External ear normal. Tympanic membrane is erythematous.      Nose: Rhinorrhea present.      Right Sinus: Maxillary sinus tenderness present.      Left Sinus: Maxillary sinus tenderness present.   Eyes:      Extraocular Movements: Extraocular movements intact.      Conjunctiva/sclera: Conjunctivae normal.   Neck:      Trachea: Trachea normal.   Cardiovascular:      Rate and Rhythm: Normal rate and regular rhythm.      Heart sounds: Normal heart sounds.   Pulmonary:      Effort: Pulmonary effort is normal. No respiratory distress.   Abdominal:      General: Abdomen " is flat.   Musculoskeletal:      Cervical back: Neck supple.      Comments: Moves all limbs   Skin:     General: Skin is warm and dry.      Findings: No erythema.   Neurological:      Mental Status: He is alert and oriented to person, place, and time.      Comments: No gross motor or sensory deficits        Result Review :  The following data was reviewed by: Nat Baires MD on 03/12/2025:              Assessment and Plan   Diagnoses and all orders for this visit:    1. Benign essential hypertension (Primary)    2. Acute bronchitis, unspecified organism  -     cefdinir (OMNICEF) 300 MG capsule; Take 1 capsule by mouth 2 (Two) Times a Day for 10 days.  Dispense: 20 capsule; Refill: 0    3. Acute recurrent maxillary sinusitis  -     cefdinir (OMNICEF) 300 MG capsule; Take 1 capsule by mouth 2 (Two) Times a Day for 10 days.  Dispense: 20 capsule; Refill: 0    4. Acute suppurative otitis media of both ears without spontaneous rupture of tympanic membranes, recurrence not specified  -     cefdinir (OMNICEF) 300 MG capsule; Take 1 capsule by mouth 2 (Two) Times a Day for 10 days.  Dispense: 20 capsule; Refill: 0    5. Acute laryngopharyngitis  -     cefdinir (OMNICEF) 300 MG capsule; Take 1 capsule by mouth 2 (Two) Times a Day for 10 days.  Dispense: 20 capsule; Refill: 0    6. Acute cough  -     POCT SARS-CoV-2 + Flu Antigen MIK    7. Sore throat  -     POCT rapid strep A     Plan :   1.Benign Essential Hypertension: will  continue current medications, low sodium diet, patient to continue to monitor  blood pressure  2 .acute bronchitis: We will start oral antibiotics ,   3.Acute suppurative otitis media: will   start oral antibiotics    4.Acute maxillary sinusitis:   will   start oral antibiotics   5. Acute laryngopharyngitis:  will   Start  oral antibiotics  6. Cough : in office flu , covid and strep test negative , otc cough medications  7.  Sore throat: In office strep test negative, OTC medications       Follow Up      Patient was given instructions and counseling regarding his condition or for health maintenance advice. Please see specific information pulled into the AVS if appropriate.

## 2025-03-13 LAB
EXPIRATION DATE: NORMAL
EXPIRATION DATE: NORMAL
FLUAV AG UPPER RESP QL IA.RAPID: NOT DETECTED
FLUBV AG UPPER RESP QL IA.RAPID: NOT DETECTED
INTERNAL CONTROL: NORMAL
INTERNAL CONTROL: NORMAL
Lab: NORMAL
Lab: NORMAL
S PYO AG THROAT QL: NEGATIVE
SARS-COV-2 AG UPPER RESP QL IA.RAPID: NOT DETECTED

## 2025-04-01 DIAGNOSIS — Z79.4 TYPE 2 DIABETES MELLITUS WITH HYPERGLYCEMIA, WITH LONG-TERM CURRENT USE OF INSULIN: ICD-10-CM

## 2025-04-01 DIAGNOSIS — E11.65 TYPE 2 DIABETES MELLITUS WITH HYPERGLYCEMIA, WITH LONG-TERM CURRENT USE OF INSULIN: ICD-10-CM

## 2025-04-01 RX ORDER — PEN NEEDLE, DIABETIC 32GX 5/32"
NEEDLE, DISPOSABLE MISCELLANEOUS
Qty: 200 EACH | Refills: 11 | Status: SHIPPED | OUTPATIENT
Start: 2025-04-01 | End: 2025-04-01 | Stop reason: SDUPTHER

## 2025-04-01 RX ORDER — PEN NEEDLE, DIABETIC 32GX 5/32"
NEEDLE, DISPOSABLE MISCELLANEOUS
Qty: 200 EACH | Refills: 11 | Status: SHIPPED | OUTPATIENT
Start: 2025-04-01

## 2025-04-01 NOTE — TELEPHONE ENCOUNTER
Caller: Tristan Haines    Relationship: Self    Best call back number: [phone]     Requested Prescriptions:   Requested Prescriptions     Pending Prescriptions Disp Refills    Insulin Pen Needle (BD Pen Needle Rimma 2nd Gen) 32G X 4 MM misc 200 each 11     Sig: Use with insulin 4 times a day    glucose blood test strip 90 each 12     Sig: Check sugar 3 times a day        Pharmacy where request should be sent: [facility] [address] [place] [address] - [address] [facility]  - [phone] [facility] [phone] FX     Last office visit with prescribing clinician: 3/12/2025   Last telemedicine visit with prescribing clinician: Visit date not found   Next office visit with prescribing clinician: 4/21/2025     Additional details provided by patient: PLEASE SEND REFILLS. TEST STRIPS ARE FOR ONE TOUCH VERIO FLEX    Does the patient have less than a 3 day supply:  [x] Yes  [] No    Would you like a call back once the refill request has been completed: [] Yes [x] No    If the office needs to give you a call back, can they leave a voicemail: [x] Yes [] No    Mariely Avila Rep   04/01/25 15:14 EDT

## 2025-04-01 NOTE — TELEPHONE ENCOUNTER
Caller: Tristan Haines    Relationship: Self    Best call back number: [phone]     What is the best time to reach you: ANY    Who are you requesting to speak with (clinical staff, provider,  specific staff member): NURSE    Do you know the name of the person who called: PATIENT    What was the call regarding: PATIENT HAS STYE AND WOULD LIKE MEDICATION FOR THIS.      PHARMACY:  [facility]    Is it okay if the provider responds through MyChart: PHONE CALL PLEASE

## 2025-04-01 NOTE — TELEPHONE ENCOUNTER
Caller: Tristan Haines    Relationship: Self    Best call back number: [phone]     What is the best time to reach you: ANY    Who are you requesting to speak with (clinical staff, provider,  specific staff member): CLINICAL    Do you know the name of the person who called: HAS NOT HEARD BACK AND PHARMACY HAS NOT RECEIVED THE NEW PRESCRIPTION    What was the call regarding: CHECKING THE STATUS OF THE NEW MEDICATION REQUEST FOR A STYE ON HIS RIGHT EYE. THIS HAS BEEN THERE FOR 3 DAYS. THIS IS SWOLLEN TO THE POINT OF IT EFFECTING HIS VISION.    PHARMACY: [id number] - [place], [place] - [address] [facility]  - [phone]  - [phone] FX     Is it okay if the provider responds through [employer]: NO, PLEASE CALL.

## 2025-04-02 ENCOUNTER — OFFICE VISIT (OUTPATIENT)
Dept: INTERNAL MEDICINE | Facility: CLINIC | Age: 85
End: 2025-04-02
Payer: MEDICARE

## 2025-04-02 VITALS
DIASTOLIC BLOOD PRESSURE: 74 MMHG | BODY MASS INDEX: 24.78 KG/M2 | HEART RATE: 62 BPM | RESPIRATION RATE: 16 BRPM | OXYGEN SATURATION: 96 % | SYSTOLIC BLOOD PRESSURE: 150 MMHG | TEMPERATURE: 97.7 F | WEIGHT: 187 LBS | HEIGHT: 73 IN

## 2025-04-02 DIAGNOSIS — H00.011 HORDEOLUM EXTERNUM OF RIGHT UPPER EYELID: ICD-10-CM

## 2025-04-02 DIAGNOSIS — I10 BENIGN ESSENTIAL HYPERTENSION: Primary | ICD-10-CM

## 2025-04-02 RX ORDER — GENTAMICIN SULFATE 3 MG/ML
1 SOLUTION/ DROPS OPHTHALMIC 3 TIMES DAILY
Qty: 5 ML | Refills: 0 | Status: SHIPPED | OUTPATIENT
Start: 2025-04-02

## 2025-04-02 NOTE — PROGRESS NOTES
"Chief Complaint  Stye (Right eye stye. Started last week.) and Hypertension    Subjective        Tristan Haines presents to [facility]  HPI: Patient is here to follow up on the blood pressure  The patient is taking the blood pressure medications as prescribed and has had no side effects. The patient is also here complaining of right upper eyelid stye which started last week and is now improving and it has decreased in size  Hypertension   Pertinent negatives include no chest pain, palpitations or shortness of breath.    Objective   Vital Signs:  /74   Pulse 62   Temp 97.7 °F (36.5 °C) (Temporal)   Resp 16   Ht 185.4 cm (73\")   Wt 84.8 kg (187 lb)   SpO2 96%   BMI 24.67 kg/m²   Estimated body mass index is 24.67 kg/m² as calculated from the following:    Height as of this encounter: 185.4 cm (73\").    Weight as of this encounter: 84.8 kg (187 lb).    BMI is within normal parameters. No other follow-up for BMI required.      Physical Exam  Vitals and nursing note reviewed.   Constitutional:       General: He is not in acute distress.     Appearance: Normal appearance. He is not diaphoretic.   HENT:      Head: Normocephalic and atraumatic.      Right Ear: External ear normal.      Left Ear: External ear normal.      Nose: Nose normal.   Eyes:      Extraocular Movements: Extraocular movements intact.      Conjunctiva/sclera: Conjunctivae normal.        Comments: Right stye upper eyelid   Neck:      Trachea: Trachea normal.   Cardiovascular:      Rate and Rhythm: Normal rate and regular rhythm.      Heart sounds: Normal heart sounds.   Pulmonary:      Effort: Pulmonary effort is normal. No respiratory distress.   Abdominal:      General: Abdomen is flat.   Musculoskeletal:      Cervical back: Neck supple.      Comments: Moves all limbs   Skin:     General: Skin is warm and dry.      Findings: No erythema.   Neurological:      Mental Status: He is alert and oriented to person, " place, and time.      Comments: No gross motor or sensory deficits        Result Review :  The following data was reviewed by: Nat Baires MD on 04/02/2025:              Assessment and Plan   Diagnoses and all orders for this visit:    1. Benign essential hypertension (Primary)    2. Hordeolum externum of right upper eyelid  -     gentamicin (GARAMYCIN) 0.3 % ophthalmic solution; Administer 1 drop to the right eye 3 (Three) Times a Day.  Dispense: 5 mL; Refill: 0    Plan:  1.  Benign essential hypertension: Will continue current medication, low-sodium diet advised, Counseled to regularly check BP at home with goal averaging <130/80.   2.  Right upper eyelid hordeolum externum: Gentamicin eyedrops prescribed, keep area clean         Follow Up     Patient was given instructions and counseling regarding his condition or for health maintenance advice. Please see specific information pulled into the AVS if appropriate.

## 2025-04-05 DIAGNOSIS — I10 BENIGN ESSENTIAL HYPERTENSION: ICD-10-CM

## 2025-04-05 RX ORDER — LOSARTAN POTASSIUM 100 MG/1
100 TABLET ORAL DAILY
Qty: 90 TABLET | Refills: 1 | Status: SHIPPED | OUTPATIENT
Start: 2025-04-05

## 2025-04-17 LAB
ALBUMIN SERPL-MCNC: 3.9 G/DL (ref 3.5–5.2)
ALBUMIN/CREAT UR: 8 MG/G CREAT (ref 0–29)
ALBUMIN/GLOB SERPL: 1.6 G/DL
ALP SERPL-CCNC: 64 U/L (ref 39–117)
ALT SERPL-CCNC: 11 U/L (ref 1–41)
AST SERPL-CCNC: 19 U/L (ref 1–40)
BILIRUB SERPL-MCNC: 0.7 MG/DL (ref 0–1.2)
BUN SERPL-MCNC: 11 MG/DL (ref 8–23)
BUN/CREAT SERPL: 7.9 (ref 7–25)
CALCIUM SERPL-MCNC: 9.1 MG/DL (ref 8.6–10.5)
CHLORIDE SERPL-SCNC: 107 MMOL/L (ref 98–107)
CHOLEST SERPL-MCNC: 134 MG/DL (ref 0–200)
CO2 SERPL-SCNC: 28 MMOL/L (ref 22–29)
CREAT SERPL-MCNC: 1.4 MG/DL (ref 0.76–1.27)
CREAT UR-MCNC: 107.5 MG/DL
EGFRCR SERPLBLD CKD-EPI 2021: 49.3 ML/MIN/1.73
ERYTHROCYTE [DISTWIDTH] IN BLOOD BY AUTOMATED COUNT: 13.2 % (ref 12.3–15.4)
GLOBULIN SER CALC-MCNC: 2.4 GM/DL
GLUCOSE SERPL-MCNC: 103 MG/DL (ref 65–99)
HBA1C MFR BLD: 7.2 % (ref 4.8–5.6)
HCT VFR BLD AUTO: 40.9 % (ref 37.5–51)
HDLC SERPL-MCNC: 56 MG/DL (ref 40–60)
HGB BLD-MCNC: 13.3 G/DL (ref 13–17.7)
LDLC SERPL CALC-MCNC: 64 MG/DL (ref 0–100)
MCH RBC QN AUTO: 30.3 PG (ref 26.6–33)
MCHC RBC AUTO-ENTMCNC: 32.5 G/DL (ref 31.5–35.7)
MCV RBC AUTO: 93.2 FL (ref 79–97)
MICROALBUMIN UR-MCNC: 8.6 UG/ML
PLATELET # BLD AUTO: 172 10*3/MM3 (ref 140–450)
POTASSIUM SERPL-SCNC: 4.1 MMOL/L (ref 3.5–5.2)
PROT SERPL-MCNC: 6.3 G/DL (ref 6–8.5)
RBC # BLD AUTO: 4.39 10*6/MM3 (ref 4.14–5.8)
SODIUM SERPL-SCNC: 143 MMOL/L (ref 136–145)
TRIGL SERPL-MCNC: 68 MG/DL (ref 0–150)
VLDLC SERPL CALC-MCNC: 14 MG/DL (ref 5–40)
WBC # BLD AUTO: 8.58 10*3/MM3 (ref 3.4–10.8)

## 2025-04-21 ENCOUNTER — OFFICE VISIT (OUTPATIENT)
Dept: INTERNAL MEDICINE | Facility: CLINIC | Age: 85
End: 2025-04-21
Payer: MEDICARE

## 2025-04-21 VITALS
HEART RATE: 54 BPM | BODY MASS INDEX: 25.18 KG/M2 | OXYGEN SATURATION: 95 % | DIASTOLIC BLOOD PRESSURE: 52 MMHG | WEIGHT: 190 LBS | RESPIRATION RATE: 18 BRPM | TEMPERATURE: 97.6 F | HEIGHT: 73 IN | SYSTOLIC BLOOD PRESSURE: 105 MMHG

## 2025-04-21 DIAGNOSIS — Z79.4 TYPE 2 DIABETES MELLITUS WITH HYPERGLYCEMIA, WITH LONG-TERM CURRENT USE OF INSULIN: ICD-10-CM

## 2025-04-21 DIAGNOSIS — E11.65 TYPE 2 DIABETES MELLITUS WITH HYPERGLYCEMIA, WITH LONG-TERM CURRENT USE OF INSULIN: ICD-10-CM

## 2025-04-21 DIAGNOSIS — I10 BENIGN ESSENTIAL HYPERTENSION: ICD-10-CM

## 2025-04-21 DIAGNOSIS — N18.31 STAGE 3A CHRONIC KIDNEY DISEASE: ICD-10-CM

## 2025-04-21 DIAGNOSIS — Z00.00 MEDICARE ANNUAL WELLNESS VISIT, SUBSEQUENT: Primary | ICD-10-CM

## 2025-04-21 DIAGNOSIS — E78.2 MIXED HYPERLIPIDEMIA: ICD-10-CM

## 2025-04-21 NOTE — PROGRESS NOTES
Subjective   The ABCs of the Annual Wellness Visit  Medicare Wellness Visit    Chief Complaint   Patient presents with    Medicare Wellness-subsequent       Tristan Haines is a 85 y.o. patient who presents for a Medicare Wellness Visit.    The following portions of the patient's history were reviewed and   updated as appropriate: allergies, current medications, past family history, past medical history, past social history, past surgical history, and problem list.    Compared to one year ago, the patient's physical   health is worse.  Compared to one year ago, the patient's mental   health is better.    Recent Hospitalizations:  He was not admitted to the hospital during the last year.     Current Medical Providers:  Patient Care Team:  Nat Baires MD as PCP - General (Internal Medicine)  Vicky Perez MD as Consulting Physician (General Surgery)  Maksim Peguero MD as Consulting Physician (Gastroenterology)  Abdirizak Fink MD (Pain Medicine)  Lyssa Tovar APRN (Cardiology)  Edmar Macario MD, JETT as Consulting Physician (Nephrology)  Zack Anderson MD (Ophthalmology)    Outpatient Medications Prior to Visit   Medication Sig Dispense Refill    aspirin 81 MG tablet Take 1 tablet by mouth Daily.      atorvastatin (LIPITOR) 40 MG tablet Take 1 tablet by mouth Daily. 90 tablet 1    betamethasone dipropionate 0.05 % cream APPLY TOPICALLY TO APPROPRIATE AREA AS DIRECTED TWICE DAILY 45 g 0    carvedilol (COREG) 25 MG tablet Take 1 tablet by mouth 2 (Two) Times a Day. 180 tablet 1    Cholecalciferol 25 MCG (1000 UT) tablet Take 1 tablet by mouth Daily.      gabapentin (NEURONTIN) 300 MG capsule Take 1 capsule by mouth every night at bedtime. 30 capsule 4    gentamicin (GARAMYCIN) 0.3 % ophthalmic solution Administer 1 drop to the right eye 3 (Three) Times a Day. 5 mL 0    glucose blood test strip Check sugar 3 times a day 90 each 12    glucose monitor monitoring kit Use 1 each Daily. 1 each 1     insulin aspart (NovoLOG FlexPen) 100 UNIT/ML solution pen-injector sc pen Inject 4 Units under the skin into the appropriate area as directed 3 (Three) Times a Day With Meals. 15 mL 3    Insulin Degludec (Tresiba FlexTouch) 200 UNIT/ML solution pen-injector pen injection Inject 30 Units under the skin into the appropriate area as directed every night at bedtime. 15 mL 3    Insulin Pen Needle (BD Pen Needle Rimma 2nd Gen) 32G X 4 MM misc Use with insulin 4 times a day 200 each 11    Lancets 30G misc Check sugar 3 times a day 90 each 12    lansoprazole (PREVACID) 30 MG capsule TAKE 1 CAPSULE BY MOUTH EVERY DAY 90 capsule 0    losartan (COZAAR) 100 MG tablet TAKE 1 TABLET BY MOUTH EVERY DAY 90 tablet 1    melatonin 1 MG tablet Take  by mouth.      nitroglycerin (Nitrostat) 0.4 MG SL tablet Place 1 tablet under the tongue Every 5 (Five) Minutes As Needed for Chest Pain. Take no more than 3 doses in 15 minutes. 50 tablet 12    Probiotic Product (PROBIOTIC DAILY PO) Take 1 tablet by mouth Daily.      vitamin B-12 (CYANOCOBALAMIN) 1000 MCG tablet Take 1 tablet by mouth Daily.      amLODIPine (NORVASC) 5 MG tablet Take 0.5 tablets by mouth Daily.       No facility-administered medications prior to visit.     No opioid medication identified on active medication list. I have reviewed chart for other potential  high risk medication/s and harmful drug interactions in the elderly.      Aspirin is on active medication list. Aspirin use is indicated based on review of current medical condition/s. Pros and cons of this therapy have been discussed today. Benefits of this medication outweigh potential harm.  Patient has been encouraged to continue taking this medication.  .      Patient Active Problem List   Diagnosis    Colon polyp    Edema of lower extremity    Gastroesophageal reflux disease    Hyperlipidemia    Hypertension    Insomnia    Lumbar radiculopathy    Calculus of kidney    Peripheral polyneuropathy    Malignant neoplasm  "of prostate    Inflammation of sacroiliac joint    Vitamin D deficiency    Hearing problem of both ears    CAD (coronary artery disease)    Inguinal hernia    Chronic hydrocele    Diabetes mellitus type 2, insulin dependent    Scoliosis of thoracolumbar spine (S/P Surgery w/ hardware, S/P Epidural pain pump)    Fatty liver    Skin cancer    Urinary incontinence    Hx of CABG    Terminal ileitis without complication    Stage 4 chronic kidney disease    Fall     Advance Care Planning Advance Directive is not on file.  ACP discussion was held with the patient during this visit. Patient has an advance directive (not in EMR), copy requested.            Objective   Vitals:    04/21/25 1221   BP: 105/52   Pulse: 54   Resp: 18   Temp: 97.6 °F (36.4 °C)   TempSrc: Temporal   SpO2: 95%   Weight: 86.2 kg (190 lb)   Height: 185.4 cm (73\")   PainSc: 0-No pain       Estimated body mass index is 25.07 kg/m² as calculated from the following:    Height as of this encounter: 185.4 cm (73\").    Weight as of this encounter: 86.2 kg (190 lb).                Does the patient have evidence of cognitive impairment? No  Lab Results   Component Value Date    CHLPL 134 04/16/2025    TRIG 68 04/16/2025    HDL 56 04/16/2025    LDL 64 04/16/2025    VLDL 14 04/16/2025    HGBA1C 7.20 (H) 04/16/2025                                                                                                Health  Risk Assessment    Smoking Status:  Social History     Tobacco Use   Smoking Status Never   Smokeless Tobacco Never     Alcohol Consumption:  Social History     Substance and Sexual Activity   Alcohol Use Not Currently    Comment: Hx wine use in past       Fall Risk Screen  STEADI Fall Risk Assessment was completed, and patient is at LOW risk for falls.Assessment completed on:4/21/2025    Depression Screening   Little interest or pleasure in doing things? Not at all   Feeling down, depressed, or hopeless? Not at all   PHQ-2 Total Score 0      Health " Habits and Functional and Cognitive Screenin/21/2025    12:22 PM   Functional & Cognitive Status   Do you have difficulty preparing food and eating? No   Do you have difficulty bathing yourself, getting dressed or grooming yourself? No   Do you have difficulty using the toilet? No   Do you have difficulty moving around from place to place? No   Do you have trouble with steps or getting out of a bed or a chair? No   Current Diet Diabetic Diet   Dental Exam Up to date   Eye Exam Up to date   Exercise (times per week) 2 times per week   Current Exercises Include Treadmill   Do you need help using the phone?  No   Are you deaf or do you have serious difficulty hearing?  No   Do you need help to go to places out of walking distance? No   Do you need help shopping? No   Do you need help preparing meals?  No   Do you need help with housework?  No   Do you need help with laundry? No   Do you need help taking your medications? No   Do you need help managing money? No   Do you ever drive or ride in a car without wearing a seat belt? No   Have you felt unusual stress, anger or loneliness in the last month? No   Who do you live with? Spouse   If you need help, do you have trouble finding someone available to you? No   Have you been bothered in the last four weeks by sexual problems? No   Do you have difficulty concentrating, remembering or making decisions? No           Age-appropriate Screening Schedule:  Refer to the list below for future screening recommendations based on patient's age, sex and/or medical conditions. Orders for these recommended tests are listed in the plan section. The patient has been provided with a written plan.    Health Maintenance List  Health Maintenance   Topic Date Due    DIABETIC FOOT EXAM  2024    ZOSTER VACCINE (2 of 2) 2025 (Originally 11/10/2015)    COVID-19 Vaccine ( - -25 season) 2025 (Originally 2024)    RSV Vaccine - Adults (1 - 1-dose 75+ series)  04/21/2026 (Originally 1/11/2015)    TDAP/TD VACCINES (1 - Tdap) 08/05/2026 (Originally 1/11/1959)    INFLUENZA VACCINE  07/01/2025    HEMOGLOBIN A1C  10/16/2025    DIABETIC EYE EXAM  11/19/2025    LIPID PANEL  04/16/2026    URINE MICROALBUMIN-CREATININE RATIO (uACR)  04/16/2026    ANNUAL WELLNESS VISIT  04/21/2026    COLORECTAL CANCER SCREENING  03/29/2031    Pneumococcal Vaccine 50+  Completed                                                                                                                                                CMS Preventative Services Quick Reference  Risk Factors Identified During Encounter  Fall Risk-High or Moderate: Discussed Fall Prevention in the home, Information on Fall Prevention Shared in After Visit Summary, and Sit to Stand Exercise Information Shared in After Visit Summary  Dental Screening Recommended  Vision Screening Recommended    The above risks/problems have been discussed with the patient.  Pertinent information has been shared with the patient in the After Visit Summary.  An After Visit Summary and PPPS were made available to the patient.    Follow Up:   Next Medicare Wellness visit to be scheduled in 1 year.         Additional E&M Note during same encounter follows:  Patient has additional, significant, and separately identifiable condition(s)/problem(s) that require work above and beyond the Medicare Wellness Visit     Chief Complaint  Medicare Wellness-subsequent    Subjective   HPI  Tristan is also being seen today for an annual adult preventative physical exam.  and Tristan is also being seen today for additional medical problem/s.   Patient is here to follow up on the blood pressure  The patient is taking the blood pressure medications as prescribed and has had no side effects. The patient is also here to follow up on the cholesterol and is trying to follow a diet. The patient is also here to follow on sugar and ckd and had  lab work done .   Hypertension   Pertinent  "negatives include no chest pain, palpitations or shortness of breath.   Hyperlipidemia   Pertinent negatives include no chest pain or shortness of breath.   Diabetes   Pertinent negatives for hypoglycemia include no dizziness, nervousness/anxiousness or pallor. Pertinent negatives for diabetes include no chest pain, no shortness of breath  Patient is on acei/arb                   Objective   Vital Signs:  /52   Pulse 54   Temp 97.6 °F (36.4 °C) (Temporal)   Resp 18   Ht 185.4 cm (73\")   Wt 86.2 kg (190 lb)   SpO2 95%   BMI 25.07 kg/m²   Physical Exam  Vitals and nursing note reviewed.   Constitutional:       General: He is not in acute distress.     Appearance: Normal appearance. He is not diaphoretic.   HENT:      Head: Normocephalic and atraumatic.      Right Ear: External ear normal.      Left Ear: External ear normal.      Nose: Nose normal.   Eyes:      Extraocular Movements: Extraocular movements intact.      Conjunctiva/sclera: Conjunctivae normal.   Neck:      Trachea: Trachea normal.   Cardiovascular:      Rate and Rhythm: Normal rate and regular rhythm.      Heart sounds: Normal heart sounds.   Pulmonary:      Effort: Pulmonary effort is normal. No respiratory distress.   Abdominal:      General: Abdomen is flat.   Musculoskeletal:      Cervical back: Neck supple.      Comments: Moves all limbs   Skin:     General: Skin is warm and dry.      Findings: No erythema.   Neurological:      Mental Status: He is alert and oriented to person, place, and time.      Comments: No gross motor or sensory deficits       The following data was reviewed by: Nat Baires MD on 04/21/2025:    Common labs          1/22/2025    11:37 2/6/2025    14:48 4/16/2025    08:52   Common Labs   Glucose 139  78  103    BUN 12  16  11    Creatinine 1.56  1.58  1.40    Sodium 139  140  143    Potassium 4.1  4.2  4.1    Chloride 105  105  107    Calcium 9.6  9.1  9.1    Albumin   3.9    Total Bilirubin   0.7    Alkaline " Phosphatase   64    AST (SGOT)   19    ALT (SGPT)   11    WBC   8.58    Hemoglobin   13.3    Hematocrit   40.9    Platelets   172    Total Cholesterol   134    Triglycerides   68    HDL Cholesterol   56    LDL Cholesterol    64    Hemoglobin A1C   7.20    Microalbumin, Urine   8.6           Assessment and Plan Additional age appropriate preventative wellness advice topics were discussed during today's preventative wellness exam(some topics already addressed during AWV portion of the note above):    Physical Activity: Advised cardiovascular activity 150 minutes per week as tolerated. (example brisk walk for 30 minutes, 5 days a week).     Nutrition: Discussed nutrition plan with patient. Information shared in after visit summary. Goal is for a well balanced diet to enhance overall health.     Healthy Weight: Discussed current and goal BMI with patient. Steps to attain this goal discussed. Information shared in after visit summary.     Medicare annual wellness visit, subsequent  Plan:  1.physical: Physical exam conducted today, reviewed fasting lab work,   Impression: healthy adult Patient. Currently, patient eats a healthy diet. Screening lab work includes glucose, lipid profile and complete blood count . The risks and benefits of immunizations were discussed and immunizations are up to date. Advice and education were given regarding nutrition and aerobic exercise. Patient discussion: discussed with the patient.  Annual Wellness Visit  with preventive exam as well as age and risk appropriate counseling completed.   Advance Directive Planning: paperwork and instructions were given to the patient.   Patient Discussion: plan discussed with the patient, follow-up visit needed in one year. Medication Review and medication list updated        Benign essential hypertension  Hypertension is stable and controlled, running low today , oral hydration, monitor vitals  Continue current treatment regimen.  Dietary sodium  restriction.  Blood pressure will be reassessed in 6 months.         Type 2 diabetes mellitus with hyperglycemia, with long-term current use of insulin  Diabetes is stable.   Continue current treatment regimen.  Recommended an ADA diet.  Regular aerobic exercise.  Discussed foot care.  Reminded to get yearly retinal exam.  Diabetes will be reassessed in 3 months    Orders:    Hemoglobin A1c    Microalbumin / Creatinine Urine Ratio - Urine, Clean Catch    Mixed hyperlipidemia   Lipid abnormalities are stable    Plan:  Continue same medication/s without change.      Discussed medication dosage, use, side effects, and goals of treatment in detail.    Counseled patient on lifestyle modifications to help control hyperlipidemia.   Cholesterol lowering dietary information shared with patient.    Patient Treatment Goals:   LDL goal is less than 70    Followup in 6 months.    Orders:    CBC (No Diff)    Comprehensive Metabolic Panel    Lipid Panel    Stage 3a chronic kidney disease  Renal condition is stable.  Continue current treatment regimen.  Sodium restriction  Renal condition will be reassessed in 3 months.  Oral hydration               Follow Up   Return in about 4 weeks (around 5/19/2025).  Patient was given instructions and counseling regarding his condition or for health maintenance advice. Please see specific information pulled into the AVS if appropriate.

## 2025-04-21 NOTE — PATIENT INSTRUCTIONS
Advance Care Planning and Advance Directives     You make decisions on a daily basis - decisions about where you want to live, your career, your home, your life. Perhaps one of the most important decisions you face is your choice for future medical care. Take time to talk with your family and your healthcare team and start planning today.  Advance Care Planning is a process that can help you:  Understand possible future healthcare decisions in light of your own experiences  Reflect on those decision in light of your goals and values  Discuss your decisions with those closest to you and the healthcare professionals that care for you  Make a plan by creating a document that reflects your wishes    Surrogate Decision Maker  In the event of a medical emergency, which has left you unable to communicate or to make your own decisions, you would need someone to make decisions for you.  It is important to discuss your preferences for medical treatment with this person while you are in good health.     Qualities of a surrogate decision maker:  Willing to take on this role and responsibility  Knows what you want for future medical care  Willing to follow your wishes even if they don't agree with them  Able to make difficult medical decisions under stressful circumstances    Advance Directives  These are legal documents you can create that will guide your healthcare team and decision maker(s) when needed. These documents can be stored in the electronic medical record.    Living Will - a legal document to guide your care if you have a terminal condition or a serious illness and are unable to communicate. States vary by statute in document names/types, but most forms may include one or more of the following:        -  Directions regarding life-prolonging treatments        -  Directions regarding artificially provided nutrition/hydration        -  Choosing a healthcare decision maker        -  Direction regarding organ/tissue  donation    Durable Power of  for Healthcare - this document names an -in-fact to make medical decisions for you, but it may also allow this person to make personal and financial decisions for you. Please seek the advice of an  if you need this type of document.    **Advance Directives are not required and no one may discriminate against you if you do not sign one.    Medical Orders  Many states allow specific forms/orders signed by your physician to record your wishes for medical treatment in your current state of health. This form, signed in personal communication with your physician, addresses resuscitation and other medical interventions that you may or may not want.      For more information or to schedule a time with a [facility] Advance Care Planning Facilitator contact: [facility] or call [phone] and someone will contact you directly.    Medicare Wellness  Personal Prevention Plan of Service     Date of Office Visit:    Encounter Provider:  Nat Baires MD  Place of Service:  [facility]  Patient Name: Tristan Haines  :  1940    As part of the Medicare Wellness portion of your visit today, we are providing you with this personalized preventive plan of services (PPPS). This plan is based upon recommendations of the [place] Preventive Services Task Force (USPSTF) and the Advisory Committee on Immunization Practices (ACIP).    This lists the preventive care services that should be considered, and provides dates of when you are due. Items listed as completed are up-to-date and do not require any further intervention.    Health Maintenance   Topic Date Due   • ANNUAL WELLNESS VISIT  2025   • ZOSTER VACCINE (2 of 2) 2025 (Originally 11/10/2015)   • COVID-19 Vaccine ( - -25 season) 2025 (Originally 2024)   • RSV Vaccine - Adults (1 - 1-dose 75+ series) 2026 (Originally 2015)   • TDAP/TD  VACCINES (1 - Tdap) 08/05/2026 (Originally 1/11/1959)   • INFLUENZA VACCINE  07/01/2025   • HEMOGLOBIN A1C  10/16/2025   • DIABETIC EYE EXAM  11/19/2025   • LIPID PANEL  04/16/2026   • URINE MICROALBUMIN-CREATININE RATIO (uACR)  04/16/2026   • COLORECTAL CANCER SCREENING  03/29/2031   • Pneumococcal Vaccine 50+  Completed       No orders of the defined types were placed in this encounter.      No follow-ups on file.

## 2025-04-28 ENCOUNTER — TELEPHONE (OUTPATIENT)
Dept: INTERNAL MEDICINE | Facility: CLINIC | Age: 85
End: 2025-04-28

## 2025-04-28 NOTE — TELEPHONE ENCOUNTER
Caller: Tristan Haines    Relationship: Self    Best call back number: [phone]     Which medication are you concerned about: JARDIANCE 25    When did you start taking this medication: PATIENT HAS NOT TAKEN YET    What are your concerns: PATIENT WANTS TO KNOW WHAT THIS MEDICATION IS FOR? AND WANTS TO DISCUSS THE BENEFITS, IF ANY. PLEASE CALL AND ADVISE.

## 2025-05-13 ENCOUNTER — OFFICE VISIT (OUTPATIENT)
Dept: INTERNAL MEDICINE | Facility: CLINIC | Age: 85
End: 2025-05-13
Payer: MEDICARE

## 2025-05-13 VITALS
TEMPERATURE: 97.8 F | WEIGHT: 189 LBS | HEART RATE: 56 BPM | SYSTOLIC BLOOD PRESSURE: 126 MMHG | HEIGHT: 73 IN | DIASTOLIC BLOOD PRESSURE: 56 MMHG | OXYGEN SATURATION: 99 % | RESPIRATION RATE: 18 BRPM | BODY MASS INDEX: 25.05 KG/M2

## 2025-05-13 DIAGNOSIS — Z79.4 TYPE 2 DIABETES MELLITUS WITH HYPERGLYCEMIA, WITH LONG-TERM CURRENT USE OF INSULIN: ICD-10-CM

## 2025-05-13 DIAGNOSIS — I10 BENIGN ESSENTIAL HYPERTENSION: Primary | ICD-10-CM

## 2025-05-13 DIAGNOSIS — M79.2 NEURALGIA: ICD-10-CM

## 2025-05-13 DIAGNOSIS — N18.31 STAGE 3A CHRONIC KIDNEY DISEASE: ICD-10-CM

## 2025-05-13 DIAGNOSIS — E11.65 TYPE 2 DIABETES MELLITUS WITH HYPERGLYCEMIA, WITH LONG-TERM CURRENT USE OF INSULIN: ICD-10-CM

## 2025-05-13 DIAGNOSIS — Z51.81 THERAPEUTIC DRUG MONITORING: ICD-10-CM

## 2025-05-13 RX ORDER — GABAPENTIN 300 MG/1
300 CAPSULE ORAL
Qty: 30 CAPSULE | Refills: 4 | Status: SHIPPED | OUTPATIENT
Start: 2025-05-13

## 2025-05-13 NOTE — PROGRESS NOTES
"Chief Complaint  Hypertension, Diabetes, and Chronic Kidney Disease    Subjective        Tristan Haines presents to [facility]  History of Present Illness  HPI: Patient is here to follow up on the blood pressure  The patient is taking the blood pressure medications as prescribed and has had no side effects. The patient is also here to follow up on the   sugar and CKD and lab work done .  He was started on Jardiance at the last visit and has been able to tolerate it well, and states that his blood sugar readings have improved, the patient also complains of neuralgia and needs refills on medications .   Hypertension   Pertinent negatives include no chest pain, palpitations or shortness of breath.    Objective   Vital Signs:  /56   Pulse 56   Temp 97.8 °F (36.6 °C) (Infrared)   Resp 18   Ht 185.4 cm (73\")   Wt 85.7 kg (189 lb)   SpO2 99%   BMI 24.94 kg/m²   Estimated body mass index is 24.94 kg/m² as calculated from the following:    Height as of this encounter: 185.4 cm (73\").    Weight as of this encounter: 85.7 kg (189 lb).    BMI is within normal parameters. No other follow-up for BMI required.      Physical Exam  Vitals and nursing note reviewed.   Constitutional:       General: He is not in acute distress.     Appearance: Normal appearance. He is not diaphoretic.   HENT:      Head: Normocephalic and atraumatic.      Right Ear: External ear normal.      Left Ear: External ear normal.      Nose: Nose normal.   Eyes:      Extraocular Movements: Extraocular movements intact.      Conjunctiva/sclera: Conjunctivae normal.   Neck:      Trachea: Trachea normal.   Cardiovascular:      Rate and Rhythm: Normal rate and regular rhythm.      Heart sounds: Normal heart sounds.   Pulmonary:      Effort: Pulmonary effort is normal. No respiratory distress.   Abdominal:      General: Abdomen is flat.   Musculoskeletal:         General: Deformity present.      Cervical back: Neck supple.    "   Comments: Moves all limbs   Skin:     General: Skin is warm and dry.      Findings: No erythema.   Neurological:      Mental Status: He is alert and oriented to person, place, and time.      Comments: No gross motor or sensory deficits        Result Review :  The following data was reviewed by: Nat Baires MD on 05/13/2025:  Common labs          1/22/2025    11:37 2/6/2025    14:48 4/16/2025    08:52   Common Labs   Glucose 139  78  103    BUN 12  16  11    Creatinine 1.56  1.58  1.40    Sodium 139  140  143    Potassium 4.1  4.2  4.1    Chloride 105  105  107    Calcium 9.6  9.1  9.1    Albumin   3.9    Total Bilirubin   0.7    Alkaline Phosphatase   64    AST (SGOT)   19    ALT (SGPT)   11    WBC   8.58    Hemoglobin   13.3    Hematocrit   40.9    Platelets   172    Total Cholesterol   134    Triglycerides   68    HDL Cholesterol   56    LDL Cholesterol    64    Hemoglobin A1C   7.20    Microalbumin, Urine   8.6                Assessment and Plan   Diagnoses and all orders for this visit:    1. Benign essential hypertension (Primary)    2. Type 2 diabetes mellitus with hyperglycemia, with long-term current use of insulin    3. Stage 3a chronic kidney disease    4. Neuralgia  -     Compliance Drug Analysis, Ur - Urine, Clean Catch  -     gabapentin (NEURONTIN) 300 MG capsule; Take 1 capsule by mouth every night at bedtime.  Dispense: 30 capsule; Refill: 4    5. Therapeutic drug monitoring  -     Compliance Drug Analysis, Ur - Urine, Clean Catch      Plan:  1.  Benign essential hypertension: Will continue current medication, low-sodium diet advised, Counseled to regularly check BP at home with goal averaging <130/80.   2. Diabetes  Mellitus  :   reviewed  fasting CMP  and hba1c 7.2  , diet and exercise counseled , Will continue current medications  3. Ckd stage 3 a : Labs reviewed, will monitor  4.  Neuralgia: PRATEEK and Shukri reviewed and obtained and refill gabapentin       Follow Up   No follow-ups on  file.  Patient was given instructions and counseling regarding his condition or for health maintenance advice. Please see specific information pulled into the AVS if appropriate.

## 2025-05-18 LAB — DRUGS UR: NORMAL

## 2025-06-08 DIAGNOSIS — K21.9 GASTROESOPHAGEAL REFLUX DISEASE WITHOUT ESOPHAGITIS: ICD-10-CM

## 2025-06-09 NOTE — TELEPHONE ENCOUNTER
Rx Refill Note  Requested Prescriptions     Pending Prescriptions Disp Refills    lansoprazole (PREVACID) 30 MG capsule [Pharmacy Med Name: Lansoprazole Oral Capsule Delayed Release 30 MG] 90 capsule 3     Sig: TAKE 1 CAPSULE BY MOUTH EVERY DAY      Last office visit with prescribing clinician: 5/13/2025   Last telemedicine visit with prescribing clinician: Visit date not found   Next office visit with prescribing clinician: 7/21/2025                         Miracle Lagunas MA  06/09/25, 10:12 EDT

## 2025-06-11 ENCOUNTER — TELEPHONE (OUTPATIENT)
Dept: INTERNAL MEDICINE | Facility: CLINIC | Age: 85
End: 2025-06-11
Payer: MEDICARE

## 2025-06-11 NOTE — TELEPHONE ENCOUNTER
Caller: Za Haines    Relationship: Emergency Contact    Best call back number:  [phone]    Requested Prescriptions:   Requested Prescriptions      No prescriptions requested or ordered in this encounter      lansoprazole (PREVACID) 30 MG capsule     Pharmacy where request should be sent: [id number] - [place], [place] - [address] - [phone]  - [phone] FX     Last office visit with prescribing clinician: 5/13/2025   Last telemedicine visit with prescribing clinician: Visit date not found   Next office visit with prescribing clinician: 7/21/2025     Does the patient have less than a 3 day supply:  [] Yes  [x] No    Would you like a call back once the refill request has been completed: [] Yes [x] No    If the office needs to give you a call back, can they leave a voicemail: [] Yes [x] No    Charity Lovelace, PCT   06/11/25 15:32 EDT

## 2025-06-12 DIAGNOSIS — K21.9 GASTROESOPHAGEAL REFLUX DISEASE WITHOUT ESOPHAGITIS: ICD-10-CM

## 2025-06-12 RX ORDER — LANSOPRAZOLE 30 MG/1
30 CAPSULE, DELAYED RELEASE ORAL DAILY
Qty: 90 CAPSULE | Refills: 0 | Status: SHIPPED | OUTPATIENT
Start: 2025-06-12

## 2025-06-12 RX ORDER — LANSOPRAZOLE 30 MG/1
30 CAPSULE, DELAYED RELEASE ORAL DAILY
Qty: 90 CAPSULE | Refills: 3 | OUTPATIENT
Start: 2025-06-12

## 2025-06-12 NOTE — TELEPHONE ENCOUNTER
Rx Refill Note  Requested Prescriptions      No prescriptions requested or ordered in this encounter      Last office visit with prescribing clinician: 5/13/2025   Last telemedicine visit with prescribing clinician: Visit date not found   Next office visit with prescribing clinician: 7/21/2025                         Would you like a call back once the refill request has been completed: [] Yes [] No    If the office needs to give you a call back, can they leave a voicemail: [] Yes [] No    Jose Francisco Schilling, ROMAN  06/12/25, 09:29 EDT

## 2025-06-16 DIAGNOSIS — K21.9 GASTROESOPHAGEAL REFLUX DISEASE WITHOUT ESOPHAGITIS: ICD-10-CM

## 2025-06-16 NOTE — TELEPHONE ENCOUNTER
Caller: Tristan Haines    Relationship: Self    Best call back number: [phone]     Requested Prescriptions:   Requested Prescriptions     Pending Prescriptions Disp Refills    lansoprazole (PREVACID) 30 MG capsule 90 capsule 0     Sig: Take 1 capsule by mouth Daily.        Pharmacy where request should be sent: [id number] - [place], [place] - [address] - [phone] [facility] [phone] FX     Last office visit with prescribing clinician: 5/13/2025   Last telemedicine visit with prescribing clinician: Visit date not found   Next office visit with prescribing clinician: 7/21/2025     Additional details provided by patient: PATIENT SPOKE TO PHARMACY TODAY AND WAS TOLD THEY NEVER RECEIVED THE RX, BUT COMPUTER SHOWS CONFIRMATION ON 6/13/25. PATIENT REQUEST WE RESEND TO THE PHARMACY    Does the patient have less than a 3 day supply:  [x] Yes  [] No    Would you like a call back once the refill request has been completed: [x] Yes [] No    If the office needs to give you a call back, can they leave a voicemail: [x] Yes [] No    Mariely Nayak Rep   06/16/25 09:50 EDT

## 2025-06-17 RX ORDER — LANSOPRAZOLE 30 MG/1
30 CAPSULE, DELAYED RELEASE ORAL DAILY
Qty: 90 CAPSULE | Refills: 0 | Status: SHIPPED | OUTPATIENT
Start: 2025-06-17

## 2025-07-21 ENCOUNTER — OFFICE VISIT (OUTPATIENT)
Dept: INTERNAL MEDICINE | Facility: CLINIC | Age: 85
End: 2025-07-21
Payer: MEDICARE

## 2025-07-21 VITALS
BODY MASS INDEX: 24.65 KG/M2 | RESPIRATION RATE: 18 BRPM | WEIGHT: 186 LBS | DIASTOLIC BLOOD PRESSURE: 62 MMHG | SYSTOLIC BLOOD PRESSURE: 102 MMHG | OXYGEN SATURATION: 96 % | HEART RATE: 60 BPM | HEIGHT: 73 IN | TEMPERATURE: 97.6 F

## 2025-07-21 DIAGNOSIS — M79.2 NEURALGIA: ICD-10-CM

## 2025-07-21 DIAGNOSIS — Z79.4 TYPE 2 DIABETES MELLITUS WITH HYPERGLYCEMIA, WITH LONG-TERM CURRENT USE OF INSULIN: ICD-10-CM

## 2025-07-21 DIAGNOSIS — E11.65 TYPE 2 DIABETES MELLITUS WITH HYPERGLYCEMIA, WITH LONG-TERM CURRENT USE OF INSULIN: ICD-10-CM

## 2025-07-21 DIAGNOSIS — Z51.81 THERAPEUTIC DRUG MONITORING: ICD-10-CM

## 2025-07-21 DIAGNOSIS — N18.31 STAGE 3A CHRONIC KIDNEY DISEASE: ICD-10-CM

## 2025-07-21 DIAGNOSIS — I10 BENIGN ESSENTIAL HYPERTENSION: Primary | ICD-10-CM

## 2025-07-21 DIAGNOSIS — E78.2 MIXED HYPERLIPIDEMIA: ICD-10-CM

## 2025-07-21 PROCEDURE — 3074F SYST BP LT 130 MM HG: CPT | Performed by: INTERNAL MEDICINE

## 2025-07-21 PROCEDURE — G2211 COMPLEX E/M VISIT ADD ON: HCPCS | Performed by: INTERNAL MEDICINE

## 2025-07-21 PROCEDURE — 1159F MED LIST DOCD IN RCRD: CPT | Performed by: INTERNAL MEDICINE

## 2025-07-21 PROCEDURE — 99214 OFFICE O/P EST MOD 30 MIN: CPT | Performed by: INTERNAL MEDICINE

## 2025-07-21 PROCEDURE — 3078F DIAST BP <80 MM HG: CPT | Performed by: INTERNAL MEDICINE

## 2025-07-21 PROCEDURE — 1160F RVW MEDS BY RX/DR IN RCRD: CPT | Performed by: INTERNAL MEDICINE

## 2025-07-21 PROCEDURE — 1126F AMNT PAIN NOTED NONE PRSNT: CPT | Performed by: INTERNAL MEDICINE

## 2025-07-21 RX ORDER — ALCOHOL ANTISEPTIC PADS
PADS, MEDICATED (EA) TOPICAL
Qty: 400 EACH | Refills: 12 | Status: SHIPPED | OUTPATIENT
Start: 2025-07-21

## 2025-07-21 RX ORDER — INSULIN ASPART 100 [IU]/ML
4 INJECTION, SOLUTION INTRAVENOUS; SUBCUTANEOUS
Qty: 15 ML | Refills: 3 | Status: SHIPPED | OUTPATIENT
Start: 2025-07-21

## 2025-07-21 RX ORDER — GABAPENTIN 300 MG/1
300 CAPSULE ORAL
Qty: 30 CAPSULE | Refills: 4 | Status: SHIPPED | OUTPATIENT
Start: 2025-07-21

## 2025-07-21 RX ORDER — CARVEDILOL 25 MG/1
25 TABLET ORAL 2 TIMES DAILY
Qty: 180 TABLET | Refills: 1 | Status: SHIPPED | OUTPATIENT
Start: 2025-07-21

## 2025-07-21 RX ORDER — LOSARTAN POTASSIUM 50 MG/1
50 TABLET ORAL 2 TIMES DAILY
Qty: 180 TABLET | Refills: 1 | Status: SHIPPED | OUTPATIENT
Start: 2025-07-21

## 2025-07-21 RX ORDER — KETOCONAZOLE 20 MG/ML
SHAMPOO, SUSPENSION TOPICAL
COMMUNITY

## 2025-07-21 RX ORDER — INSULIN DEGLUDEC 200 U/ML
30 INJECTION, SOLUTION SUBCUTANEOUS
Qty: 15 ML | Refills: 3 | Status: SHIPPED | OUTPATIENT
Start: 2025-07-21

## 2025-07-21 RX ORDER — AMLODIPINE BESYLATE 5 MG/1
2.5 TABLET ORAL DAILY
COMMUNITY

## 2025-07-21 RX ORDER — ATORVASTATIN CALCIUM 40 MG/1
40 TABLET, FILM COATED ORAL DAILY
Qty: 90 TABLET | Refills: 1 | Status: SHIPPED | OUTPATIENT
Start: 2025-07-21

## 2025-07-21 NOTE — PROGRESS NOTES
"Chief Complaint  Hypertension, Diabetes, Hyperlipidemia, and Chronic Kidney Disease    Subjective        Tristan Haines presents to [facility]  HPI: Patient is here to follow up on the blood pressure which running on the lower side, he is taking the blood pressure medications as prescribed and was recently seen by cardiology. The patient is also here to follow up on the cholesterol and is trying to follow a diet. The patient is also here to follow on sugar and ckd and is  due to get lab work done . The patient also complains of neuralgia and needs refills on medications .   Hypertension   Pertinent negatives include no chest pain, palpitations or shortness of breath.   Hyperlipidemia   Pertinent negatives include no chest pain or shortness of breath.   Diabetes   Pertinent negatives for hypoglycemia include no dizziness, nervousness/anxiousness or pallor. Pertinent negatives for diabetes include no chest pain, no shortness of breath  Patient is on acei/arb       Objective   Vital Signs:  /62   Pulse 60   Temp 97.6 °F (36.4 °C) (Infrared)   Resp 18   Ht 185.4 cm (72.99\")   Wt 84.4 kg (186 lb)   SpO2 96%   BMI 24.55 kg/m²   Estimated body mass index is 24.55 kg/m² as calculated from the following:    Height as of this encounter: 185.4 cm (72.99\").    Weight as of this encounter: 84.4 kg (186 lb).    BMI is within normal parameters. No other follow-up for BMI required.  Vitals:    07/21/25 1119 07/21/25 1143   BP: 92/53 102/62   Pulse: 60    Resp: 18    Temp: 97.6 °F (36.4 °C)    TempSrc: Infrared    SpO2: 96%    Weight: 84.4 kg (186 lb)    Height: 185.4 cm (72.99\")          Physical Exam  Vitals and nursing note reviewed.   Constitutional:       General: He is not in acute distress.     Appearance: Normal appearance. He is not diaphoretic.   HENT:      Head: Normocephalic and atraumatic.      Right Ear: External ear normal.      Left Ear: External ear normal.      Nose: Nose " normal.   Eyes:      Extraocular Movements: Extraocular movements intact.      Conjunctiva/sclera: Conjunctivae normal.   Neck:      Trachea: Trachea normal.   Cardiovascular:      Rate and Rhythm: Normal rate and regular rhythm.      Heart sounds: Normal heart sounds.   Pulmonary:      Effort: Pulmonary effort is normal. No respiratory distress.   Abdominal:      General: Abdomen is flat.   Musculoskeletal:      Cervical back: Neck supple.      Comments: Moves all limbs   Skin:     General: Skin is warm and dry.      Findings: No erythema.   Neurological:      Mental Status: He is alert and oriented to person, place, and time.      Comments: No gross motor or sensory deficits        Result Review :  The following data was reviewed by: Nat Baires MD on 07/21/2025:  Common labs          1/22/2025    11:37 2/6/2025    14:48 4/16/2025    08:52   Common Labs   Glucose 139  78  103    BUN 12  16  11    Creatinine 1.56  1.58  1.40    Sodium 139  140  143    Potassium 4.1  4.2  4.1    Chloride 105  105  107    Calcium 9.6  9.1  9.1    Albumin   3.9    Total Bilirubin   0.7    Alkaline Phosphatase   64    AST (SGOT)   19    ALT (SGPT)   11    WBC   8.58    Hemoglobin   13.3    Hematocrit   40.9    Platelets   172    Total Cholesterol   134    Triglycerides   68    HDL Cholesterol   56    LDL Cholesterol    64    Hemoglobin A1C   7.20    Microalbumin, Urine   8.6                Assessment and Plan   Diagnoses and all orders for this visit:    1. Benign essential hypertension (Primary)  -     carvedilol (COREG) 25 MG tablet; Take 1 tablet by mouth 2 (Two) Times a Day.  Dispense: 180 tablet; Refill: 1  -     losartan (COZAAR) 50 MG tablet; Take 1 tablet by mouth 2 (Two) Times a Day.  Dispense: 180 tablet; Refill: 1    2. Mixed hyperlipidemia  -     atorvastatin (LIPITOR) 40 MG tablet; Take 1 tablet by mouth Daily.  Dispense: 90 tablet; Refill: 1  -     CBC (No Diff)  -     Comprehensive Metabolic Panel  -     Lipid  Panel    3. Type 2 diabetes mellitus with hyperglycemia, with long-term current use of insulin  -     empagliflozin (Jardiance) 25 MG tablet tablet; Take 1 tablet by mouth Daily.  Dispense: 30 tablet; Refill: 4  -     Insulin Degludec (Tresiba FlexTouch) 200 UNIT/ML solution pen-injector pen injection; Inject 30 Units under the skin into the appropriate area as directed every night at bedtime.  Dispense: 15 mL; Refill: 3  -     insulin aspart (NovoLOG FlexPen) 100 UNIT/ML solution pen-injector sc pen; Inject 4 Units under the skin into the appropriate area as directed 3 (Three) Times a Day With Meals.  Dispense: 15 mL; Refill: 3  -     Hemoglobin A1c  -     Microalbumin / Creatinine Urine Ratio - Urine, Clean Catch  -     glucose blood test strip; Check sugar 3 times a day  Dispense: 400 each; Refill: 12  -     Lancets 30G misc; Check sugar 3 times a day  Dispense: 400 each; Refill: 12    4. Stage 3a chronic kidney disease  -     Comprehensive Metabolic Panel    5. Neuralgia  -     Compliance Drug Analysis, Ur - Urine, Clean Catch  -     gabapentin (NEURONTIN) 300 MG capsule; Take 1 capsule by mouth every night at bedtime.  Dispense: 30 capsule; Refill: 4    6. Therapeutic drug monitoring  -     Compliance Drug Analysis, Ur - Urine, Clean Catch      Plan:  1.  Benign essential hypertension: Will continue current medication and advised to stop amlodipine, low-sodium diet advised, Counseled to regularly check BP at home with goal averaging <130/80.   2.mixed hyperlipidemia: will obtain   fasting CMP and lipid panel.  Diet and exercise counseled,  Will continue current medications  3. Diabetes  Mellitus  : will obtain   fasting CMP  and hba1c   7.2 , diet and exercise counseled , Will continue current medications  4.  cKd stage IIIa : will obtain labs, monitor BMP  5.  Neuralgia: Will continue current medication, PRATEEK and Shukri reviewed and obtain and refill gabapentin       Follow Up   Return in about 14 weeks (around  10/27/2025).  Patient was given instructions and counseling regarding his condition or for health maintenance advice. Please see specific information pulled into the AVS if appropriate.

## 2025-08-05 ENCOUNTER — TELEPHONE (OUTPATIENT)
Dept: INTERNAL MEDICINE | Facility: CLINIC | Age: 85
End: 2025-08-05
Payer: MEDICARE

## 2025-08-05 DIAGNOSIS — Z79.4 TYPE 2 DIABETES MELLITUS WITH HYPERGLYCEMIA, WITH LONG-TERM CURRENT USE OF INSULIN: ICD-10-CM

## 2025-08-05 DIAGNOSIS — E11.65 TYPE 2 DIABETES MELLITUS WITH HYPERGLYCEMIA, WITH LONG-TERM CURRENT USE OF INSULIN: ICD-10-CM

## (undated) DEVICE — SUCTION CANISTER, 1500CC, RIGID: Brand: DEROYAL

## (undated) DEVICE — LUBE JELLY PK/2.75GM STRL BX/144

## (undated) DEVICE — ENDOSCOPY PORT CONNECTOR FOR OLYMPUS® SCOPES: Brand: ERBE

## (undated) DEVICE — FRCP BIOP COLD ENDOJAW ALLGTR W/NDL 2.8X2300MM BLU

## (undated) DEVICE — TP SXN YANKR BULB STRL

## (undated) DEVICE — ESOPHAGEAL/PYLORIC/COLONIC WIREGUIDED BALLOON DILATATION CATHETER: Brand: CRE WIREGUIDED

## (undated) DEVICE — Device: Brand: DEFENDO AIR/WATER/SUCTION AND BIOPSY VALVE

## (undated) DEVICE — Device

## (undated) DEVICE — CONMED SCOPE SAVER BITE BLOCK, 20X27 MM: Brand: SCOPE SAVER

## (undated) DEVICE — PAD GRND REM POLYHESIVE A/ DISP

## (undated) DEVICE — GLV SURG TRIUMPH MICRO PF LTX 7.5 STRL

## (undated) DEVICE — QUICK CATCH IN-LINE SUCTION POLYP TRAP IS USED FOR SUCTION RETRIEVAL OF ENDOSCOPICALLY REMOVED POLYPS.

## (undated) DEVICE — HYBRID TUBING/CAP SET FOR OLYMPUS® SCOPES: Brand: ERBE

## (undated) DEVICE — SYR LUER SLPTP 50ML

## (undated) DEVICE — DEV INFL ALLIANCE2 SYS

## (undated) DEVICE — MEDI-VAC NON-CONDUCTIVE SUCTION TUBING: Brand: CARDINAL HEALTH

## (undated) DEVICE — VLV SXN AIR/H2O ORCAPOD3 1P/U STRL

## (undated) DEVICE — JELLY,LUBE,STERILE,FLIP TOP,TUBE,2-OZ: Brand: MEDLINE

## (undated) DEVICE — ENDOGATOR AUXILIARY WATER JET CONNECTOR: Brand: ENDOGATOR

## (undated) DEVICE — KT ORCA VLV SXN AIR/H2O W/SEAL 1P/U STRL